# Patient Record
Sex: FEMALE | Race: WHITE | NOT HISPANIC OR LATINO | Employment: FULL TIME | URBAN - METROPOLITAN AREA
[De-identification: names, ages, dates, MRNs, and addresses within clinical notes are randomized per-mention and may not be internally consistent; named-entity substitution may affect disease eponyms.]

---

## 2017-03-24 ENCOUNTER — HOSPITAL ENCOUNTER (OUTPATIENT)
Dept: MAMMOGRAPHY | Facility: HOSPITAL | Age: 47
Discharge: HOME | End: 2017-03-24
Attending: FAMILY MEDICINE | Admitting: FAMILY MEDICINE

## 2017-11-09 ENCOUNTER — HOSPITAL ENCOUNTER (OUTPATIENT)
Dept: OTHER | Facility: HOSPITAL | Age: 47
Discharge: HOME | End: 2017-11-09
Attending: FAMILY MEDICINE | Admitting: FAMILY MEDICINE

## 2019-03-12 ENCOUNTER — HOSPITAL ENCOUNTER (OUTPATIENT)
Dept: OTHER | Facility: HOSPITAL | Age: 49
Discharge: HOME | End: 2019-03-12
Attending: FAMILY MEDICINE | Admitting: FAMILY MEDICINE

## 2019-09-10 ENCOUNTER — HOSPITAL ENCOUNTER (OUTPATIENT)
Dept: GENERAL RADIOLOGY | Facility: HOSPITAL | Age: 49
Discharge: HOME | End: 2019-09-10
Admitting: FAMILY MEDICINE

## 2019-09-10 ENCOUNTER — OFFICE VISIT (OUTPATIENT)
Dept: FAMILY MEDICINE CLINIC | Facility: CLINIC | Age: 49
End: 2019-09-10

## 2019-09-10 VITALS
BODY MASS INDEX: 34.88 KG/M2 | SYSTOLIC BLOOD PRESSURE: 118 MMHG | DIASTOLIC BLOOD PRESSURE: 78 MMHG | HEART RATE: 91 BPM | WEIGHT: 222.2 LBS | OXYGEN SATURATION: 97 % | RESPIRATION RATE: 18 BRPM | TEMPERATURE: 98.2 F | HEIGHT: 67 IN

## 2019-09-10 DIAGNOSIS — G89.29 CHRONIC LEFT HIP PAIN: Primary | ICD-10-CM

## 2019-09-10 DIAGNOSIS — M25.552 CHRONIC LEFT HIP PAIN: Primary | ICD-10-CM

## 2019-09-10 DIAGNOSIS — I10 HYPERTENSION, BENIGN: ICD-10-CM

## 2019-09-10 DIAGNOSIS — M79.7 FIBROMYALGIA: ICD-10-CM

## 2019-09-10 DIAGNOSIS — M25.50 ARTHRALGIA OF MULTIPLE JOINTS: ICD-10-CM

## 2019-09-10 DIAGNOSIS — M16.12 PRIMARY OSTEOARTHRITIS OF LEFT HIP: ICD-10-CM

## 2019-09-10 DIAGNOSIS — E66.3 OVERWEIGHT (BMI 25.0-29.9): ICD-10-CM

## 2019-09-10 PROBLEM — Z12.4 SCREENING FOR CERVICAL CANCER: Status: ACTIVE | Noted: 2019-09-10

## 2019-09-10 PROBLEM — J68.3 REACTIVE AIRWAYS DYSFUNCTION SYNDROME (HCC): Status: ACTIVE | Noted: 2019-09-10

## 2019-09-10 PROBLEM — M17.12 PRIMARY OSTEOARTHRITIS OF LEFT KNEE: Status: ACTIVE | Noted: 2019-09-10

## 2019-09-10 PROBLEM — M51.16 LUMBAR DISC DISEASE WITH RADICULOPATHY: Status: ACTIVE | Noted: 2019-09-10

## 2019-09-10 PROBLEM — F41.9 ANXIETY AND DEPRESSION: Status: ACTIVE | Noted: 2019-09-10

## 2019-09-10 PROBLEM — H25.9 AGE-RELATED CATARACT OF BOTH EYES: Status: ACTIVE | Noted: 2019-09-10

## 2019-09-10 PROBLEM — Z00.01 ENCOUNTER FOR ANNUAL GENERAL MEDICAL EXAMINATION WITH ABNORMAL FINDINGS IN ADULT: Status: ACTIVE | Noted: 2019-09-10

## 2019-09-10 PROBLEM — Z87.891 HISTORY OF TOBACCO USE: Status: ACTIVE | Noted: 2019-09-10

## 2019-09-10 PROBLEM — Z86.711 HISTORY OF PULMONARY EMBOLISM: Status: ACTIVE | Noted: 2019-09-10

## 2019-09-10 PROBLEM — Z12.31 ENCOUNTER FOR SCREENING MAMMOGRAM FOR MALIGNANT NEOPLASM OF BREAST: Status: ACTIVE | Noted: 2019-09-10

## 2019-09-10 PROBLEM — F32.A ANXIETY AND DEPRESSION: Status: ACTIVE | Noted: 2019-09-10

## 2019-09-10 PROBLEM — D68.51 HETEROZYGOUS FACTOR V LEIDEN MUTATION: Status: ACTIVE | Noted: 2019-09-10

## 2019-09-10 PROBLEM — K21.9 GASTROESOPHAGEAL REFLUX DISEASE WITHOUT ESOPHAGITIS: Status: ACTIVE | Noted: 2019-09-10

## 2019-09-10 PROBLEM — Z86.718 HISTORY OF DVT (DEEP VEIN THROMBOSIS): Status: ACTIVE | Noted: 2019-09-10

## 2019-09-10 PROCEDURE — 73502 X-RAY EXAM HIP UNI 2-3 VIEWS: CPT

## 2019-09-10 PROCEDURE — 99214 OFFICE O/P EST MOD 30 MIN: CPT | Performed by: FAMILY MEDICINE

## 2019-09-10 RX ORDER — ATENOLOL 25 MG/1
1 TABLET ORAL DAILY
COMMUNITY
Start: 2019-03-12 | End: 2020-03-09 | Stop reason: SDUPTHER

## 2019-09-10 RX ORDER — RABEPRAZOLE SODIUM 20 MG/1
1 TABLET, DELAYED RELEASE ORAL DAILY PRN
Refills: 4 | COMMUNITY
Start: 2019-08-18 | End: 2021-03-03 | Stop reason: SDUPTHER

## 2019-09-10 RX ORDER — MONTELUKAST SODIUM 10 MG/1
1 TABLET ORAL DAILY
COMMUNITY
Start: 2019-03-06 | End: 2020-03-25

## 2019-09-10 RX ORDER — LOSARTAN POTASSIUM 25 MG/1
1 TABLET ORAL DAILY
COMMUNITY
Start: 2019-03-12 | End: 2019-12-30

## 2019-09-10 RX ORDER — PSEUDOEPHEDRINE HCL 30 MG
1 TABLET ORAL EVERY 4 HOURS
COMMUNITY
Start: 2019-03-12

## 2019-09-10 RX ORDER — ALBUTEROL SULFATE 2.5 MG/3ML
2.5 SOLUTION RESPIRATORY (INHALATION)
COMMUNITY
Start: 2019-03-06 | End: 2019-09-10

## 2019-09-10 RX ORDER — LORATADINE 10 MG/1
1 TABLET ORAL DAILY
COMMUNITY
Start: 2019-03-06

## 2019-09-10 RX ORDER — DULOXETIN HYDROCHLORIDE 60 MG/1
1 CAPSULE, DELAYED RELEASE ORAL DAILY
COMMUNITY
Start: 2018-11-19 | End: 2020-03-25

## 2019-09-10 RX ORDER — BUDESONIDE 0.25 MG/2ML
0.25 INHALANT ORAL 2 TIMES DAILY
COMMUNITY
Start: 2019-03-12 | End: 2019-09-10

## 2019-09-10 RX ORDER — ACETAMINOPHEN 325 MG/1
1-2 TABLET ORAL EVERY 4 HOURS PRN
COMMUNITY
Start: 2017-11-08 | End: 2021-08-31

## 2019-09-10 NOTE — PROGRESS NOTES
Subjective   Giovanni Shin is a 49 y.o. female.     Giovanni was last by Dr. Sommers- hematology on 09/3/2019 for follow up on Factor V. She is suppose to follow up again in 6 months. She was referred to pulmonary and is scheduled on 09/19/2019 with Dr. Freeman for shortness of breath. She is on Lovenox chronically.      Hip Pain    The incident occurred more than 1 week ago. There was no injury mechanism. The pain is present in the left hip and right hip (groin region. Left > right). The quality of the pain is described as aching. The pain is moderate. The pain has been intermittent since onset. Pertinent negatives include no inability to bear weight, numbness or tingling. She reports no foreign bodies present. She has tried NSAIDs for the symptoms. The treatment provided no relief (She has known SI jt degenerative arthritis as well as LDD).   Arm Pain    The incident occurred more than 1 week ago. There was no injury mechanism. The pain is present in the left shoulder, left hand, left wrist, left elbow, right elbow, right hand, right shoulder and right wrist. The quality of the pain is described as aching. The pain is moderate. Pertinent negatives include no chest pain, numbness or tingling. She has tried NSAIDs for the symptoms. The treatment provided no relief.   Hypertension   This is a chronic problem. The current episode started more than 1 year ago. The problem is controlled. Pertinent negatives include no blurred vision, chest pain, headaches, neck pain, palpitations or peripheral edema. Risk factors for coronary artery disease include family history and obesity. Current antihypertension treatment includes beta blockers and angiotensin blockers. The current treatment provides significant improvement.   Back Pain   This is a chronic problem. The current episode started more than 1 year ago. The problem occurs constantly. The problem has been gradually worsening since onset. The pain radiates to the left thigh.  The pain is at a severity of 6/10. The pain is moderate. The pain is the same all the time. Pertinent negatives include no abdominal pain, chest pain, fever, headaches, numbness, paresthesias, pelvic pain, tingling, weakness or weight loss. (She has been seen by neuro-surgery in the past, her last MRI 2016, she has also been seen by ortho, both advised conservative Rx. She has mild to moderate spinal stenosis, and well as LDD at L4-L5. ) Risk factors: She has has hx of rheumatology 2017, with dx of fibromalgia, she reports that her pain is in all muscle groups. Shes does not tolerate lyrica or gabapentin. She has weaned from narcotics.         The following portions of the patient's history were reviewed and updated as appropriate: allergies, current medications, past family history, past medical history, past social history, past surgical history and problem list.    Family History   Problem Relation Age of Onset   • Hyperlipidemia Mother    • Hypertension Mother    • Hypertension Father    • Factor V Leiden deficiency Father    • Factor V Leiden deficiency Brother    • Cancer Maternal Grandfather          malignant neoplasm of gastrointestinal tract    • Down syndrome Maternal Cousin        Social History     Tobacco Use   • Smoking status: Former Smoker     Packs/day: 0.05     Types: Cigarettes     Start date: 1994   • Smokeless tobacco: Never Used   Substance Use Topics   • Alcohol use: Yes     Frequency: Monthly or less     Drinks per session: 1 or 2     Binge frequency: Never   • Drug use: No       History reviewed. No pertinent surgical history.    Patient Active Problem List   Diagnosis   • Age-related cataract of both eyes   • Allergic rhinitis due to pollen   • Anxiety and depression   • Arthralgia of multiple joints   • Encounter for annual general medical examination with abnormal findings in adult   • Encounter for screening mammogram for malignant neoplasm of breast   • Gastroesophageal reflux disease  without esophagitis   • Heterozygous factor V Leiden mutation (CMS/[facility])   • History of DVT (deep vein thrombosis)   • History of pulmonary embolism   • Hypertension, benign   • Lumbar disc disease with radiculopathy   • Overweight (BMI 25.0-29.9)   • Primary osteoarthritis of left knee   • Reactive airways dysfunction syndrome (CMS/[facility])   • Screening for cervical cancer   • History of tobacco use   • Fibromyalgia   • Primary osteoarthritis of left hip       Current Outpatient Medications on File Prior to Visit   Medication Sig Dispense Refill   • acetaminophen (TYLENOL) 325 MG tablet Take 1-2 tablets by mouth Every 4 (Four) Hours As Needed for Pain.     • atenolol (TENORMIN) 25 MG tablet Take 1 tablet by mouth Daily.     • DULoxetine (CYMBALTA) 60 MG capsule Take 1 capsule by mouth Daily.     • enoxaparin (LOVENOX) 150 MG/ML injection Inject 150 mg under the skin into the appropriate area as directed Daily.     • loratadine (CLARITIN) 10 MG tablet Take 1 tablet by mouth Daily.     • losartan (COZAAR) 25 MG tablet Take 1 tablet by mouth Daily.     • montelukast (SINGULAIR) 10 MG tablet Take 1 tablet by mouth Daily.     • pseudoephedrine (SUDAFED) 30 MG tablet Take 1 tablet by mouth Every 4 (Four) Hours.     • RABEprazole (ACIPHEX) 20 MG EC tablet Take 1 tablet by mouth Daily As Needed.  4     No current facility-administered medications on file prior to visit.        No Known Allergies    Review of Systems   Constitutional: Positive for fatigue. Negative for fever and unexpected weight loss.   HENT: Positive for congestion.    Eyes: Negative for blurred vision.   Cardiovascular: Negative for chest pain, palpitations and leg swelling.   Gastrointestinal: Negative for abdominal pain, nausea and vomiting.   Endocrine: Negative for cold intolerance, heat intolerance, polydipsia and polyuria.   Genitourinary: Negative for pelvic pain.   Musculoskeletal: Positive for arthralgias (multiple, multiple muscular complaints. )  "and back pain. Negative for gait problem, joint swelling and neck pain.   Skin: Negative for rash and bruise.   Allergic/Immunologic: Positive for environmental allergies.   Neurological: Negative for tingling, weakness, numbness, headache and paresthesias.   Psychiatric/Behavioral: Negative for depressed mood.       Objective   Visit Vitals  /78 (BP Location: Right arm, Patient Position: Sitting, Cuff Size: Adult)   Pulse 91   Temp 98.2 °F (36.8 °C) (Oral)   Resp 18   Ht 170.2 cm (67\")   Wt 101 kg (222 lb 3.2 oz)   LMP 09/03/2019 (Approximate)   SpO2 97% Comment: Room air   Breastfeeding? No   BMI 34.80 kg/m²     Physical Exam   Constitutional: She is oriented to person, place, and time. She appears well-developed and well-nourished. No distress.   Eyes: Conjunctivae are normal.   Neck: Neck supple. No JVD present. No thyromegaly present.   Cardiovascular: Normal rate, regular rhythm, normal heart sounds and intact distal pulses. Exam reveals no gallop and no friction rub.   No murmur heard.  Pulmonary/Chest: Effort normal and breath sounds normal. No respiratory distress. She has no wheezes. She has no rales.   Abdominal: Soft. Bowel sounds are normal. She exhibits no distension and no mass. There is no tenderness.   Musculoskeletal: She exhibits no edema.        Left shoulder: She exhibits decreased range of motion and tenderness. She exhibits no swelling, no deformity and normal pulse.        Left elbow: Normal.        Left wrist: Normal.        Lumbar back: She exhibits tenderness. She exhibits no bony tenderness, no swelling and no edema.   Lymphadenopathy:     She has no cervical adenopathy.   Neurological: She is alert and oriented to person, place, and time. She displays normal reflexes. Coordination normal.   Skin: Skin is warm. No rash noted. No erythema.   Psychiatric: She has a normal mood and affect.   Vitals reviewed.        Assessment/Plan .  Problem List Items Addressed This Visit        High "    Hypertension, benign    Overview     Controlled         Current Assessment & Plan     Hypertension is unchanged.  Continue current treatment regimen.  Blood pressure will be reassessed in 3 months.            Medium    Fibromyalgia    Overview     Diagnosed by rheumatology. Will begin PT continue prn OTC prn and follow.          Primary osteoarthritis of left hip    Overview     Mild on xray 09/10/2019            Low    Arthralgia of multiple joints    Overview     She has been seen by [facility], She reports the diagnosis of Fibromyalga.   She is to make a followup  Her workup with lab and xray were negative  She is off narotics         Relevant Orders    Ambulatory Referral to Pain Management    Overweight (BMI 25.0-29.9)    Overview     Diet exercise and wt loss encouraged. Prediabetes understood           Other Visit Diagnoses     Chronic left hip pain    -  Primary    mild arthritis on xray, but sxs likely aggravated by radicular  pain form LDD>    Relevant Orders    XR Hip With or Without Pelvis 2 - 3 View Left (Completed)

## 2019-09-17 ENCOUNTER — CLINICAL SUPPORT (OUTPATIENT)
Dept: FAMILY MEDICINE CLINIC | Facility: CLINIC | Age: 49
End: 2019-09-17

## 2019-09-17 ENCOUNTER — TELEPHONE (OUTPATIENT)
Dept: FAMILY MEDICINE CLINIC | Facility: CLINIC | Age: 49
End: 2019-09-17

## 2019-09-17 DIAGNOSIS — Z02.83 ENCOUNTER FOR DRUG SCREENING: Primary | ICD-10-CM

## 2019-09-17 DIAGNOSIS — Z11.1 VISIT FOR TB SKIN TEST: ICD-10-CM

## 2019-09-17 PROCEDURE — 86580 TB INTRADERMAL TEST: CPT | Performed by: FAMILY MEDICINE

## 2019-09-23 ENCOUNTER — TELEPHONE (OUTPATIENT)
Dept: FAMILY MEDICINE CLINIC | Facility: CLINIC | Age: 49
End: 2019-09-23

## 2019-11-08 ENCOUNTER — TRANSCRIBE ORDERS (OUTPATIENT)
Dept: ADMINISTRATIVE | Facility: HOSPITAL | Age: 49
End: 2019-11-08

## 2019-11-08 DIAGNOSIS — R06.00 DYSPNEA, UNSPECIFIED TYPE: Primary | ICD-10-CM

## 2019-11-11 RX ORDER — PANTOPRAZOLE SODIUM 40 MG/1
40 TABLET, DELAYED RELEASE ORAL DAILY
Qty: 30 TABLET | Refills: 12 | Status: SHIPPED | OUTPATIENT
Start: 2019-11-11 | End: 2019-12-27

## 2019-11-20 ENCOUNTER — RESULTS ENCOUNTER (OUTPATIENT)
Dept: PAIN MEDICINE | Facility: CLINIC | Age: 49
End: 2019-11-20

## 2019-11-20 ENCOUNTER — OFFICE VISIT (OUTPATIENT)
Dept: PAIN MEDICINE | Facility: CLINIC | Age: 49
End: 2019-11-20

## 2019-11-20 VITALS
HEART RATE: 73 BPM | TEMPERATURE: 99.3 F | WEIGHT: 217 LBS | RESPIRATION RATE: 16 BRPM | DIASTOLIC BLOOD PRESSURE: 86 MMHG | BODY MASS INDEX: 34.87 KG/M2 | OXYGEN SATURATION: 95 % | SYSTOLIC BLOOD PRESSURE: 128 MMHG | HEIGHT: 66 IN

## 2019-11-20 DIAGNOSIS — M54.42 CHRONIC BILATERAL LOW BACK PAIN WITH BILATERAL SCIATICA: ICD-10-CM

## 2019-11-20 DIAGNOSIS — Z79.899 LONG TERM USE OF DRUG: ICD-10-CM

## 2019-11-20 DIAGNOSIS — G89.29 CHRONIC BILATERAL LOW BACK PAIN WITH BILATERAL SCIATICA: ICD-10-CM

## 2019-11-20 DIAGNOSIS — M25.50 ARTHRALGIA OF MULTIPLE JOINTS: Primary | ICD-10-CM

## 2019-11-20 DIAGNOSIS — M51.16 LUMBAR DISC DISEASE WITH RADICULOPATHY: ICD-10-CM

## 2019-11-20 DIAGNOSIS — M17.12 PRIMARY OSTEOARTHRITIS OF LEFT KNEE: ICD-10-CM

## 2019-11-20 DIAGNOSIS — M16.12 PRIMARY OSTEOARTHRITIS OF LEFT HIP: ICD-10-CM

## 2019-11-20 DIAGNOSIS — M79.7 FIBROMYALGIA: ICD-10-CM

## 2019-11-20 DIAGNOSIS — M54.41 CHRONIC BILATERAL LOW BACK PAIN WITH BILATERAL SCIATICA: ICD-10-CM

## 2019-11-20 PROCEDURE — 99204 OFFICE O/P NEW MOD 45 MIN: CPT | Performed by: PHYSICAL MEDICINE & REHABILITATION

## 2019-11-20 PROCEDURE — G0463 HOSPITAL OUTPT CLINIC VISIT: HCPCS | Performed by: PHYSICAL MEDICINE & REHABILITATION

## 2019-11-20 RX ORDER — CELECOXIB 200 MG/1
200 CAPSULE ORAL DAILY PRN
Qty: 30 CAPSULE | Refills: 2 | Status: SHIPPED | OUTPATIENT
Start: 2019-11-20 | End: 2020-01-17 | Stop reason: SDUPTHER

## 2019-11-20 NOTE — PROGRESS NOTES
Subjective   Giovanni Shin is a 49 y.o. female.     Chronic LBP radiating to b/l hips, pain in b/l hips and multiple joints, 8/10 at worst, 5/10 at best, always present, varies, began around 2010, worsening, aching, pressure, worse with all activity and inactivity both, interfers with ADLs, sleep, activity, failed chiropractor, PT, LESIs, RFA, was taking Norco 7.5mg BID prn in past. X-ray L hip with mild OA. 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 No FH of substance abuse.         The following portions of the patient's history were reviewed and updated as appropriate: allergies, current medications, past family history, past medical history, past social history, past surgical history and problem list.    Review of Systems   Constitutional: Positive for fatigue. Negative for chills and fever.   HENT: Negative for hearing loss and trouble swallowing.    Eyes: Positive for visual disturbance.   Respiratory: Positive for shortness of breath.    Cardiovascular: Positive for chest pain.   Gastrointestinal: Negative for abdominal pain, constipation, diarrhea, nausea and vomiting.   Genitourinary: Negative for urinary incontinence.   Musculoskeletal: Positive for arthralgias, back pain and joint swelling. Negative for myalgias and neck pain.   Neurological: Positive for headache. Negative for dizziness, weakness and numbness.       Objective   Physical Exam   Constitutional: She is oriented to person, place, and time. She appears well-developed and well-nourished.   HENT:   Head: Normocephalic and atraumatic.   Eyes: EOM are normal. Pupils are equal, round, and reactive to light.   Neck: Normal range of motion.   Cardiovascular: Normal rate, regular rhythm, normal heart sounds and intact distal pulses.    Pulmonary/Chest: Breath sounds normal.   Abdominal: Soft. Bowel sounds are normal. She exhibits no distension. There is no tenderness.   Neurological: She is alert and oriented to person, place, and time. She has normal strength and normal reflexes. She displays normal reflexes. No sensory deficit.   Psychiatric: She has a normal mood and affect. Her behavior is normal. Thought content normal.         Assessment/Plan   Giovanni was seen today for back pain.    Diagnoses and all orders for this visit:    Arthralgia of multiple joints    Lumbar disc disease with radiculopathy    Fibromyalgia    Primary osteoarthritis of left knee    Primary osteoarthritis of left hip    Chronic bilateral low back pain with bilateral sciatica        Discussed risks and benefits of opioid treatment for chonic pain with patient, including expectations related to prescription requests, alternative modalities to opioids for managing pain, her treatment plan, risks of dependency and addiction, and safe storage practices for prescribed opioids, as well as proper and improper disposal of all medications.  Will obtain UDS today, pain contract today.  Inspect report reviewed, prior notes reviewed, consistent with patient's stated history.  Treatment plan will consist of continuing current medication as long as it remains effective and is necessary, while evaluating patient at each visit and determining if the medication can be lowered or discontinued, while also using nonopioid therapies to reduce reliance on opioids.  Begin Belbuca 150mcg/hr BID, may need to increase before next visit.  Cont Cymbalta. Cannot tolerate Gabapentin, Lyrica.  Begin Celebrex 200mg qdaily prn instead of Ibuprofen, less risk of GI bleed on Lovenox.  Failed LESIs, RFA.  RTC 2-3 months for f/u.      INSPECT REPORT     As part of the patient's treatment plan, I am prescribing controlled substances. The patient has been made aware of appropriate use of such medications,  including potential risk of somnolence, limited ability to drive and/or work safely, and the potential for dependence or overdose. It has also bee made clear that these medications are for use by this patient only, without concomitant use of alcohol or other substances unless prescribed.      Patient has completed prescribing agreement detailing terms of continued prescribing of controlled substances, including monitoring INSPECT reports, urine drug screening, and pill counts if necessary. The patient is aware that inappropriate use will results in cessation of prescribing such medications.     INSPECT report has been reviewed and scanned into the patient's chart.     As the clinician, I personally reviewed the INSPECT while the patient was in the office today.     History and physical exam exhibit continued safe and appropriate use of controlled substances.

## 2019-11-26 ENCOUNTER — TELEPHONE (OUTPATIENT)
Dept: PAIN MEDICINE | Facility: HOSPITAL | Age: 49
End: 2019-11-26

## 2019-11-26 NOTE — TELEPHONE ENCOUNTER
Patient was a new patient last week and was given belbuca. Insurance will not approve. The following criteria must be met,   You have already been taking an extended-release opioid drug for 30 days  - You have severe continuous pain and tried immediate-release opioids for one week. Please advise on if you would like to change med or try giving patient immediate release for one week.

## 2019-11-27 RX ORDER — HYDROCODONE BITARTRATE AND ACETAMINOPHEN 5; 325 MG/1; MG/1
1 TABLET ORAL EVERY 6 HOURS PRN
Qty: 28 TABLET | Refills: 0 | Status: SHIPPED | OUTPATIENT
Start: 2019-11-27 | End: 2020-01-17

## 2019-12-10 ENCOUNTER — TELEPHONE (OUTPATIENT)
Dept: PAIN MEDICINE | Facility: CLINIC | Age: 49
End: 2019-12-10

## 2019-12-10 NOTE — TELEPHONE ENCOUNTER
Patient called and states belbuca is making her drowsy. Patient states belbuca makes the pain go away but she cannot take due to it making her drowsy and having to drive to a lot. I informed patient to stop belbuca and you would be back Monday and we could discuss other treatment options.

## 2019-12-12 ENCOUNTER — HOSPITAL ENCOUNTER (OUTPATIENT)
Dept: RESPIRATORY THERAPY | Facility: HOSPITAL | Age: 49
Discharge: HOME | End: 2019-12-12
Admitting: INTERNAL MEDICINE

## 2019-12-12 DIAGNOSIS — R06.00 DYSPNEA, UNSPECIFIED TYPE: ICD-10-CM

## 2019-12-12 PROCEDURE — 94726 PLETHYSMOGRAPHY LUNG VOLUMES: CPT

## 2019-12-12 PROCEDURE — 94729 DIFFUSING CAPACITY: CPT

## 2019-12-12 PROCEDURE — 94060 EVALUATION OF WHEEZING: CPT

## 2019-12-12 RX ORDER — ALBUTEROL SULFATE 2.5 MG/3ML
2.5 SOLUTION RESPIRATORY (INHALATION) ONCE
Status: COMPLETED | OUTPATIENT
Start: 2019-12-12 | End: 2019-12-12

## 2019-12-12 RX ADMIN — ALBUTEROL SULFATE 2.5 MG: 2.5 SOLUTION RESPIRATORY (INHALATION) at 12:05

## 2019-12-20 ENCOUNTER — OFFICE VISIT (OUTPATIENT)
Dept: PAIN MEDICINE | Facility: CLINIC | Age: 49
End: 2019-12-20

## 2019-12-20 ENCOUNTER — TELEPHONE (OUTPATIENT)
Dept: PAIN MEDICINE | Facility: HOSPITAL | Age: 49
End: 2019-12-20

## 2019-12-20 VITALS
RESPIRATION RATE: 16 BRPM | HEART RATE: 67 BPM | OXYGEN SATURATION: 94 % | DIASTOLIC BLOOD PRESSURE: 73 MMHG | BODY MASS INDEX: 34.23 KG/M2 | HEIGHT: 66 IN | TEMPERATURE: 98.5 F | SYSTOLIC BLOOD PRESSURE: 114 MMHG | WEIGHT: 213 LBS

## 2019-12-20 DIAGNOSIS — G89.29 CHRONIC BILATERAL LOW BACK PAIN WITH BILATERAL SCIATICA: ICD-10-CM

## 2019-12-20 DIAGNOSIS — M17.12 PRIMARY OSTEOARTHRITIS OF LEFT KNEE: ICD-10-CM

## 2019-12-20 DIAGNOSIS — M25.50 ARTHRALGIA OF MULTIPLE JOINTS: Primary | ICD-10-CM

## 2019-12-20 DIAGNOSIS — M54.42 CHRONIC BILATERAL LOW BACK PAIN WITH BILATERAL SCIATICA: ICD-10-CM

## 2019-12-20 DIAGNOSIS — M51.16 LUMBAR DISC DISEASE WITH RADICULOPATHY: ICD-10-CM

## 2019-12-20 DIAGNOSIS — M54.41 CHRONIC BILATERAL LOW BACK PAIN WITH BILATERAL SCIATICA: ICD-10-CM

## 2019-12-20 DIAGNOSIS — M79.7 FIBROMYALGIA: ICD-10-CM

## 2019-12-20 DIAGNOSIS — M16.12 PRIMARY OSTEOARTHRITIS OF LEFT HIP: ICD-10-CM

## 2019-12-20 PROCEDURE — G0463 HOSPITAL OUTPT CLINIC VISIT: HCPCS | Performed by: PHYSICAL MEDICINE & REHABILITATION

## 2019-12-20 PROCEDURE — 99213 OFFICE O/P EST LOW 20 MIN: CPT | Performed by: PHYSICAL MEDICINE & REHABILITATION

## 2019-12-20 NOTE — PROGRESS NOTES
Subjective   Giovanni Shin is a 49 y.o. female.     Chronic LBP radiating to b/l hips, pain in b/l hips and multiple joints, 8/10 at worst, 5/10 at best, always present, varies, began around 2010, worsening, aching, pressure, worse with all activity and inactivity both, interfers with ADLs, sleep, activity, failed chiropractor, PT, LESIs, RFA, was taking Norco 7.5mg BID prn in past. X-ray L hip with mild OA. 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 No FH of substance abuse.       The following portions of the patient's history were reviewed and updated as appropriate: allergies, current medications, past family history, past medical history, past social history, past surgical history and problem list.    Review of Systems   Constitutional: Positive for fatigue. Negative for chills and fever.   HENT: Negative for hearing loss and trouble swallowing.    Eyes: Positive for visual disturbance.   Respiratory: Positive for shortness of breath.    Cardiovascular: Positive for chest pain.   Gastrointestinal: Negative for abdominal pain, constipation, diarrhea, nausea and vomiting.   Genitourinary: Negative for urinary incontinence.   Musculoskeletal: Positive for arthralgias, back pain and joint swelling. Negative for myalgias and neck pain.   Neurological: Positive for headache. Negative for dizziness, weakness and numbness.       Objective   Physical Exam   Constitutional: She is oriented to person, place, and time. She appears well-developed and well-nourished.   HENT:   Head: Normocephalic and atraumatic.   Eyes: Pupils are equal, round, and reactive to light. EOM are normal.   Neck: Normal range of motion.   Cardiovascular: Normal rate, regular rhythm, normal heart sounds and intact distal pulses.    Pulmonary/Chest: Breath sounds normal.   Abdominal: Soft. Bowel sounds are normal. She exhibits no distension. There is no tenderness.   Neurological: She is alert and oriented to person, place, and time. She has normal strength and normal reflexes. She displays normal reflexes. No sensory deficit.   Psychiatric: She has a normal mood and affect. Her behavior is normal. Thought content normal.         Assessment/Plan   Giovanni was seen today for back pain.    Diagnoses and all orders for this visit:    Arthralgia of multiple joints    Chronic bilateral low back pain with bilateral sciatica    Fibromyalgia    Lumbar disc disease with radiculopathy    Primary osteoarthritis of left hip    Primary osteoarthritis of left knee        UDS in order 11/20/19.  Treatment plan will consist of continuing current medication as long as it remains effective and is necessary, while evaluating patient at each visit and determining if the medication can be lowered or discontinued, while also using nonopioid therapies to reduce reliance on opioids.  Began Belbuca 150mcg/hr BID, helped but made her drowsy. Begin 75mcg/hr BID.  Did OK on Norco 5mg but needs time release meds.  Cont Cymbalta. Cannot tolerate Gabapentin, Lyrica.  Began Celebrex 200mg qdaily prn instead of Ibuprofen, less risk of GI bleed on Lovenox.  Failed LESIs, RFA.  RTC 3 months for f/u.      INSPECT REPORT     As part of the patient's treatment plan, I am prescribing controlled substances. The patient has been made aware of appropriate use of such medications, including potential risk of somnolence, limited ability to drive and/or work safely, and the potential for dependence or overdose. It has also bee made clear that these medications are for use by this patient only, without concomitant use of alcohol or other substances unless prescribed.      Patient has completed prescribing agreement detailing terms of continued prescribing of controlled substances,  including monitoring INSPECT reports, urine drug screening, and pill counts if necessary. The patient is aware that inappropriate use will results in cessation of prescribing such medications.     INSPECT report has been reviewed and scanned into the patient's chart.     As the clinician, I personally reviewed the INSPECT while the patient was in the office today.     History and physical exam exhibit continued safe and appropriate use of controlled substances.

## 2019-12-20 NOTE — TELEPHONE ENCOUNTER
Patient returned 7 [facility] films counted and destroyed by Yoko Fernandez MA witnessed Wanda Garcia RN.

## 2019-12-27 ENCOUNTER — OFFICE VISIT (OUTPATIENT)
Dept: FAMILY MEDICINE CLINIC | Facility: CLINIC | Age: 49
End: 2019-12-27

## 2019-12-27 VITALS
BODY MASS INDEX: 35.55 KG/M2 | TEMPERATURE: 97.6 F | HEART RATE: 71 BPM | HEIGHT: 66 IN | DIASTOLIC BLOOD PRESSURE: 76 MMHG | SYSTOLIC BLOOD PRESSURE: 111 MMHG | OXYGEN SATURATION: 97 % | RESPIRATION RATE: 16 BRPM | WEIGHT: 221.2 LBS

## 2019-12-27 DIAGNOSIS — L72.0 INCLUSION CYST: Primary | ICD-10-CM

## 2019-12-27 DIAGNOSIS — E66.3 OVERWEIGHT (BMI 25.0-29.9): ICD-10-CM

## 2019-12-27 DIAGNOSIS — Z87.891 HISTORY OF TOBACCO USE: ICD-10-CM

## 2019-12-27 DIAGNOSIS — I10 HYPERTENSION, BENIGN: ICD-10-CM

## 2019-12-27 PROCEDURE — 11310 SHAVE SKIN LESION 0.5 CM/<: CPT | Performed by: FAMILY MEDICINE

## 2019-12-27 NOTE — PROGRESS NOTES
Subjective   Giovanni Shin is a 49 y.o. female.     Chief Complaint   Patient presents with   • Lesion       Lesion:   Giovanni Shin is here today for a lesion. The lesion appeared gradual and has been occurring for years. The course has been increasing in size. The lesion is characterized as no sign of infection. The lesions are located over face. The symptoms have been associated with moderate to severe itching. The lesions are flesh colored.        Hypertension   This is a chronic problem. The current episode started more than 1 year ago. The problem is controlled. Pertinent negatives include no blurred vision, chest pain, headaches, palpitations or shortness of breath. Current antihypertension treatment includes beta blockers and angiotensin blockers. The current treatment provides significant improvement.        The following portions of the patient's history were reviewed and updated as appropriate: allergies, current medications, past family history, past medical history, past social history, past surgical history and problem list.    Allergies:  No Known Allergies    Social History:  Social History     Socioeconomic History   • Marital status:      Spouse name: Not on file   • Number of children: Not on file   • Years of education: Not on file   • Highest education level: Not on file   Tobacco Use   • Smoking status: Former Smoker     Packs/day: 0.05     Types: Cigarettes     Start date: 1994   • Smokeless tobacco: Never Used   Substance and Sexual Activity   • Alcohol use: Yes     Frequency: Monthly or less     Drinks per session: 1 or 2     Binge frequency: Never   • Drug use: No   • Sexual activity: Defer       Family History:  Family History   Problem Relation Age of Onset   • Hyperlipidemia Mother    • Hypertension Mother    • Hypertension Father    • Factor V Leiden deficiency Father    • Factor V Leiden deficiency Brother    • Cancer Maternal Grandfather          malignant neoplasm of  gastrointestinal tract    • Down syndrome Maternal Cousin        Past Medical History :  Patient Active Problem List   Diagnosis   • Age-related cataract of both eyes   • Allergic rhinitis due to pollen   • Anxiety and depression   • Arthralgia of multiple joints   • Encounter for annual general medical examination with abnormal findings in adult   • Encounter for screening mammogram for malignant neoplasm of breast   • Gastroesophageal reflux disease without esophagitis   • Heterozygous factor V Leiden mutation (CMS/HCC)   • History of DVT (deep vein thrombosis)   • History of pulmonary embolism   • Hypertension, benign   • Lumbar disc disease with radiculopathy   • Overweight (BMI 25.0-29.9)   • Primary osteoarthritis of left knee   • Reactive airways dysfunction syndrome (CMS/HCC)   • Screening for cervical cancer   • History of tobacco use   • Fibromyalgia   • Primary osteoarthritis of left hip   • Chronic bilateral low back pain with bilateral sciatica       Medication List:    Current Outpatient Medications:   •  acetaminophen (TYLENOL) 325 MG tablet, Take 1-2 tablets by mouth Every 4 (Four) Hours As Needed for Pain., Disp: , Rfl:   •  atenolol (TENORMIN) 25 MG tablet, Take 1 tablet by mouth Daily., Disp: , Rfl:   •  Buprenorphine HCl (BELBUCA) 75 MCG film, Apply 1 film to cheek Every 12 (Twelve) Hours., Disp: 60 each, Rfl: 0  •  celecoxib (CeleBREX) 200 MG capsule, Take 1 capsule by mouth Daily As Needed for Mild Pain ., Disp: 30 capsule, Rfl: 2  •  DULoxetine (CYMBALTA) 60 MG capsule, Take 1 capsule by mouth Daily., Disp: , Rfl:   •  enoxaparin (LOVENOX) 150 MG/ML injection, Inject 150 mg under the skin into the appropriate area as directed Daily., Disp: , Rfl:   •  HYDROcodone-acetaminophen (NORCO) 5-325 MG per tablet, Take 1 tablet by mouth Every 6 (Six) Hours As Needed for Moderate Pain ., Disp: 28 tablet, Rfl: 0  •  loratadine (CLARITIN) 10 MG tablet, Take 1 tablet by mouth Daily., Disp: , Rfl:   •   "montelukast (SINGULAIR) 10 MG tablet, Take 1 tablet by mouth Daily., Disp: , Rfl:   •  pseudoephedrine (SUDAFED) 30 MG tablet, Take 1 tablet by mouth Every 4 (Four) Hours., Disp: , Rfl:   •  RABEprazole (ACIPHEX) 20 MG EC tablet, Take 1 tablet by mouth Daily As Needed., Disp: , Rfl: 4  •  VITAMIN D PO, Take 50 Units by mouth Daily., Disp: , Rfl:   •  losartan (COZAAR) 25 MG tablet, Take 1 tablet by mouth Daily., Disp: 90 tablet, Rfl: 3    Past Surgical History:  History reviewed. No pertinent surgical history.    Review of Systems:  Review of Systems   Eyes: Negative for blurred vision.   Respiratory: Negative for shortness of breath.    Cardiovascular: Negative for chest pain and palpitations.       Physical Exam:  Vital Signs:  Visit Vitals  /76 (BP Location: Right arm, Patient Position: Sitting, Cuff Size: Adult)   Pulse 71   Temp 97.6 °F (36.4 °C)   Resp 16   Ht 167.6 cm (66\")   Wt 100 kg (221 lb 3.2 oz)   LMP 12/15/2019   SpO2 97% Comment: room air   BMI 35.70 kg/m²       Physical Exam   Constitutional: She appears well-developed and well-nourished. No distress.   Cardiovascular: Normal rate, regular rhythm and normal heart sounds. Exam reveals no gallop and no friction rub.   No murmur heard.  Pulmonary/Chest: Effort normal and breath sounds normal.   Skin:   There are 3 flesh colored lesions with center craters 2 over the right cheek and 1 over the left chin. The lesions appear consistent with inclusion cysts.      Biopsy  Date/Time: 12/27/2019 9:21 AM  Performed by: Kath Eisenberg MD  Authorized by: Kath Eisenberg MD     Procedure Details - Skin Biopsy:     Initial size (mm): 3    Malignancy: benign lesion      Destruction method: shave biopsy      Comments:   Lesion #1, 5 mm right superior cheek, cleansed with betadine, anesthestized   with 2 % lidocaine and shave, bleeding controlled with cautery.   Lesion # 2, 5 mm right inferior cheek, cleansed with betadine, anesthestized   with 2 % " lidocaine and shave, bleeding controlled with cautery  Lesion # 3, 5 mm left chin cleansed with betadine, anesthestized   with 2 % lidocaine and shave, bleeding controlled with cautery    Bleeding was minimal she left the off ice in stable condition and tolerated the procedure well.       Assessment and Plan:  Problem List Items Addressed This Visit        High    Hypertension, benign    Overview     Controlled            Low    Overweight (BMI 25.0-29.9)    Overview     Diet exercise and wt loss encouraged. Prediabetes understood         History of tobacco use      Other Visit Diagnoses     Inclusion cyst    -  Primary    Relevant Orders    Biopsy    Reference Histopathology          An After Visit Summary and PPPS were given to the patient.

## 2019-12-30 RX ORDER — LOSARTAN POTASSIUM 25 MG/1
25 TABLET ORAL DAILY
Qty: 90 TABLET | Refills: 3 | Status: SHIPPED | OUTPATIENT
Start: 2019-12-30 | End: 2020-03-09 | Stop reason: SDUPTHER

## 2020-01-07 LAB
DX ICD CODE: NORMAL
PATH REPORT.FINAL DX SPEC: NORMAL
PATH REPORT.GROSS SPEC: NORMAL
PATH REPORT.SITE OF ORIGIN SPEC: NORMAL
PATHOLOGIST NAME: NORMAL
PAYMENT PROCEDURE: NORMAL

## 2020-01-17 ENCOUNTER — TELEPHONE (OUTPATIENT)
Dept: PAIN MEDICINE | Facility: CLINIC | Age: 50
End: 2020-01-17

## 2020-01-17 ENCOUNTER — OFFICE VISIT (OUTPATIENT)
Dept: PAIN MEDICINE | Facility: CLINIC | Age: 50
End: 2020-01-17

## 2020-01-17 VITALS
SYSTOLIC BLOOD PRESSURE: 130 MMHG | TEMPERATURE: 98.6 F | HEART RATE: 81 BPM | DIASTOLIC BLOOD PRESSURE: 85 MMHG | RESPIRATION RATE: 16 BRPM | OXYGEN SATURATION: 98 %

## 2020-01-17 DIAGNOSIS — M54.41 CHRONIC BILATERAL LOW BACK PAIN WITH BILATERAL SCIATICA: ICD-10-CM

## 2020-01-17 DIAGNOSIS — G89.29 CHRONIC BILATERAL LOW BACK PAIN WITH BILATERAL SCIATICA: ICD-10-CM

## 2020-01-17 DIAGNOSIS — M16.12 PRIMARY OSTEOARTHRITIS OF LEFT HIP: ICD-10-CM

## 2020-01-17 DIAGNOSIS — M54.42 CHRONIC BILATERAL LOW BACK PAIN WITH BILATERAL SCIATICA: ICD-10-CM

## 2020-01-17 DIAGNOSIS — M51.16 LUMBAR DISC DISEASE WITH RADICULOPATHY: ICD-10-CM

## 2020-01-17 DIAGNOSIS — M79.7 FIBROMYALGIA: ICD-10-CM

## 2020-01-17 DIAGNOSIS — M17.12 PRIMARY OSTEOARTHRITIS OF LEFT KNEE: ICD-10-CM

## 2020-01-17 DIAGNOSIS — M25.50 ARTHRALGIA OF MULTIPLE JOINTS: Primary | ICD-10-CM

## 2020-01-17 PROCEDURE — 99214 OFFICE O/P EST MOD 30 MIN: CPT | Performed by: PHYSICAL MEDICINE & REHABILITATION

## 2020-01-17 PROCEDURE — G0463 HOSPITAL OUTPT CLINIC VISIT: HCPCS | Performed by: PHYSICAL MEDICINE & REHABILITATION

## 2020-01-17 RX ORDER — HYDROCODONE BITARTRATE 20 MG/1
1 TABLET, EXTENDED RELEASE ORAL DAILY
Qty: 30 EACH | Refills: 0 | Status: SHIPPED | OUTPATIENT
Start: 2020-01-17 | End: 2020-03-16

## 2020-01-17 RX ORDER — HYDROCODONE BITARTRATE 20 MG/1
1 TABLET, EXTENDED RELEASE ORAL DAILY
Qty: 30 EACH | Refills: 0 | Status: SHIPPED | OUTPATIENT
Start: 2020-01-17 | End: 2020-01-17 | Stop reason: SDUPTHER

## 2020-01-17 RX ORDER — CELECOXIB 200 MG/1
200 CAPSULE ORAL DAILY PRN
Qty: 30 CAPSULE | Refills: 11 | Status: SHIPPED | OUTPATIENT
Start: 2020-01-17 | End: 2020-05-20 | Stop reason: SDUPTHER

## 2020-01-17 NOTE — PROGRESS NOTES
Subjective   Giovanni Shin is a 49 y.o. female.     Chronic LBP radiating to b/l hips, pain in b/l hips and multiple joints, 8/10 at worst, 5/10 at best, always present, varies, began around 2010, worsening, aching, pressure, worse with all activity and inactivity both, interfers with ADLs, sleep, activity, failed chiropractor, PT, LESIs, RFA, was taking Norco 7.5mg BID prn in past. X-ray L hip with mild OA. 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 No FH of substance abuse.       The following portions of the patient's history were reviewed and updated as appropriate: allergies, current medications, past family history, past medical history, past social history, past surgical history and problem list.    Review of Systems   Constitutional: Positive for fatigue. Negative for chills and fever.   HENT: Negative for hearing loss and trouble swallowing.    Eyes: Positive for visual disturbance.   Respiratory: Positive for shortness of breath.    Cardiovascular: Positive for chest pain.   Gastrointestinal: Negative for abdominal pain, constipation, diarrhea, nausea and vomiting.   Genitourinary: Negative for urinary incontinence.   Musculoskeletal: Positive for arthralgias, back pain and joint swelling. Negative for myalgias and neck pain.   Neurological: Positive for headache. Negative for dizziness, weakness and numbness.       Objective   Physical Exam   Constitutional: She is oriented to person, place, and time. She appears well-developed and well-nourished.   HENT:   Head: Normocephalic and atraumatic.   Eyes: Pupils are equal, round, and reactive to light. EOM are normal.   Neck: Normal range of motion.   Cardiovascular: Normal rate, regular rhythm, normal heart sounds and intact distal pulses.    Pulmonary/Chest: Breath sounds normal.   Abdominal: Soft. Bowel sounds are normal. She exhibits no distension. There is no tenderness.   Neurological: She is alert and oriented to person, place, and time. She has normal strength and normal reflexes. She displays normal reflexes. No sensory deficit.   Psychiatric: She has a normal mood and affect. Her behavior is normal. Thought content normal.         Assessment/Plan   Giovanni was seen today for back pain and joint pain.    Diagnoses and all orders for this visit:    Arthralgia of multiple joints    Chronic bilateral low back pain with bilateral sciatica    Fibromyalgia    Lumbar disc disease with radiculopathy    Primary osteoarthritis of left hip    Primary osteoarthritis of left knee        UDS in order 11/20/19.  Treatment plan will consist of continuing current medication as long as it remains effective and is necessary, while evaluating patient at each visit and determining if the medication can be lowered or discontinued, while also using nonopioid therapies to reduce reliance on opioids.  Began Belbuca 150mcg/hr BID, helped but made her drowsy, also with 75mcg/hr BID.  Did OK on Norco 5mg but needs time release meds. Begin Hysingla 20mg qdaily.  Cont Cymbalta. Cannot tolerate Gabapentin, Lyrica.  Began Celebrex 200mg qdaily prn instead of Ibuprofen, less risk of GI bleed on Lovenox.  Failed LESJuan, REAL.  RTC 3 months for f/u.      INSPECT REPORT     As part of the patient's treatment plan, I am prescribing controlled substances. The patient has been made aware of appropriate use of such medications, including potential risk of somnolence, limited ability to drive and/or work safely, and the potential for dependence or overdose. It has also bee made clear that these medications are for use by this patient only, without concomitant use of alcohol or other substances unless prescribed.      Patient has completed prescribing agreement detailing terms of  continued prescribing of controlled substances, including monitoring INSPECT reports, urine drug screening, and pill counts if necessary. The patient is aware that inappropriate use will results in cessation of prescribing such medications.     INSPECT report has been reviewed and scanned into the patient's chart.     As the clinician, I personally reviewed the INSPECT while the patient was in the office today.     History and physical exam exhibit continued safe and appropriate use of controlled substances.

## 2020-01-17 NOTE — TELEPHONE ENCOUNTER
Pt brought in #[facility] patches to waste. Patches destroyed and disposed of in the sharps container witnessed by robin villalpando ma

## 2020-02-24 ENCOUNTER — TELEPHONE (OUTPATIENT)
Dept: PAIN MEDICINE | Facility: HOSPITAL | Age: 50
End: 2020-02-24

## 2020-03-09 ENCOUNTER — TELEPHONE (OUTPATIENT)
Dept: FAMILY MEDICINE CLINIC | Facility: CLINIC | Age: 50
End: 2020-03-09

## 2020-03-09 DIAGNOSIS — I10 HYPERTENSION, BENIGN: Primary | ICD-10-CM

## 2020-03-09 RX ORDER — ATENOLOL 25 MG/1
25 TABLET ORAL DAILY
Qty: 90 TABLET | Refills: 3 | Status: SHIPPED | OUTPATIENT
Start: 2020-03-09 | End: 2021-02-08 | Stop reason: SDUPTHER

## 2020-03-09 RX ORDER — LOSARTAN POTASSIUM 25 MG/1
25 TABLET ORAL DAILY
Qty: 90 TABLET | Refills: 3 | Status: SHIPPED | OUTPATIENT
Start: 2020-03-09 | End: 2021-02-08 | Stop reason: SDUPTHER

## 2020-03-16 ENCOUNTER — OFFICE VISIT (OUTPATIENT)
Dept: PAIN MEDICINE | Facility: CLINIC | Age: 50
End: 2020-03-16

## 2020-03-16 ENCOUNTER — TELEPHONE (OUTPATIENT)
Dept: PAIN MEDICINE | Facility: CLINIC | Age: 50
End: 2020-03-16

## 2020-03-16 VITALS
BODY MASS INDEX: 31.66 KG/M2 | HEART RATE: 64 BPM | OXYGEN SATURATION: 95 % | TEMPERATURE: 98.1 F | HEIGHT: 66 IN | DIASTOLIC BLOOD PRESSURE: 59 MMHG | SYSTOLIC BLOOD PRESSURE: 118 MMHG | WEIGHT: 197 LBS | RESPIRATION RATE: 16 BRPM

## 2020-03-16 DIAGNOSIS — M79.7 FIBROMYALGIA: ICD-10-CM

## 2020-03-16 DIAGNOSIS — M54.42 CHRONIC BILATERAL LOW BACK PAIN WITH BILATERAL SCIATICA: Primary | ICD-10-CM

## 2020-03-16 DIAGNOSIS — G89.29 CHRONIC BILATERAL LOW BACK PAIN WITH BILATERAL SCIATICA: Primary | ICD-10-CM

## 2020-03-16 DIAGNOSIS — M16.12 PRIMARY OSTEOARTHRITIS OF LEFT HIP: ICD-10-CM

## 2020-03-16 DIAGNOSIS — M17.12 PRIMARY OSTEOARTHRITIS OF LEFT KNEE: ICD-10-CM

## 2020-03-16 DIAGNOSIS — M51.16 LUMBAR DISC DISEASE WITH RADICULOPATHY: ICD-10-CM

## 2020-03-16 DIAGNOSIS — M54.41 CHRONIC BILATERAL LOW BACK PAIN WITH BILATERAL SCIATICA: Primary | ICD-10-CM

## 2020-03-16 PROCEDURE — 99214 OFFICE O/P EST MOD 30 MIN: CPT | Performed by: PHYSICAL MEDICINE & REHABILITATION

## 2020-03-16 PROCEDURE — G0463 HOSPITAL OUTPT CLINIC VISIT: HCPCS | Performed by: PHYSICAL MEDICINE & REHABILITATION

## 2020-03-16 RX ORDER — RIVAROXABAN 20 MG/1
20 TABLET, FILM COATED ORAL DAILY
COMMUNITY
Start: 2020-03-03 | End: 2021-12-15 | Stop reason: ALTCHOICE

## 2020-03-16 RX ORDER — HYDROCODONE BITARTRATE AND ACETAMINOPHEN 5; 325 MG/1; MG/1
1 TABLET ORAL EVERY 6 HOURS PRN
Qty: 120 TABLET | Refills: 0 | Status: SHIPPED | OUTPATIENT
Start: 2020-03-16 | End: 2020-06-10 | Stop reason: SDUPTHER

## 2020-03-16 NOTE — PROGRESS NOTES
Subjective   Giovanni Shin is a 50 y.o. female.     Chronic LBP radiating to b/l hips, pain in b/l hips and multiple joints, 8/10 at worst, 5/10 at best, always present, varies, began around 2010, worsening, aching, pressure, worse with all activity and inactivity both, interfers with ADLs, sleep, activity, failed chiropractor, PT, LESIs, RFA, was taking Norco 7.5mg BID prn in past. X-ray L hip with mild OA. 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 No FH of substance abuse.       The following portions of the patient's history were reviewed and updated as appropriate: allergies, current medications, past family history, past medical history, past social history, past surgical history and problem list.    Review of Systems   Constitutional: Positive for fatigue. Negative for chills and fever.   HENT: Negative for hearing loss and trouble swallowing.    Eyes: Positive for visual disturbance.   Respiratory: Positive for shortness of breath.    Cardiovascular: Positive for chest pain.   Gastrointestinal: Negative for abdominal pain, constipation, diarrhea, nausea and vomiting.   Genitourinary: Negative for urinary incontinence.   Musculoskeletal: Positive for arthralgias, back pain and joint swelling. Negative for myalgias and neck pain.   Neurological: Positive for headache. Negative for dizziness, weakness and numbness.       Objective   Physical Exam   Constitutional: She is oriented to person, place, and time. She appears well-developed and well-nourished.   HENT:   Head: Normocephalic and atraumatic.   Eyes: Pupils are equal, round, and reactive to light. EOM are normal.   Neck: Normal range of motion.   Cardiovascular: Normal rate, regular rhythm, normal heart sounds and intact distal pulses.    Pulmonary/Chest: Breath sounds normal.   Abdominal: Soft. Bowel sounds are normal. She exhibits no distension. There is no tenderness.   Neurological: She is alert and oriented to person, place, and time. She has normal strength and normal reflexes. She displays normal reflexes. No sensory deficit.   Psychiatric: She has a normal mood and affect. Her behavior is normal. Thought content normal.         Assessment/Plan   Giovanni was seen today for hip pain, knee pain and back pain.    Diagnoses and all orders for this visit:    Chronic bilateral low back pain with bilateral sciatica    Fibromyalgia    Lumbar disc disease with radiculopathy    Primary osteoarthritis of left hip    Primary osteoarthritis of left knee        UDS in order 11/20/19.  Treatment plan will consist of continuing current medication as long as it remains effective and is necessary, while evaluating patient at each visit and determining if the medication can be lowered or discontinued, while also using nonopioid therapies to reduce reliance on opioids.  Began Belbuca 150mcg/hr BID, helped but made her drowsy, also with 75mcg/hr BID.  Did OK on Norco 5mg but needs time release meds with making her too hyper. Began Hysingla 20mg qdaily, still too drowsy.  Restart Norco prn. Begin Pennsaid BID prn.  Cont Cymbalta. Cannot tolerate Gabapentin, Lyrica.  Began Celebrex 200mg qdaily prn instead of Ibuprofen, less risk of GI bleed on Lovenox.  Failed LESIs, RFA.  RTC 3 months for f/u.      INSPECT REPORT     As part of the patient's treatment plan, I am prescribing controlled substances. The patient has been made aware of appropriate use of such medications, including potential risk of somnolence, limited ability to drive and/or work safely, and the potential for dependence or overdose. It has also bee made clear that these medications are for use by this patient only, without concomitant use of alcohol or other substances unless prescribed.      Patient  has completed prescribing agreement detailing terms of continued prescribing of controlled substances, including monitoring INSPECT reports, urine drug screening, and pill counts if necessary. The patient is aware that inappropriate use will results in cessation of prescribing such medications.     INSPECT report has been reviewed and scanned into the patient's chart.     As the clinician, I personally reviewed the INSPECT while the patient was in the office today.     History and physical exam exhibit continued safe and appropriate use of controlled substances.

## 2020-03-16 NOTE — TELEPHONE ENCOUNTER
[facility] RETURNED WITH [facility] FOR 2/16/20. DESTROYED BY JIMENA RIVERA MA WITNESSED BY ALVINA MOSQUERA RN.

## 2020-03-25 RX ORDER — DULOXETIN HYDROCHLORIDE 60 MG/1
CAPSULE, DELAYED RELEASE ORAL
Qty: 90 CAPSULE | Refills: 3 | Status: SHIPPED | OUTPATIENT
Start: 2020-03-25 | End: 2021-02-08 | Stop reason: SDUPTHER

## 2020-03-25 RX ORDER — MONTELUKAST SODIUM 10 MG/1
TABLET ORAL
Qty: 90 TABLET | Refills: 3 | Status: SHIPPED | OUTPATIENT
Start: 2020-03-25 | End: 2021-07-13 | Stop reason: SDUPTHER

## 2020-05-19 ENCOUNTER — HOSPITAL ENCOUNTER (OUTPATIENT)
Dept: ULTRASOUND IMAGING | Facility: HOSPITAL | Age: 50
Discharge: HOME | End: 2020-05-19
Admitting: FAMILY MEDICINE

## 2020-05-19 ENCOUNTER — OFFICE VISIT (OUTPATIENT)
Dept: FAMILY MEDICINE CLINIC | Facility: CLINIC | Age: 50
End: 2020-05-19

## 2020-05-19 VITALS
RESPIRATION RATE: 16 BRPM | OXYGEN SATURATION: 96 % | TEMPERATURE: 98.6 F | WEIGHT: 204.4 LBS | SYSTOLIC BLOOD PRESSURE: 124 MMHG | DIASTOLIC BLOOD PRESSURE: 80 MMHG | HEIGHT: 66 IN | BODY MASS INDEX: 32.85 KG/M2 | HEART RATE: 81 BPM

## 2020-05-19 DIAGNOSIS — N92.4 ABNORMAL PERIMENOPAUSAL BLEEDING: Primary | ICD-10-CM

## 2020-05-19 DIAGNOSIS — Z13.220 SCREENING FOR HYPERLIPIDEMIA: ICD-10-CM

## 2020-05-19 DIAGNOSIS — E66.3 OVERWEIGHT (BMI 25.0-29.9): ICD-10-CM

## 2020-05-19 DIAGNOSIS — Z87.891 HISTORY OF TOBACCO USE: ICD-10-CM

## 2020-05-19 DIAGNOSIS — Z12.4 CERVICAL CANCER SCREENING: ICD-10-CM

## 2020-05-19 DIAGNOSIS — N92.4 ABNORMAL PERIMENOPAUSAL BLEEDING: ICD-10-CM

## 2020-05-19 DIAGNOSIS — I10 HYPERTENSION, BENIGN: ICD-10-CM

## 2020-05-19 DIAGNOSIS — D68.51 HETEROZYGOUS FACTOR V LEIDEN MUTATION (HCC): ICD-10-CM

## 2020-05-19 LAB
B-HCG UR QL: NEGATIVE
BILIRUB BLD-MCNC: NEGATIVE MG/DL
CLARITY, POC: ABNORMAL
COLOR UR: ABNORMAL
GLUCOSE UR STRIP-MCNC: NEGATIVE MG/DL
INTERNAL NEGATIVE CONTROL: NEGATIVE
INTERNAL POSITIVE CONTROL: POSITIVE
KETONES UR QL: NEGATIVE
LEUKOCYTE EST, POC: NEGATIVE
Lab: NORMAL
NITRITE UR-MCNC: NEGATIVE MG/ML
PH UR: 6 [PH] (ref 5–8)
PROT UR STRIP-MCNC: ABNORMAL MG/DL
RBC # UR STRIP: ABNORMAL /UL
SP GR UR: 1.02 (ref 1–1.03)
UROBILINOGEN UR QL: NORMAL

## 2020-05-19 PROCEDURE — 76830 TRANSVAGINAL US NON-OB: CPT

## 2020-05-19 PROCEDURE — 81002 URINALYSIS NONAUTO W/O SCOPE: CPT | Performed by: FAMILY MEDICINE

## 2020-05-19 PROCEDURE — 81025 URINE PREGNANCY TEST: CPT | Performed by: FAMILY MEDICINE

## 2020-05-19 PROCEDURE — 58100 BIOPSY OF UTERUS LINING: CPT | Performed by: FAMILY MEDICINE

## 2020-05-19 PROCEDURE — 99214 OFFICE O/P EST MOD 30 MIN: CPT | Performed by: FAMILY MEDICINE

## 2020-05-19 PROCEDURE — 76856 US EXAM PELVIC COMPLETE: CPT

## 2020-05-19 NOTE — PROGRESS NOTES
Subjective   Giovanni Shin is a 50 y.o. female.     Chief Complaint   Patient presents with   • Menstrual Problem       Menstrual Problem:  Giovanni is G5, P4. The menstrual problem is characterized as intermenstrual bleeding and heavy menses and has been occurring for 6 weeks. Giovanni states that she has not had a period at 11/2019. Last menstrual period: Date: (04/04/2020, 04/26/2020, 05/16/2020). Currently pregnant: no The symptoms have been associated with abdominal pain, anxiety, abdominal cramps, hot flashes and low back pain, while the symptoms have not been associated with breast engorgement, chills, dizziness, fever, nausea or vomiting The patient has been using anticoagulants, while she denies the use of oral contraceptives       The following portions of the patient's history were reviewed and updated as appropriate: allergies, current medications, past family history, past medical history, past social history, past surgical history and problem list.    Allergies:  No Known Allergies    Social History:  Social History     Socioeconomic History   • Marital status:      Spouse name: Not on file   • Number of children: Not on file   • Years of education: Not on file   • Highest education level: Not on file   Tobacco Use   • Smoking status: Former Smoker     Packs/day: 0.05     Types: Cigarettes     Start date: 1994   • Smokeless tobacco: Never Used   Substance and Sexual Activity   • Alcohol use: Yes     Frequency: Monthly or less     Drinks per session: 1 or 2     Binge frequency: Never   • Drug use: No   • Sexual activity: Defer       Family History:  Family History   Problem Relation Age of Onset   • Hyperlipidemia Mother    • Hypertension Mother    • Hypertension Father    • Factor V Leiden deficiency Father    • Factor V Leiden deficiency Brother    • Cancer Maternal Grandfather          malignant neoplasm of gastrointestinal tract    • Down syndrome Maternal Cousin        Past Medical History  :  Patient Active Problem List   Diagnosis   • Age-related cataract of both eyes   • Allergic rhinitis due to pollen   • Anxiety and depression   • Arthralgia of multiple joints   • Encounter for annual general medical examination with abnormal findings in adult   • Encounter for screening mammogram for malignant neoplasm of breast   • Gastroesophageal reflux disease without esophagitis   • Heterozygous factor V Leiden mutation (CMS/HCC)   • History of DVT (deep vein thrombosis)   • History of pulmonary embolism   • Hypertension, benign   • Lumbar disc disease with radiculopathy   • Overweight (BMI 25.0-29.9)   • Primary osteoarthritis of left knee   • Reactive airways dysfunction syndrome (CMS/HCC)   • Screening for cervical cancer   • History of tobacco use   • Fibromyalgia   • Primary osteoarthritis of left hip   • Chronic bilateral low back pain with bilateral sciatica   • Abnormal perimenopausal bleeding       Medication List:  Outpatient Encounter Medications as of 5/19/2020   Medication Sig Dispense Refill   • acetaminophen (TYLENOL) 325 MG tablet Take 1-2 tablets by mouth Every 4 (Four) Hours As Needed for Pain.     • atenolol (TENORMIN) 25 MG tablet Take 1 tablet by mouth Daily. 90 tablet 3   • Diclofenac Sodium (PENNSAID) 2 % solution Place 2 Pump on the skin as directed by provider 2 (Two) Times a Day As Needed (pain). 112 g 11   • DULoxetine (CYMBALTA) 60 MG capsule TAKE 1 CAPSULE DAILY 90 capsule 3   • HYDROcodone-acetaminophen (NORCO) 5-325 MG per tablet Take 1 tablet by mouth Every 6 (Six) Hours As Needed for Moderate Pain . 120 tablet 0   • loratadine (CLARITIN) 10 MG tablet Take 1 tablet by mouth Daily.     • losartan (COZAAR) 25 MG tablet Take 1 tablet by mouth Daily. 90 tablet 3   • montelukast (SINGULAIR) 10 MG tablet TAKE 1 TABLET DAILY WITH ANANTIHISTAMINE FOR ALLERGIES 90 tablet 3   • pseudoephedrine (SUDAFED) 30 MG tablet Take 1 tablet by mouth Every 4 (Four) Hours.     • RABEprazole  "(ACIPHEX) 20 MG EC tablet Take 1 tablet by mouth Daily As Needed.  4   • XARELTO 20 MG tablet Take 20 mg by mouth Daily.     • [DISCONTINUED] celecoxib (CeleBREX) 200 MG capsule Take 1 capsule by mouth Daily As Needed for Mild Pain . 30 capsule 11   • [DISCONTINUED] VITAMIN D PO Take 50 Units by mouth Daily.       No facility-administered encounter medications on file as of 5/19/2020.        Past Surgical History:  History reviewed. No pertinent surgical history.    Review of Systems:  Review of Systems   Constitutional: Negative for appetite change, fatigue and fever.   HENT: Negative for congestion, ear pain, sinus pressure, sore throat and tinnitus.    Eyes: Negative for visual disturbance.   Respiratory: Negative for cough, shortness of breath and wheezing.    Cardiovascular: Negative for chest pain, palpitations and leg swelling.   Gastrointestinal: Negative for abdominal pain, constipation, diarrhea, nausea and vomiting.   Endocrine: Negative.  Negative for cold intolerance, heat intolerance, polydipsia and polyuria.   Genitourinary: Positive for menstrual problem and pelvic pressure. Negative for dysuria, frequency, hematuria, pelvic pain and vaginal discharge.   Musculoskeletal: Negative for arthralgias and myalgias.   Skin: Negative for rash.   Allergic/Immunologic: Negative for environmental allergies.   Neurological: Negative for dizziness, syncope, weakness and headache.   Hematological: Negative for adenopathy. Does not bruise/bleed easily.   Psychiatric/Behavioral: Negative for suicidal ideas and depressed mood. The patient is nervous/anxious.        I have reviewed and confirmed the accuracy of the ROS as documented by the MA/LPN/RN Kath Eisenberg MD    Vital Signs:  Visit Vitals  /80 (BP Location: Right arm, Patient Position: Sitting, Cuff Size: Adult)   Pulse 81   Temp 98.6 °F (37 °C) (Oral)   Resp 16   Ht 167.6 cm (66\")   Wt 92.7 kg (204 lb 6.4 oz)   LMP 05/16/2020   SpO2 96% Comment: Room " air   BMI 32.99 kg/m²       Physical Exam   Constitutional: She is oriented to person, place, and time. She appears well-developed and well-nourished. No distress.   Eyes: Conjunctivae are normal.   Neck: Neck supple. No JVD present. No thyromegaly present.   Cardiovascular: Normal rate, regular rhythm, normal heart sounds and intact distal pulses. Exam reveals no gallop and no friction rub.   No murmur heard.  Pulmonary/Chest: Effort normal and breath sounds normal. No respiratory distress. She has no wheezes. She has no rales.   Abdominal: Soft. Bowel sounds are normal. She exhibits no distension and no mass. There is no tenderness. Hernia confirmed negative in the right inguinal area and confirmed negative in the left inguinal area.   Genitourinary: Vagina normal and uterus normal. Pelvic exam was performed with patient supine. There is no rash on the right labia. There is no rash on the left labia.   Cervix is parous ( There is a mild amt of dark vaginal blood. ). Right adnexum displays no mass and no tenderness. Left adnexum displays no mass and no tenderness.   Musculoskeletal: She exhibits no edema.   Lymphadenopathy:     She has no cervical adenopathy.        Right: No inguinal adenopathy present.        Left: No inguinal adenopathy present.   Neurological: She is alert and oriented to person, place, and time. Coordination normal.   Skin: Skin is warm. No rash noted. No erythema.   Psychiatric: She has a normal mood and affect.   Procedures   Endometrial bx  The cx was prepped, and uterus sounded with a pippele  8 cm. The trochar was removed and moderate amt of endomentrial tissure was obtained.     Assessment and Plan:  Problem List Items Addressed This Visit        High    Heterozygous factor V Leiden mutation (CMS/HCC)    Overview     Father and brother also positive.  She is followed by hemaatology, she has hx of PE, she remain on Lovenox which maybe contributing to her heavy menses.           Hypertension, benign    Overview     Controlled         Current Assessment & Plan     Hypertension is improving with treatment.  Continue current treatment regimen.  Blood pressure will be reassessed at the next regular appointment.         Relevant Orders    Comprehensive Metabolic Panel (Completed)    TSH (Completed)       Low    Overweight (BMI 25.0-29.9)    Overview     Diet exercise and wt loss encouraged. Prediabetes understood. She has successfully lost 10 lbs. She is encouraged to continue life style modifications.          History of tobacco use       Unprioritized    Abnormal perimenopausal bleeding - Primary    Overview     3 episodes of bleeding         Relevant Orders    POCT urinalysis dipstick, manual (Completed)    POCT pregnancy, urine (Completed)    US Pelvis Transvaginal Non OB (Completed)    CBC & Differential (Completed)    Follicle Stimulating Hormone (Completed)    Luteinizing Hormone (Completed)    Reference Histopathology      Other Visit Diagnoses     Screening for hyperlipidemia        Relevant Orders    Lipid Panel With / Chol / HDL Ratio (Completed)    Cervical cancer screening        Relevant Orders    IGP, Aptima HPV, Rfx 16 / 18,45 (Completed)          An After Visit Summary and PPPS were given to the patient.     Site care done- cleaned site with alcohol,  tolerated well, dressing applied. Venipuncture was obtained after 1 time(s). 2 tubes were drawn. Needle ayesha used was 22.  Answers for HPI/ROS submitted by the patient on 5/17/2020   What is the primary reason for your visit?: Other  Please describe your symptoms.: Abnormal monthly cycles and high emotions surrounding this.  Have you had these symptoms before?: Yes  How long have you been having these symptoms?: Greater than 2 weeks  Please list any medications you are currently taking for this condition.: Acetaminophen, Norco  Please describe any probable cause for these symptoms. : thats I want to see doc.

## 2020-05-20 LAB
BASOPHILS # BLD AUTO: 0.1 X10E3/UL (ref 0–0.2)
BASOPHILS NFR BLD AUTO: 1 %
CHOLEST SERPL-MCNC: 231 MG/DL (ref 100–199)
CHOLEST/HDLC SERPL: 3.2 RATIO (ref 0–4.4)
EOSINOPHIL # BLD AUTO: 0.1 X10E3/UL (ref 0–0.4)
EOSINOPHIL NFR BLD AUTO: 1 %
ERYTHROCYTE [DISTWIDTH] IN BLOOD BY AUTOMATED COUNT: 12.6 % (ref 11.7–15.4)
FSH SERPL-ACNC: 27.1 MIU/ML
HCT VFR BLD AUTO: 44.4 % (ref 34–46.6)
HDLC SERPL-MCNC: 73 MG/DL
HGB BLD-MCNC: 15 G/DL (ref 11.1–15.9)
IMM GRANULOCYTES # BLD AUTO: 0 X10E3/UL (ref 0–0.1)
IMM GRANULOCYTES NFR BLD AUTO: 0 %
LDLC SERPL CALC-MCNC: 132 MG/DL (ref 0–99)
LH SERPL-ACNC: 7.3 MIU/ML
LYMPHOCYTES # BLD AUTO: 1.9 X10E3/UL (ref 0.7–3.1)
LYMPHOCYTES NFR BLD AUTO: 28 %
MCH RBC QN AUTO: 32.7 PG (ref 26.6–33)
MCHC RBC AUTO-ENTMCNC: 33.8 G/DL (ref 31.5–35.7)
MCV RBC AUTO: 97 FL (ref 79–97)
MONOCYTES # BLD AUTO: 0.5 X10E3/UL (ref 0.1–0.9)
MONOCYTES NFR BLD AUTO: 7 %
NEUTROPHILS # BLD AUTO: 4.4 X10E3/UL (ref 1.4–7)
NEUTROPHILS NFR BLD AUTO: 63 %
PLATELET # BLD AUTO: 240 X10E3/UL (ref 150–450)
RBC # BLD AUTO: 4.59 X10E6/UL (ref 3.77–5.28)
TRIGL SERPL-MCNC: 132 MG/DL (ref 0–149)
VLDLC SERPL CALC-MCNC: 26 MG/DL (ref 5–40)
WBC # BLD AUTO: 6.9 X10E3/UL (ref 3.4–10.8)

## 2020-05-20 RX ORDER — CELECOXIB 200 MG/1
200 CAPSULE ORAL DAILY PRN
Qty: 30 CAPSULE | Refills: 0 | Status: SHIPPED | OUTPATIENT
Start: 2020-05-20 | End: 2020-06-19

## 2020-05-21 LAB
ALBUMIN SERPL-MCNC: 4.9 G/DL (ref 3.8–4.9)
ALBUMIN SERPL-MCNC: NORMAL G/DL
ALBUMIN/GLOB SERPL: 2.1 {RATIO} (ref 1.2–2.2)
ALP SERPL-CCNC: 79 IU/L (ref 39–117)
ALP SERPL-CCNC: NORMAL U/L
ALT SERPL-CCNC: 18 IU/L (ref 0–32)
ALT SERPL-CCNC: NORMAL U/L
AST SERPL-CCNC: 21 IU/L (ref 0–40)
AST SERPL-CCNC: NORMAL U/L
BILIRUB SERPL-MCNC: 0.6 MG/DL (ref 0–1.2)
BILIRUB SERPL-MCNC: NORMAL MG/DL
BUN SERPL-MCNC: 11 MG/DL (ref 6–24)
BUN SERPL-MCNC: NORMAL MG/DL
BUN/CREAT SERPL: 12 (ref 9–23)
CALCIUM SERPL-MCNC: 9.7 MG/DL (ref 8.7–10.2)
CALCIUM SERPL-MCNC: NORMAL MG/DL
CHLORIDE SERPL-SCNC: 102 MMOL/L (ref 96–106)
CHLORIDE SERPL-SCNC: NORMAL MMOL/L
CO2 SERPL-SCNC: 19 MMOL/L (ref 20–29)
CO2 SERPL-SCNC: NORMAL MMOL/L
CREAT SERPL-MCNC: 0.91 MG/DL (ref 0.57–1)
CREAT SERPL-MCNC: NORMAL MG/DL
CYTOLOGIST CVX/VAG CYTO: NORMAL
CYTOLOGY CVX/VAG DOC CYTO: NORMAL
CYTOLOGY CVX/VAG DOC THIN PREP: NORMAL
DX ICD CODE: NORMAL
GLOBULIN SER CALC-MCNC: 2.3 G/DL (ref 1.5–4.5)
GLUCOSE SERPL-MCNC: 102 MG/DL (ref 65–99)
GLUCOSE SERPL-MCNC: NORMAL MG/DL
HIV 1 & 2 AB SER-IMP: NORMAL
HPV I/H RISK 4 DNA CVX QL PROBE+SIG AMP: NEGATIVE
OTHER STN SPEC: NORMAL
PATHOLOGIST CVX/VAG CYTO: NORMAL
POTASSIUM SERPL-SCNC: 4.6 MMOL/L (ref 3.5–5.2)
POTASSIUM SERPL-SCNC: NORMAL MMOL/L
PROT SERPL-MCNC: 7.2 G/DL (ref 6–8.5)
PROT SERPL-MCNC: NORMAL G/DL
SODIUM SERPL-SCNC: 140 MMOL/L (ref 134–144)
SODIUM SERPL-SCNC: NORMAL MMOL/L
SPECIMEN STATUS: NORMAL
STAT OF ADQ CVX/VAG CYTO-IMP: NORMAL
TSH SERPL DL<=0.005 MIU/L-ACNC: 2.01 UIU/ML (ref 0.45–4.5)
TSH SERPL DL<=0.005 MIU/L-ACNC: NORMAL UIU/ML
WRITTEN AUTHORIZATION: NORMAL

## 2020-06-05 ENCOUNTER — TELEPHONE (OUTPATIENT)
Dept: FAMILY MEDICINE CLINIC | Facility: CLINIC | Age: 50
End: 2020-06-05

## 2020-06-05 NOTE — TELEPHONE ENCOUNTER
Patient called and said that she will need her records of her Tb test. She said that she will be in on Monday Morning to pick them up

## 2020-06-10 ENCOUNTER — OFFICE VISIT (OUTPATIENT)
Dept: PAIN MEDICINE | Facility: CLINIC | Age: 50
End: 2020-06-10

## 2020-06-10 VITALS
OXYGEN SATURATION: 97 % | SYSTOLIC BLOOD PRESSURE: 124 MMHG | HEIGHT: 66 IN | BODY MASS INDEX: 31.18 KG/M2 | WEIGHT: 194 LBS | RESPIRATION RATE: 16 BRPM | TEMPERATURE: 97.3 F | HEART RATE: 85 BPM | DIASTOLIC BLOOD PRESSURE: 85 MMHG

## 2020-06-10 DIAGNOSIS — M16.12 PRIMARY OSTEOARTHRITIS OF LEFT HIP: ICD-10-CM

## 2020-06-10 DIAGNOSIS — M54.42 CHRONIC BILATERAL LOW BACK PAIN WITH BILATERAL SCIATICA: ICD-10-CM

## 2020-06-10 DIAGNOSIS — M17.12 PRIMARY OSTEOARTHRITIS OF LEFT KNEE: ICD-10-CM

## 2020-06-10 DIAGNOSIS — M54.41 CHRONIC BILATERAL LOW BACK PAIN WITH BILATERAL SCIATICA: ICD-10-CM

## 2020-06-10 DIAGNOSIS — G89.29 CHRONIC BILATERAL LOW BACK PAIN WITH BILATERAL SCIATICA: ICD-10-CM

## 2020-06-10 DIAGNOSIS — M51.16 LUMBAR DISC DISEASE WITH RADICULOPATHY: ICD-10-CM

## 2020-06-10 DIAGNOSIS — M25.50 ARTHRALGIA OF MULTIPLE JOINTS: Primary | ICD-10-CM

## 2020-06-10 DIAGNOSIS — M79.7 FIBROMYALGIA: ICD-10-CM

## 2020-06-10 PROCEDURE — G0463 HOSPITAL OUTPT CLINIC VISIT: HCPCS | Performed by: PHYSICAL MEDICINE & REHABILITATION

## 2020-06-10 PROCEDURE — 99213 OFFICE O/P EST LOW 20 MIN: CPT | Performed by: PHYSICAL MEDICINE & REHABILITATION

## 2020-06-10 RX ORDER — HYDROCODONE BITARTRATE AND ACETAMINOPHEN 5; 325 MG/1; MG/1
1 TABLET ORAL EVERY 6 HOURS PRN
Qty: 120 TABLET | Refills: 0 | Status: SHIPPED | OUTPATIENT
Start: 2020-06-10 | End: 2020-09-09 | Stop reason: SDUPTHER

## 2020-06-10 NOTE — PROGRESS NOTES
Subjective   Giovanni Shin is a 50 y.o. female.     Chronic LBP radiating to b/l hips, pain in b/l hips and multiple joints, 8/10 at worst, 5/10 at best, always present, varies, began around 2010, worsening, aching, pressure, worse with all activity and inactivity both, interfers with ADLs, sleep, activity, failed chiropractor, PT, LESIs, RFA, was taking Norco 7.5mg BID prn in past. X-ray L hip with mild OA. 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 No FH of substance abuse.       The following portions of the patient's history were reviewed and updated as appropriate: allergies, current medications, past family history, past medical history, past social history, past surgical history and problem list.    Review of Systems   Constitutional: Positive for fatigue. Negative for chills and fever.   HENT: Negative for hearing loss and trouble swallowing.    Eyes: Positive for visual disturbance.   Respiratory: Positive for shortness of breath.    Cardiovascular: Positive for chest pain.   Gastrointestinal: Negative for abdominal pain, constipation, diarrhea, nausea and vomiting.   Genitourinary: Negative for urinary incontinence.   Musculoskeletal: Positive for arthralgias, back pain and joint swelling. Negative for myalgias and neck pain.   Neurological: Positive for headache. Negative for dizziness, weakness and numbness.       Objective   Physical Exam   Constitutional: She is oriented to person, place, and time. She appears well-developed and well-nourished.   HENT:   Head: Normocephalic and atraumatic.   Eyes: Pupils are equal, round, and reactive to light. EOM are normal.   Neck: Normal range of motion.   Cardiovascular: Normal rate, regular rhythm, normal heart sounds and intact distal pulses.    Pulmonary/Chest: Breath sounds normal.   Abdominal: Soft. Bowel sounds are normal. She exhibits no distension. There is no tenderness.   Neurological: She is alert and oriented to person, place, and time. She has normal strength and normal reflexes. She displays normal reflexes. No sensory deficit.   Psychiatric: She has a normal mood and affect. Her behavior is normal. Thought content normal.         Assessment/Plan   Giovanni was seen today for back pain.    Diagnoses and all orders for this visit:    Arthralgia of multiple joints    Chronic bilateral low back pain with bilateral sciatica    Fibromyalgia    Lumbar disc disease with radiculopathy    Primary osteoarthritis of left hip    Primary osteoarthritis of left knee        UDS in order 11/20/19.  Treatment plan will consist of continuing current medication as long as it remains effective and is necessary, while evaluating patient at each visit and determining if the medication can be lowered or discontinued, while also using nonopioid therapies to reduce reliance on opioids.  Began Belbuca 150mcg/hr BID, helped but made her drowsy, also with 75mcg/hr BID.  Did OK on Norco 5mg but needs time release meds with making her too hyper. Began Hysingla 20mg qdaily, still too drowsy.  Restarted Norco prn, needs only one refill today. Began Pennsaid BID prn, works very well.  Cont Cymbalta. Cannot tolerate Gabapentin, Lyrica.  Began Celebrex 200mg qdaily prn instead of Ibuprofen, less risk of GI bleed on Lovenox.  Failed LESIs, RFA.  RTC 3 months for f/u.      INSPECT REPORT     As part of the patient's treatment plan, I am prescribing controlled substances. The patient has been made aware of appropriate use of such medications, including potential risk of somnolence, limited ability to drive and/or work safely, and the potential for dependence or overdose. It has also bee made clear that these medications are for use by this patient only, without concomitant use of  alcohol or other substances unless prescribed.      Patient has completed prescribing agreement detailing terms of continued prescribing of controlled substances, including monitoring INSPECT reports, urine drug screening, and pill counts if necessary. The patient is aware that inappropriate use will results in cessation of prescribing such medications.     INSPECT report has been reviewed and scanned into the patient's chart.     As the clinician, I personally reviewed the INSPECT while the patient was in the office today.     History and physical exam exhibit continued safe and appropriate use of controlled substances.

## 2020-07-01 RX ORDER — RABEPRAZOLE SODIUM 20 MG/1
TABLET, DELAYED RELEASE ORAL DAILY PRN
Qty: 90 TABLET | OUTPATIENT
Start: 2020-07-01

## 2020-08-04 ENCOUNTER — APPOINTMENT (OUTPATIENT)
Dept: GENERAL RADIOLOGY | Facility: HOSPITAL | Age: 50
End: 2020-08-04

## 2020-08-04 ENCOUNTER — OFFICE VISIT (OUTPATIENT)
Dept: FAMILY MEDICINE CLINIC | Facility: CLINIC | Age: 50
End: 2020-08-04

## 2020-08-04 ENCOUNTER — HOSPITAL ENCOUNTER (OUTPATIENT)
Dept: GENERAL RADIOLOGY | Facility: HOSPITAL | Age: 50
Discharge: HOME | End: 2020-08-04
Admitting: FAMILY MEDICINE

## 2020-08-04 ENCOUNTER — HOSPITAL ENCOUNTER (OUTPATIENT)
Dept: GENERAL RADIOLOGY | Facility: HOSPITAL | Age: 50
Discharge: HOME | End: 2020-08-04

## 2020-08-04 VITALS
OXYGEN SATURATION: 98 % | SYSTOLIC BLOOD PRESSURE: 138 MMHG | RESPIRATION RATE: 16 BRPM | HEART RATE: 78 BPM | TEMPERATURE: 97.9 F | BODY MASS INDEX: 34.01 KG/M2 | HEIGHT: 66 IN | WEIGHT: 211.6 LBS | DIASTOLIC BLOOD PRESSURE: 78 MMHG

## 2020-08-04 DIAGNOSIS — R07.89 OTHER CHEST PAIN: ICD-10-CM

## 2020-08-04 DIAGNOSIS — M19.012 PRIMARY OSTEOARTHRITIS OF LEFT SHOULDER: ICD-10-CM

## 2020-08-04 DIAGNOSIS — M25.512 ACUTE PAIN OF LEFT SHOULDER: ICD-10-CM

## 2020-08-04 DIAGNOSIS — M89.8X1 CLAVICLE PAIN: Primary | ICD-10-CM

## 2020-08-04 DIAGNOSIS — Z86.711 HISTORY OF PULMONARY EMBOLISM: ICD-10-CM

## 2020-08-04 PROCEDURE — 99215 OFFICE O/P EST HI 40 MIN: CPT | Performed by: FAMILY MEDICINE

## 2020-08-04 PROCEDURE — 73030 X-RAY EXAM OF SHOULDER: CPT

## 2020-08-04 PROCEDURE — 71046 X-RAY EXAM CHEST 2 VIEWS: CPT

## 2020-08-04 PROCEDURE — 93000 ELECTROCARDIOGRAM COMPLETE: CPT | Performed by: FAMILY MEDICINE

## 2020-08-04 PROCEDURE — 73000 X-RAY EXAM OF COLLAR BONE: CPT

## 2020-08-04 NOTE — PROGRESS NOTES
Subjective   Giovanni Shin is a 50 y.o. female.     Chief Complaint   Patient presents with   • Clavicle pain   • Hypertension       Giovanni was last seen by Dr. Herrera- pain management for a follow up on lumbar disc disease on 06/10/2020. She was also seen by Dr. Rusty Taveras- dentist on 07/28/2020 for a 6 month cleaning.    Clavicle pain:  Giovanni is here today with complaints of left clavicle pain. She denies any injury to this area. Onset was 1 week ago on 07/31/2020. She describes this as an ache that is rated a 2-3 on pain scale.    Hypertension   This is a chronic problem. The current episode started more than 1 year ago. The problem is controlled. Associated symptoms include chest pain (Some times associated with the left shoulder pain that raadiates into the posterior shoulder. The pain is dull and aching. Appears with movement and relieved by rest.). Pertinent negatives include no headaches, orthopnea, palpitations, peripheral edema, PND, shortness of breath or sweats. Risk factors for coronary artery disease include obesity. Current antihypertension treatment includes angiotensin blockers and beta blockers. The current treatment provides significant improvement.   Chest Pain    This is a new problem. The current episode started 1 to 4 weeks ago. The onset quality is gradual. The problem occurs intermittently. The problem has been unchanged. The pain is present in the substernal region (left shoulder). The pain is at a severity of 5/10. The pain is mild. The quality of the pain is described as dull and pressure. The pain radiates to the left shoulder. Pertinent negatives include no abdominal pain, fever, headaches, nausea, near-syncope, numbness, orthopnea, palpitations, PND, shortness of breath, syncope, vomiting or weakness. The pain is aggravated by movement. She has tried nothing for the symptoms. The treatment provided moderate relief.        The following portions of the patient's history were reviewed  and updated as appropriate: allergies, current medications, past family history, past medical history, past social history, past surgical history and problem list.    Allergies:  Allergies   Allergen Reactions   • Hysingla Er [Hydrocodone Bitartrate Er] Dizziness     MAKES PATIENT SLEEPY        Social History:  Social History     Socioeconomic History   • Marital status:      Spouse name: Not on file   • Number of children: Not on file   • Years of education: Not on file   • Highest education level: Not on file   Tobacco Use   • Smoking status: Former Smoker     Packs/day: 0.05     Years: 11.00     Pack years: 0.55     Types: Cigarettes     Start date: 2006     Last attempt to quit: 2017     Years since quitting: 3.6   • Smokeless tobacco: Never Used   Substance and Sexual Activity   • Alcohol use: Yes     Frequency: Monthly or less     Drinks per session: 1 or 2     Binge frequency: Never   • Drug use: No   • Sexual activity: Defer       Family History:  Family History   Problem Relation Age of Onset   • Hyperlipidemia Mother    • Hypertension Mother    • Hypertension Father    • Factor V Leiden deficiency Father    • Factor V Leiden deficiency Brother    • Cancer Maternal Grandfather          malignant neoplasm of gastrointestinal tract    • Down syndrome Maternal Cousin        Past Medical History :  Patient Active Problem List   Diagnosis   • Age-related cataract of both eyes   • Allergic rhinitis due to pollen   • Anxiety and depression   • Arthralgia of multiple joints   • Encounter for annual general medical examination with abnormal findings in adult   • Encounter for screening mammogram for malignant neoplasm of breast   • Gastroesophageal reflux disease without esophagitis   • Heterozygous factor V Leiden mutation (CMS/HCC)   • History of DVT (deep vein thrombosis)   • History of pulmonary embolism   • Hypertension, benign   • Lumbar disc disease with radiculopathy   • Overweight (BMI 25.0-29.9)   •  Primary osteoarthritis of left knee   • Reactive airways dysfunction syndrome (CMS/HCC)   • Screening for cervical cancer   • History of tobacco use   • Fibromyalgia   • Primary osteoarthritis of left hip   • Chronic bilateral low back pain with bilateral sciatica   • Abnormal perimenopausal bleeding   • Primary osteoarthritis of left shoulder       Medication List:  Outpatient Encounter Medications as of 8/4/2020   Medication Sig Dispense Refill   • acetaminophen (TYLENOL) 325 MG tablet Take 1-2 tablets by mouth Every 4 (Four) Hours As Needed for Pain.     • atenolol (TENORMIN) 25 MG tablet Take 1 tablet by mouth Daily. 90 tablet 3   • Diclofenac Sodium (Pennsaid) 2 % solution Place 2 Pump on the skin as directed by provider 2 (Two) Times a Day As Needed (pain). 112 g 11   • DULoxetine (CYMBALTA) 60 MG capsule TAKE 1 CAPSULE DAILY 90 capsule 3   • HYDROcodone-acetaminophen (Norco) 5-325 MG per tablet Take 1 tablet by mouth Every 6 (Six) Hours As Needed for Moderate Pain . 120 tablet 0   • loratadine (CLARITIN) 10 MG tablet Take 1 tablet by mouth Daily.     • losartan (COZAAR) 25 MG tablet Take 1 tablet by mouth Daily. 90 tablet 3   • montelukast (SINGULAIR) 10 MG tablet TAKE 1 TABLET DAILY WITH ANANTIHISTAMINE FOR ALLERGIES 90 tablet 3   • Multiple Vitamins-Minerals (MULTIVITAMIN ADULT PO) Take 1 tablet by mouth Daily.     • pseudoephedrine (SUDAFED) 30 MG tablet Take 1 tablet by mouth Every 4 (Four) Hours.     • RABEprazole (ACIPHEX) 20 MG EC tablet Take 1 tablet by mouth Daily As Needed.  4   • XARELTO 20 MG tablet Take 20 mg by mouth Daily.       No facility-administered encounter medications on file as of 8/4/2020.        Past Surgical History:  History reviewed. No pertinent surgical history.    Review of Systems:  Review of Systems   Constitutional: Negative for fatigue and fever.   Eyes: Negative for visual disturbance.   Respiratory: Negative for shortness of breath and wheezing.    Cardiovascular: Positive  "for chest pain (Some times associated with the left shoulder pain that raadiates into the posterior shoulder. The pain is dull and aching. Appears with movement and relieved by rest.). Negative for palpitations, orthopnea, syncope, PND and near-syncope.   Gastrointestinal: Negative for abdominal pain, nausea and vomiting.   Endocrine: Negative for cold intolerance, heat intolerance, polydipsia and polyuria.   Genitourinary: Negative for dysuria.   Musculoskeletal: Positive for arthralgias (left clavicle and shoulder The pain is aggravated by movement and relieved by rest, It has not been assoiciated with nausea vomiting or diaphoresis. ). Negative for joint swelling and myalgias.   Skin: Negative for rash.   Neurological: Negative for weakness and numbness.   Hematological: Negative for adenopathy. Does not bruise/bleed easily.   Psychiatric/Behavioral: The patient is not nervous/anxious.        I have reviewed and confirmed the accuracy of the ROS as documented by the MA/LPN/RN Kath Eisenberg MD    Vital Signs:  Visit Vitals  /78 (BP Location: Right arm, Patient Position: Sitting, Cuff Size: Large Adult)   Pulse 78   Temp 97.9 °F (36.6 °C) (Oral)   Resp 16   Ht 167.6 cm (66\")   Wt 96 kg (211 lb 9.6 oz)   LMP 06/19/2020   SpO2 98% Comment: Room air   BMI 34.15 kg/m²       Physical Exam   Constitutional: She is oriented to person, place, and time. She appears well-developed and well-nourished. No distress.   HENT:   Right Ear: Tympanic membrane and external ear normal.   Left Ear: Tympanic membrane and external ear normal.   Mouth/Throat: Oropharynx is clear and moist.   Eyes: Conjunctivae are normal.   Neck: Neck supple. No JVD present. No thyromegaly present.   Cardiovascular: Normal rate, regular rhythm and normal heart sounds. Exam reveals no gallop and no friction rub.   No murmur heard.  Pulses:       Carotid pulses are 2+ on the right side, and 2+ on the left side.       Radial pulses are 2+ on the " right side, and 2+ on the left side.        Dorsalis pedis pulses are 2+ on the right side, and 2+ on the left side.   No edema negative Karlo's   Pulmonary/Chest: Effort normal and breath sounds normal. No respiratory distress. She has no wheezes. She has no rales.   Musculoskeletal: She exhibits no edema.        Left shoulder: She exhibits decreased range of motion and tenderness ( mild over the distal shoulder). She exhibits no swelling, no deformity, normal pulse and normal strength.   Lymphadenopathy:     She has no cervical adenopathy.   Neurological: She is alert and oriented to person, place, and time. She displays normal reflexes. No sensory deficit. She exhibits normal muscle tone. Coordination normal.   Skin: Skin is warm. No rash noted. No erythema.   Psychiatric: She has a normal mood and affect.           ECG 12 Lead  Date/Time: 8/4/2020 10:03 AM  Performed by: Kath Eisenberg MD  Authorized by: Kath Eisenberg MD   Comparison: compared with previous ECG   Rhythm: sinus rhythm  Rate: normal  Conduction: conduction normal  ST Segments: ST segments normal  T Waves: T waves normal  QRS axis: normal    Clinical impression: normal ECG          Assessment and Plan:  Problem List Items Addressed This Visit        Low    History of pulmonary embolism    Overview     06/2017, Followed with Hematology, Factor V deficiency  She has had no recurrence on anti coagulation.              Unprioritized    Primary osteoarthritis of left shoulder    Overview     At the AC joint on xray, 08/04/2020  Ice ROM tylenol systemic steroids and follow  Ortho referral if no improvement.            Other Visit Diagnoses     Clavicle pain    -  Primary    Relevant Orders    XR Clavicle Left (Completed)    Acute pain of left shoulder        Relevant Orders    XR Shoulder 2+ View Right    XR Shoulder 2+ View Left (Completed)    Other chest pain        Negative exam, positive risk factors, negative EKG, sxs consisitnet with  arhtritis of shoulder. Will follow further workup if sxs worsen.     Relevant Orders    XR Chest PA & Lateral (Completed)    ECG 12 Lead          An After Visit Summary and PPPS were given to the patient.

## 2020-08-26 PROCEDURE — 86580 TB INTRADERMAL TEST: CPT | Performed by: FAMILY MEDICINE

## 2020-08-28 ENCOUNTER — CLINICAL SUPPORT (OUTPATIENT)
Dept: FAMILY MEDICINE CLINIC | Facility: CLINIC | Age: 50
End: 2020-08-28

## 2020-08-28 DIAGNOSIS — Z11.1 ENCOUNTER FOR PPD SKIN TEST READING: Primary | ICD-10-CM

## 2020-09-09 ENCOUNTER — RESULTS ENCOUNTER (OUTPATIENT)
Dept: PAIN MEDICINE | Facility: CLINIC | Age: 50
End: 2020-09-09

## 2020-09-09 ENCOUNTER — OFFICE VISIT (OUTPATIENT)
Dept: PAIN MEDICINE | Facility: CLINIC | Age: 50
End: 2020-09-09

## 2020-09-09 VITALS
OXYGEN SATURATION: 100 % | SYSTOLIC BLOOD PRESSURE: 142 MMHG | DIASTOLIC BLOOD PRESSURE: 82 MMHG | RESPIRATION RATE: 16 BRPM | TEMPERATURE: 97.1 F | BODY MASS INDEX: 32.14 KG/M2 | HEIGHT: 66 IN | WEIGHT: 200 LBS | HEART RATE: 98 BPM

## 2020-09-09 DIAGNOSIS — M25.50 ARTHRALGIA OF MULTIPLE JOINTS: ICD-10-CM

## 2020-09-09 DIAGNOSIS — M19.012 PRIMARY OSTEOARTHRITIS OF LEFT SHOULDER: ICD-10-CM

## 2020-09-09 DIAGNOSIS — M17.12 PRIMARY OSTEOARTHRITIS OF LEFT KNEE: ICD-10-CM

## 2020-09-09 DIAGNOSIS — Z79.899 MEDICATION MANAGEMENT: ICD-10-CM

## 2020-09-09 DIAGNOSIS — M51.16 LUMBAR DISC DISEASE WITH RADICULOPATHY: ICD-10-CM

## 2020-09-09 DIAGNOSIS — M79.7 FIBROMYALGIA: ICD-10-CM

## 2020-09-09 DIAGNOSIS — M54.42 CHRONIC BILATERAL LOW BACK PAIN WITH BILATERAL SCIATICA: Primary | ICD-10-CM

## 2020-09-09 DIAGNOSIS — G89.29 CHRONIC BILATERAL LOW BACK PAIN WITH BILATERAL SCIATICA: Primary | ICD-10-CM

## 2020-09-09 DIAGNOSIS — M54.41 CHRONIC BILATERAL LOW BACK PAIN WITH BILATERAL SCIATICA: Primary | ICD-10-CM

## 2020-09-09 DIAGNOSIS — M16.12 PRIMARY OSTEOARTHRITIS OF LEFT HIP: ICD-10-CM

## 2020-09-09 PROCEDURE — G0463 HOSPITAL OUTPT CLINIC VISIT: HCPCS | Performed by: PHYSICAL MEDICINE & REHABILITATION

## 2020-09-09 PROCEDURE — 99213 OFFICE O/P EST LOW 20 MIN: CPT | Performed by: PHYSICAL MEDICINE & REHABILITATION

## 2020-09-09 RX ORDER — CELECOXIB 200 MG/1
200 CAPSULE ORAL DAILY
COMMUNITY
Start: 2020-08-19 | End: 2021-02-08

## 2020-09-09 RX ORDER — HYDROCODONE BITARTRATE AND ACETAMINOPHEN 5; 325 MG/1; MG/1
1 TABLET ORAL EVERY 6 HOURS PRN
Qty: 120 TABLET | Refills: 0 | Status: SHIPPED | OUTPATIENT
Start: 2020-09-09 | End: 2020-09-09 | Stop reason: SDUPTHER

## 2020-09-09 RX ORDER — HYDROCODONE BITARTRATE AND ACETAMINOPHEN 5; 325 MG/1; MG/1
1 TABLET ORAL EVERY 6 HOURS PRN
Qty: 120 TABLET | Refills: 0 | Status: SHIPPED | OUTPATIENT
Start: 2020-09-09 | End: 2020-12-09

## 2020-09-09 NOTE — PROGRESS NOTES
Subjective   Giovanni Shin is a 50 y.o. female.     Chronic LBP radiating to b/l hips, pain in b/l hips and multiple joints, 8/10 at worst, 5/10 at best, always present, varies, began around 2010, worsening, aching, pressure, worse with all activity and inactivity both, interfers with ADLs, sleep, activity, failed chiropractor, PT, LESIs, RFA, was taking Norco 7.5mg BID prn in past. X-ray L hip with mild OA. 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 No FH of substance abuse.       The following portions of the patient's history were reviewed and updated as appropriate: allergies, current medications, past family history, past medical history, past social history, past surgical history and problem list.    Review of Systems   Constitutional: Positive for fatigue. Negative for chills and fever.   HENT: Negative for hearing loss and trouble swallowing.    Eyes: Positive for visual disturbance.   Respiratory: Positive for shortness of breath.    Cardiovascular: Positive for chest pain.   Gastrointestinal: Negative for abdominal pain, constipation, diarrhea, nausea and vomiting.   Genitourinary: Negative for urinary incontinence.   Musculoskeletal: Positive for arthralgias, back pain and joint swelling. Negative for myalgias and neck pain.   Neurological: Positive for headache. Negative for dizziness, weakness and numbness.       Objective   Physical Exam   Constitutional: She is oriented to person, place, and time. She appears well-developed and well-nourished.   HENT:   Head: Normocephalic and atraumatic.   Eyes: Pupils are equal, round, and reactive to light. EOM are normal.   Neck: Normal range of motion.   Cardiovascular: Normal rate, regular rhythm, normal heart sounds and intact distal pulses.    Pulmonary/Chest: Breath sounds normal.   Abdominal: Soft. Bowel sounds are normal. She exhibits no distension. There is no tenderness.   Neurological: She is alert and oriented to person, place, and time. She has normal strength and normal reflexes. She displays normal reflexes. No sensory deficit.   Psychiatric: She has a normal mood and affect. Her behavior is normal. Thought content normal.         Assessment/Plan   Giovanni was seen today for back pain and shoulder pain.    Diagnoses and all orders for this visit:    Chronic bilateral low back pain with bilateral sciatica    Fibromyalgia    Lumbar disc disease with radiculopathy    Arthralgia of multiple joints    Primary osteoarthritis of left hip    Primary osteoarthritis of left knee    Primary osteoarthritis of left shoulder        UDS in order 11/20/19.  Treatment plan will consist of continuing current medication as long as it remains effective and is necessary, while evaluating patient at each visit and determining if the medication can be lowered or discontinued, while also using nonopioid therapies to reduce reliance on opioids.  Began Belbuca 150mcg/hr BID, helped but made her drowsy, also with 75mcg/hr BID.  Did OK on Norco 5mg but needs time release meds with making her too hyper. Began Hysingla 20mg qdaily, still too drowsy.  Restarted Norco QID prn. Began Pennsaid BID prn, works very well.  Cont Cymbalta. Cannot tolerate Gabapentin, Lyrica.  Began Celebrex 200mg qdaily prn instead of Ibuprofen, less risk of GI bleed on Lovenox.  Failed LESIs, RFA.  RTC 3 months for f/u.      INSPECT REPORT     As part of the patient's treatment plan, I am prescribing controlled substances. The patient has been made aware of appropriate use of such medications, including potential risk of somnolence, limited ability to drive and/or work safely, and the potential for dependence or overdose. It has also bee made clear that these medications are for use by this patient  only, without concomitant use of alcohol or other substances unless prescribed.      Patient has completed prescribing agreement detailing terms of continued prescribing of controlled substances, including monitoring INSPECT reports, urine drug screening, and pill counts if necessary. The patient is aware that inappropriate use will results in cessation of prescribing such medications.     INSPECT report has been reviewed and scanned into the patient's chart.     As the clinician, I personally reviewed the INSPECT while the patient was in the office today.     History and physical exam exhibit continued safe and appropriate use of controlled substances.

## 2020-10-05 ENCOUNTER — TELEPHONE (OUTPATIENT)
Dept: FAMILY MEDICINE CLINIC | Facility: CLINIC | Age: 50
End: 2020-10-05

## 2020-10-05 NOTE — TELEPHONE ENCOUNTER
Giovanni called reports she is having heavy bleeding again, and wants referral to Dr Lozano called that office , gave them info , they will call her and arrange appointment.

## 2020-10-05 NOTE — TELEPHONE ENCOUNTER
Dr. Lozano's office called and said that they are seeing Lavada at 4 pm today and needs the office note, ultrasound and biopsy faxed to .  Thanks Jennifer

## 2020-11-20 RX ORDER — DICLOFENAC SODIUM 20 MG/G
SOLUTION TOPICAL
Qty: 112 G | Refills: 11 | Status: SHIPPED | OUTPATIENT
Start: 2020-11-20 | End: 2023-03-22

## 2020-12-09 ENCOUNTER — OFFICE VISIT (OUTPATIENT)
Dept: PAIN MEDICINE | Facility: CLINIC | Age: 50
End: 2020-12-09

## 2020-12-09 VITALS
DIASTOLIC BLOOD PRESSURE: 88 MMHG | BODY MASS INDEX: 33.27 KG/M2 | HEART RATE: 75 BPM | TEMPERATURE: 97.5 F | SYSTOLIC BLOOD PRESSURE: 132 MMHG | RESPIRATION RATE: 16 BRPM | WEIGHT: 207 LBS | OXYGEN SATURATION: 98 % | HEIGHT: 66 IN

## 2020-12-09 DIAGNOSIS — M51.16 LUMBAR DISC DISEASE WITH RADICULOPATHY: ICD-10-CM

## 2020-12-09 DIAGNOSIS — M54.42 CHRONIC BILATERAL LOW BACK PAIN WITH BILATERAL SCIATICA: Primary | ICD-10-CM

## 2020-12-09 DIAGNOSIS — M17.12 PRIMARY OSTEOARTHRITIS OF LEFT KNEE: ICD-10-CM

## 2020-12-09 DIAGNOSIS — G89.29 CHRONIC BILATERAL LOW BACK PAIN WITH BILATERAL SCIATICA: Primary | ICD-10-CM

## 2020-12-09 DIAGNOSIS — M25.50 ARTHRALGIA OF MULTIPLE JOINTS: ICD-10-CM

## 2020-12-09 DIAGNOSIS — M19.012 PRIMARY OSTEOARTHRITIS OF LEFT SHOULDER: ICD-10-CM

## 2020-12-09 DIAGNOSIS — M54.41 CHRONIC BILATERAL LOW BACK PAIN WITH BILATERAL SCIATICA: Primary | ICD-10-CM

## 2020-12-09 DIAGNOSIS — M16.12 PRIMARY OSTEOARTHRITIS OF LEFT HIP: ICD-10-CM

## 2020-12-09 DIAGNOSIS — M79.7 FIBROMYALGIA: ICD-10-CM

## 2020-12-09 PROCEDURE — G0463 HOSPITAL OUTPT CLINIC VISIT: HCPCS | Performed by: PHYSICAL MEDICINE & REHABILITATION

## 2020-12-09 PROCEDURE — 99214 OFFICE O/P EST MOD 30 MIN: CPT | Performed by: PHYSICAL MEDICINE & REHABILITATION

## 2020-12-09 RX ORDER — HYDROCODONE BITARTRATE AND ACETAMINOPHEN 7.5; 325 MG/1; MG/1
1 TABLET ORAL EVERY 6 HOURS PRN
Qty: 120 TABLET | Refills: 0 | Status: SHIPPED | OUTPATIENT
Start: 2020-12-09 | End: 2020-12-09 | Stop reason: SDUPTHER

## 2020-12-09 RX ORDER — HYDROCODONE BITARTRATE AND ACETAMINOPHEN 7.5; 325 MG/1; MG/1
1 TABLET ORAL EVERY 6 HOURS PRN
Qty: 120 TABLET | Refills: 0 | Status: SHIPPED | OUTPATIENT
Start: 2020-12-09 | End: 2021-03-08 | Stop reason: SDUPTHER

## 2020-12-09 NOTE — PROGRESS NOTES
Subjective   Giovanni Shin is a 50 y.o. female.     Chronic LBP radiating to b/l hips, pain in b/l hips and multiple joints, 8/10 at worst, 5/10 at best, always present, varies, began around 2010, worsening, aching, pressure, worse with all activity and inactivity both, interfers with ADLs, sleep, activity, failed chiropractor, PT, LESIs, RFA, was taking Norco 7.5mg BID prn in past. X-ray L hip with mild OA. 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 No FH of substance abuse.       The following portions of the patient's history were reviewed and updated as appropriate: allergies, current medications, past family history, past medical history, past social history, past surgical history and problem list.    Review of Systems   Constitutional: Positive for fatigue. Negative for chills and fever.   HENT: Negative for hearing loss and trouble swallowing.    Eyes: Positive for visual disturbance.   Respiratory: Positive for shortness of breath.    Cardiovascular: Positive for chest pain.   Gastrointestinal: Negative for abdominal pain, constipation, diarrhea, nausea and vomiting.   Genitourinary: Negative for urinary incontinence.   Musculoskeletal: Positive for arthralgias, back pain and joint swelling. Negative for myalgias and neck pain.   Neurological: Positive for headache. Negative for dizziness, weakness and numbness.       Objective   Physical Exam   Constitutional: She is oriented to person, place, and time. She appears well-developed and well-nourished.   HENT:   Head: Normocephalic and atraumatic.   Eyes: Pupils are equal, round, and reactive to light.   Neck: Normal range of motion.   Cardiovascular: Normal rate, regular rhythm and normal heart sounds.   Pulmonary/Chest: Breath sounds normal.   Abdominal:  Soft. Bowel sounds are normal. She exhibits no distension. There is no abdominal tenderness.   Neurological: She is alert and oriented to person, place, and time. She has normal reflexes. She displays normal reflexes. No sensory deficit.   Psychiatric: Her behavior is normal. Thought content normal.         Assessment/Plan   Diagnoses and all orders for this visit:    1. Chronic bilateral low back pain with bilateral sciatica (Primary)    2. Lumbar disc disease with radiculopathy    3. Fibromyalgia    4. Primary osteoarthritis of left hip    5. Primary osteoarthritis of left knee    6. Primary osteoarthritis of left shoulder    7. Arthralgia of multiple joints        UDS in order 9/9/20.  Treatment plan will consist of continuing current medication as long as it remains effective and is necessary, while evaluating patient at each visit and determining if the medication can be lowered or discontinued, while also using nonopioid therapies to reduce reliance on opioids.  Began Belbuca 150mcg/hr BID, helped but made her drowsy, also with 75mcg/hr BID.  Did OK on Norco 5mg but needs time release meds with making her too hyper. Began Hysingla 20mg qdaily, still too drowsy.  Restarted Norco 5mg QID prn, increase to 7.5mg QID prn.   Began Pennsaid BID prn, works very well.  Cont Cymbalta. Cannot tolerate Gabapentin, Lyrica.  Began Celebrex 200mg qdaily prn instead of Ibuprofen, less risk of GI bleed on Lovenox.  Failed LESIs, RFA.  RTC 3 months for f/u.      INSPECT REPORT     As part of the patient's treatment plan, I am prescribing controlled substances. The patient has been made aware of appropriate use of such medications, including potential risk of somnolence, limited ability to drive and/or work safely, and the potential for dependence or overdose. It has also bee made clear that these medications are for use by this patient only, without concomitant use of alcohol or other substances unless prescribed.      Patient  has completed prescribing agreement detailing terms of continued prescribing of controlled substances, including monitoring INSPECT reports, urine drug screening, and pill counts if necessary. The patient is aware that inappropriate use will results in cessation of prescribing such medications.     INSPECT report has been reviewed and scanned into the patient's chart.     As the clinician, I personally reviewed the INSPECT while the patient was in the office today.     History and physical exam exhibit continued safe and appropriate use of controlled substances.

## 2020-12-28 ENCOUNTER — PATIENT MESSAGE (OUTPATIENT)
Dept: FAMILY MEDICINE CLINIC | Facility: CLINIC | Age: 50
End: 2020-12-28

## 2021-01-20 ENCOUNTER — PATIENT MESSAGE (OUTPATIENT)
Dept: FAMILY MEDICINE CLINIC | Facility: CLINIC | Age: 51
End: 2021-01-20

## 2021-02-08 DIAGNOSIS — F32.A ANXIETY AND DEPRESSION: Primary | ICD-10-CM

## 2021-02-08 DIAGNOSIS — F41.9 ANXIETY AND DEPRESSION: Primary | ICD-10-CM

## 2021-02-08 DIAGNOSIS — I10 HYPERTENSION, BENIGN: ICD-10-CM

## 2021-02-08 RX ORDER — CELECOXIB 200 MG/1
CAPSULE ORAL
Qty: 30 CAPSULE | Refills: 11 | Status: SHIPPED | OUTPATIENT
Start: 2021-02-08 | End: 2021-03-03

## 2021-02-08 RX ORDER — LOSARTAN POTASSIUM 25 MG/1
25 TABLET ORAL DAILY
Qty: 90 TABLET | Refills: 3 | Status: SHIPPED | OUTPATIENT
Start: 2021-02-08 | End: 2022-02-15 | Stop reason: SDUPTHER

## 2021-02-08 RX ORDER — DULOXETIN HYDROCHLORIDE 60 MG/1
60 CAPSULE, DELAYED RELEASE ORAL DAILY
Qty: 90 CAPSULE | Refills: 3 | Status: SHIPPED | OUTPATIENT
Start: 2021-02-08 | End: 2021-11-29 | Stop reason: SDUPTHER

## 2021-02-08 RX ORDER — ATENOLOL 25 MG/1
25 TABLET ORAL DAILY
Qty: 90 TABLET | Refills: 3 | Status: SHIPPED | OUTPATIENT
Start: 2021-02-08 | End: 2022-02-21 | Stop reason: SDUPTHER

## 2021-03-01 PROBLEM — Z78.0 ASYMPTOMATIC MENOPAUSAL STATE: Status: ACTIVE | Noted: 2021-03-01

## 2021-03-01 PROBLEM — Z12.11 COLON CANCER SCREENING: Status: ACTIVE | Noted: 2021-03-01

## 2021-03-03 ENCOUNTER — OFFICE VISIT (OUTPATIENT)
Dept: FAMILY MEDICINE CLINIC | Facility: CLINIC | Age: 51
End: 2021-03-03

## 2021-03-03 VITALS
DIASTOLIC BLOOD PRESSURE: 70 MMHG | HEIGHT: 66 IN | BODY MASS INDEX: 36.1 KG/M2 | OXYGEN SATURATION: 98 % | WEIGHT: 224.6 LBS | HEART RATE: 98 BPM | TEMPERATURE: 97.1 F | SYSTOLIC BLOOD PRESSURE: 130 MMHG | RESPIRATION RATE: 16 BRPM

## 2021-03-03 DIAGNOSIS — Z13.220 SCREENING FOR HYPERLIPIDEMIA: ICD-10-CM

## 2021-03-03 DIAGNOSIS — Z12.4 SCREENING FOR CERVICAL CANCER: ICD-10-CM

## 2021-03-03 DIAGNOSIS — Z11.59 NEED FOR HEPATITIS C SCREENING TEST: ICD-10-CM

## 2021-03-03 DIAGNOSIS — Z86.718 HISTORY OF DVT (DEEP VEIN THROMBOSIS): ICD-10-CM

## 2021-03-03 DIAGNOSIS — I10 HYPERTENSION, BENIGN: ICD-10-CM

## 2021-03-03 DIAGNOSIS — E66.3 OVERWEIGHT (BMI 25.0-29.9): ICD-10-CM

## 2021-03-03 DIAGNOSIS — Z12.11 COLON CANCER SCREENING: ICD-10-CM

## 2021-03-03 DIAGNOSIS — Z97.5 PRESENCE OF 52 MG LEVONORGESTREL-RELEASING INTRAUTERINE DEVICE (IUD): ICD-10-CM

## 2021-03-03 DIAGNOSIS — Z00.01 ENCOUNTER FOR ANNUAL GENERAL MEDICAL EXAMINATION WITH ABNORMAL FINDINGS IN ADULT: Primary | ICD-10-CM

## 2021-03-03 DIAGNOSIS — Z87.891 HISTORY OF TOBACCO USE: ICD-10-CM

## 2021-03-03 DIAGNOSIS — Z12.31 ENCOUNTER FOR SCREENING MAMMOGRAM FOR MALIGNANT NEOPLASM OF BREAST: ICD-10-CM

## 2021-03-03 DIAGNOSIS — F32.A ANXIETY AND DEPRESSION: ICD-10-CM

## 2021-03-03 DIAGNOSIS — Z78.0 ASYMPTOMATIC MENOPAUSAL STATE: ICD-10-CM

## 2021-03-03 DIAGNOSIS — Z86.711 HISTORY OF PULMONARY EMBOLISM: ICD-10-CM

## 2021-03-03 DIAGNOSIS — Z23 NEED FOR TDAP VACCINATION: ICD-10-CM

## 2021-03-03 DIAGNOSIS — M51.16 LUMBAR DISC DISEASE WITH RADICULOPATHY: ICD-10-CM

## 2021-03-03 DIAGNOSIS — D68.51 HETEROZYGOUS FACTOR V LEIDEN MUTATION (HCC): ICD-10-CM

## 2021-03-03 DIAGNOSIS — J30.1 SEASONAL ALLERGIC RHINITIS DUE TO POLLEN: ICD-10-CM

## 2021-03-03 DIAGNOSIS — M25.50 ARTHRALGIA OF MULTIPLE JOINTS: ICD-10-CM

## 2021-03-03 DIAGNOSIS — F41.9 ANXIETY AND DEPRESSION: ICD-10-CM

## 2021-03-03 DIAGNOSIS — K21.9 GASTROESOPHAGEAL REFLUX DISEASE WITHOUT ESOPHAGITIS: ICD-10-CM

## 2021-03-03 PROBLEM — N92.0 MENORRHAGIA WITH REGULAR CYCLE: Status: ACTIVE | Noted: 2021-03-03

## 2021-03-03 PROCEDURE — 90471 IMMUNIZATION ADMIN: CPT | Performed by: FAMILY MEDICINE

## 2021-03-03 PROCEDURE — 90715 TDAP VACCINE 7 YRS/> IM: CPT | Performed by: FAMILY MEDICINE

## 2021-03-03 PROCEDURE — 99396 PREV VISIT EST AGE 40-64: CPT | Performed by: FAMILY MEDICINE

## 2021-03-03 PROCEDURE — 99213 OFFICE O/P EST LOW 20 MIN: CPT | Performed by: FAMILY MEDICINE

## 2021-03-03 RX ORDER — RABEPRAZOLE SODIUM 20 MG/1
20 TABLET, DELAYED RELEASE ORAL DAILY PRN
Qty: 90 TABLET | Refills: 4 | Status: SHIPPED | OUTPATIENT
Start: 2021-03-03 | End: 2022-11-07 | Stop reason: SDUPTHER

## 2021-03-03 NOTE — PROGRESS NOTES
Chief Complaint  Annual Exam and Hypertension    Subjective     CC  Problem List  Visit Diagnosis   Encounters  Notes  Medications  Labs  Result Review Imaging  Media    Giovanni Shin presents to [facility] for an annual exam. The patient is here: for coordination of medical care to discuss health maintenance and disease prevention to follow up on multiple medical problems. Overall has: moderate activity with work/home activities, exercises 2 - 3 times per week, good appetite, feels well with minor complaints, decreased energy level and is sleeping poorly. Nutrition: inappropriate diet. Last tetanus shot was 2007.     Giovanni was seen by Dr. Lozano for heavy bleeding. She had a mirena inserted on 01/21/2021. She is suppose to follow up as needed.    Hypertension  This is a chronic problem. The current episode started more than 1 year ago. Pertinent negatives include no chest pain, palpitations or shortness of breath. Risk factors for coronary artery disease include family history and obesity. Current antihypertension treatment includes ACE inhibitors. The current treatment provides significant improvement.       Review of Systems   Constitutional: Negative for activity change, appetite change, fatigue, fever, unexpected weight gain and unexpected weight loss.   HENT: Negative for congestion, ear pain, hearing loss, rhinorrhea, sinus pressure, sore throat, swollen glands, trouble swallowing and voice change.    Eyes: Negative for visual disturbance.   Respiratory: Negative for apnea, cough, shortness of breath and wheezing.    Cardiovascular: Negative for chest pain and palpitations.   Gastrointestinal: Negative for abdominal pain, blood in stool, constipation, diarrhea, nausea, vomiting and indigestion.   Endocrine: Negative for cold intolerance, heat intolerance, polydipsia and polyuria.   Genitourinary: Negative for breast discharge, breast lump, breast pain, dysuria,  "flank pain, frequency, pelvic pain and vaginal bleeding.   Musculoskeletal: Negative for arthralgias, joint swelling and myalgias.   Skin: Negative for rash, skin lesions and bruise.   Allergic/Immunologic: Negative for environmental allergies and immunocompromised state.   Neurological: Negative for dizziness, syncope, weakness, numbness, headache and memory problem.   Hematological: Negative for adenopathy. Does not bruise/bleed easily.   Psychiatric/Behavioral: Negative for suicidal ideas and depressed mood. The patient is not nervous/anxious.         Objective   Vital Signs:   /70 (BP Location: Right arm, Patient Position: Sitting, Cuff Size: Large Adult)   Pulse 98   Temp 97.1 °F (36.2 °C) (Temporal)   Resp 16   Ht 167.6 cm (66\")   Wt 102 kg (224 lb 9.6 oz)   SpO2 98% Comment: Room air  BMI 36.25 kg/m²     Physical Exam  Constitutional:       General: She is not in acute distress.     Appearance: She is well-developed.   HENT:      Head: Normocephalic. Hair is normal.      Right Ear: Tympanic membrane and external ear normal.      Left Ear: Tympanic membrane and external ear normal.      Nose: Nose normal. No mucosal edema.      Mouth/Throat:      Pharynx: Uvula midline.   Eyes:      General:         Right eye: No discharge.         Left eye: No discharge.      Conjunctiva/sclera: Conjunctivae normal.      Pupils: Pupils are equal, round, and reactive to light.   Neck:      Thyroid: No thyromegaly.      Vascular: No JVD.   Cardiovascular:      Rate and Rhythm: Normal rate and regular rhythm.      Chest Wall: PMI is not displaced.      Pulses:           Carotid pulses are 2+ on the right side and 2+ on the left side.       Femoral pulses are 2+ on the right side and 2+ on the left side.       Dorsalis pedis pulses are 2+ on the right side and 2+ on the left side.      Heart sounds: Normal heart sounds. No murmur. No friction rub. No gallop.       Comments: Negative Homans' no edema  Pulmonary:      " Effort: Pulmonary effort is normal. No respiratory distress.      Breath sounds: Normal breath sounds. No decreased breath sounds, wheezing, rhonchi or rales.   Chest:      Breasts: Breasts are symmetrical.         Right: No inverted nipple, mass, nipple discharge, skin change or tenderness.         Left: No inverted nipple, mass, nipple discharge, skin change or tenderness.   Abdominal:      General: Bowel sounds are normal. There is no distension or abdominal bruit.      Palpations: Abdomen is soft. There is no mass.      Tenderness: There is no abdominal tenderness.   Musculoskeletal:         General: No tenderness or deformity. Normal range of motion.      Cervical back: Normal range of motion and neck supple. No muscular tenderness.   Lymphadenopathy:      Cervical: No cervical adenopathy.      Upper Body:      Right upper body: No supraclavicular adenopathy.      Left upper body: No supraclavicular adenopathy.   Skin:     General: Skin is warm and dry.      Findings: No ecchymosis, erythema, lesion or rash.   Neurological:      Mental Status: She is alert and oriented to person, place, and time.      Cranial Nerves: No cranial nerve deficit.      Sensory: No sensory deficit.      Motor: No abnormal muscle tone.      Coordination: Coordination normal.      Deep Tendon Reflexes: Reflexes are normal and symmetric. Reflexes normal.   Psychiatric:         Speech: Speech normal.         Behavior: Behavior normal.         Thought Content: Thought content normal. Thought content does not include suicidal ideation.         Cognition and Memory: She does not exhibit impaired recent memory or impaired remote memory.         Judgment: Judgment normal. Judgment is not impulsive.        Result Review :Labs  Result Review  Imaging  Med Tab  Media                 Assessment and Plan CC Problem List  Visit Diagnosis  ROS  Review (Popup)  Health Maintenance  Quality  BestPractice  Medications  SmartSets  SnapShot  Encounters  Media  Problem List Items Addressed This Visit        High    Heterozygous factor V Leiden mutation (CMS/HCC)    Overview     Father and brother also positive.  She is followed by hemaatology, she has hx of PE, she remain on Lovenox which maybe contributing to her heavy menses.   Followed by [facility]  She is on Xerelto life long         Hypertension, benign    Overview     Controlled she is compliant         Current Assessment & Plan     Hypertension is improving with treatment.  Continue current treatment regimen.  Dietary sodium restriction.  Weight loss.  Regular aerobic exercise.  Blood pressure will be reassessed in 3 months.         Relevant Orders    CBC & Differential (Completed)    Comprehensive Metabolic Panel (Completed)    TSH (Completed)       Medium    Gastroesophageal reflux disease without esophagitis    Overview     REsume meds  Possible etiology of sxs  GI referral if sxs continue.         Relevant Medications    RABEprazole (ACIPHEX) 20 MG EC tablet       Low    Allergic rhinitis due to pollen    Overview     Giovanni  requested  refill on doxycycline  continue to have   sore throat  head  congestion  sinus  pressure  non productive  cough  stable         Arthralgia of multiple joints    Overview     She has been seen by [facility], She reports the diagnosis of Fibromyalga.   She is to make a followup  Her workup with lab and xray were negative  She is off narotics         History of DVT (deep vein thrombosis)    Overview     LLE         History of pulmonary embolism    Overview     06/2017, Followed with Hematology, Factor V deficiency  She has had no recurrence on anti coagulation.           Lumbar disc disease with radiculopathy    Overview     Stable in pain medicine follow up  She is encouraged to use pain meds as sparingly as possible.         Overweight (BMI 25.0-29.9)    Overview     Diet exercise and wt loss encouraged. Prediabetes understood. She has successfully  lost 10 lbs. She is encouraged to continue life style modifications.          History of tobacco use    Overview     1 pack per week, x 10 years            Unprioritized    Anxiety and depression    Overview     Sxs are improved on meds.          Encounter for annual general medical examination with abnormal findings in adult - Primary    Overview     Diet exercise sunscreen. Seat belts, breast self exams and general safety discussed.          Encounter for screening mammogram for malignant neoplasm of breast    Overview     Last mammogram 11/09/2017         Relevant Orders    Mammo Screening Digital Tomosynthesis Bilateral With CAD    Screening for cervical cancer    Overview     Pap smear 05/19/2020 WNL HPV negative         Asymptomatic menopausal state    Overview     No prior         Relevant Orders    DEXA Bone Density Axial    Colon cancer screening    Overview     Accepted GSI packet         Presence of 52 mg levonorgestrel-releasing intrauterine device (IUD)    Overview     For menorrhagia           Other Visit Diagnoses     Need for hepatitis C screening test        Relevant Orders    Hepatitis C antibody (Completed)    Screening for hyperlipidemia        Relevant Orders    Lipid Panel With / Chol / HDL Ratio (Completed)    Need for Tdap vaccination        Relevant Orders    Tdap Vaccine Greater Than or Equal To 8yo IM (Completed)          Follow Up Instructions Charge Capture  Follow-up Communications  Return in about 1 year (around 3/3/2022), or if symptoms worsen or fail to improve.  Patient was given instructions and counseling regarding her condition or for health maintenance advice. Please see specific information pulled into the AVS if appropriate.      Site care done- cleaned with alcohol swab, procedure tolerated well, dressing applied. Venipuncture was obtained after 1 time(s). 2 tubes were drawn. Needle gauge used was 22.

## 2021-03-04 LAB
ALBUMIN SERPL-MCNC: 4.4 G/DL (ref 3.8–4.9)
ALBUMIN/GLOB SERPL: 1.5 {RATIO} (ref 1.2–2.2)
ALP SERPL-CCNC: 99 IU/L (ref 39–117)
ALT SERPL-CCNC: 14 IU/L (ref 0–32)
AST SERPL-CCNC: 21 IU/L (ref 0–40)
BASOPHILS # BLD AUTO: 0 X10E3/UL (ref 0–0.2)
BASOPHILS NFR BLD AUTO: 1 %
BILIRUB SERPL-MCNC: 0.4 MG/DL (ref 0–1.2)
BUN SERPL-MCNC: 11 MG/DL (ref 6–24)
BUN/CREAT SERPL: 12 (ref 9–23)
CALCIUM SERPL-MCNC: 10.9 MG/DL (ref 8.7–10.2)
CHLORIDE SERPL-SCNC: 103 MMOL/L (ref 96–106)
CHOLEST SERPL-MCNC: 210 MG/DL (ref 100–199)
CHOLEST/HDLC SERPL: 3 RATIO (ref 0–4.4)
CO2 SERPL-SCNC: 22 MMOL/L (ref 20–29)
CREAT SERPL-MCNC: 0.93 MG/DL (ref 0.57–1)
EOSINOPHIL # BLD AUTO: 0.1 X10E3/UL (ref 0–0.4)
EOSINOPHIL NFR BLD AUTO: 1 %
ERYTHROCYTE [DISTWIDTH] IN BLOOD BY AUTOMATED COUNT: 13.2 % (ref 11.7–15.4)
GLOBULIN SER CALC-MCNC: 2.9 G/DL (ref 1.5–4.5)
GLUCOSE SERPL-MCNC: 108 MG/DL (ref 65–99)
HCT VFR BLD AUTO: 39.8 % (ref 34–46.6)
HCV AB S/CO SERPL IA: <0.1 S/CO RATIO (ref 0–0.9)
HDLC SERPL-MCNC: 71 MG/DL
HGB BLD-MCNC: 13.7 G/DL (ref 11.1–15.9)
IMM GRANULOCYTES # BLD AUTO: 0 X10E3/UL (ref 0–0.1)
IMM GRANULOCYTES NFR BLD AUTO: 0 %
LDLC SERPL CALC-MCNC: 116 MG/DL (ref 0–99)
LYMPHOCYTES # BLD AUTO: 2 X10E3/UL (ref 0.7–3.1)
LYMPHOCYTES NFR BLD AUTO: 25 %
MCH RBC QN AUTO: 31.2 PG (ref 26.6–33)
MCHC RBC AUTO-ENTMCNC: 34.4 G/DL (ref 31.5–35.7)
MCV RBC AUTO: 91 FL (ref 79–97)
MONOCYTES # BLD AUTO: 0.5 X10E3/UL (ref 0.1–0.9)
MONOCYTES NFR BLD AUTO: 6 %
NEUTROPHILS # BLD AUTO: 5.2 X10E3/UL (ref 1.4–7)
NEUTROPHILS NFR BLD AUTO: 67 %
PLATELET # BLD AUTO: 251 X10E3/UL (ref 150–450)
POTASSIUM SERPL-SCNC: 4.5 MMOL/L (ref 3.5–5.2)
PROT SERPL-MCNC: 7.3 G/DL (ref 6–8.5)
RBC # BLD AUTO: 4.39 X10E6/UL (ref 3.77–5.28)
SODIUM SERPL-SCNC: 137 MMOL/L (ref 134–144)
TRIGL SERPL-MCNC: 131 MG/DL (ref 0–149)
TSH SERPL DL<=0.005 MIU/L-ACNC: 0.87 UIU/ML (ref 0.45–4.5)
VLDLC SERPL CALC-MCNC: 23 MG/DL (ref 5–40)
WBC # BLD AUTO: 7.8 X10E3/UL (ref 3.4–10.8)

## 2021-03-08 ENCOUNTER — OFFICE VISIT (OUTPATIENT)
Dept: PAIN MEDICINE | Facility: CLINIC | Age: 51
End: 2021-03-08

## 2021-03-08 VITALS
SYSTOLIC BLOOD PRESSURE: 130 MMHG | BODY MASS INDEX: 36 KG/M2 | HEART RATE: 82 BPM | DIASTOLIC BLOOD PRESSURE: 89 MMHG | HEIGHT: 66 IN | OXYGEN SATURATION: 97 % | TEMPERATURE: 97.2 F | WEIGHT: 224 LBS | RESPIRATION RATE: 16 BRPM

## 2021-03-08 DIAGNOSIS — M16.12 PRIMARY OSTEOARTHRITIS OF LEFT HIP: ICD-10-CM

## 2021-03-08 DIAGNOSIS — M19.012 PRIMARY OSTEOARTHRITIS OF LEFT SHOULDER: ICD-10-CM

## 2021-03-08 DIAGNOSIS — G89.29 CHRONIC BILATERAL LOW BACK PAIN WITH BILATERAL SCIATICA: Primary | ICD-10-CM

## 2021-03-08 DIAGNOSIS — M51.16 LUMBAR DISC DISEASE WITH RADICULOPATHY: ICD-10-CM

## 2021-03-08 DIAGNOSIS — M54.42 CHRONIC BILATERAL LOW BACK PAIN WITH BILATERAL SCIATICA: Primary | ICD-10-CM

## 2021-03-08 DIAGNOSIS — M25.50 ARTHRALGIA OF MULTIPLE JOINTS: ICD-10-CM

## 2021-03-08 DIAGNOSIS — M17.12 PRIMARY OSTEOARTHRITIS OF LEFT KNEE: ICD-10-CM

## 2021-03-08 DIAGNOSIS — M54.41 CHRONIC BILATERAL LOW BACK PAIN WITH BILATERAL SCIATICA: Primary | ICD-10-CM

## 2021-03-08 DIAGNOSIS — M79.7 FIBROMYALGIA: ICD-10-CM

## 2021-03-08 PROCEDURE — 99213 OFFICE O/P EST LOW 20 MIN: CPT | Performed by: PHYSICAL MEDICINE & REHABILITATION

## 2021-03-08 PROCEDURE — G0463 HOSPITAL OUTPT CLINIC VISIT: HCPCS | Performed by: PHYSICAL MEDICINE & REHABILITATION

## 2021-03-08 RX ORDER — HYDROCODONE BITARTRATE AND ACETAMINOPHEN 7.5; 325 MG/1; MG/1
1 TABLET ORAL EVERY 6 HOURS PRN
Qty: 120 TABLET | Refills: 0 | Status: SHIPPED | OUTPATIENT
Start: 2021-03-08 | End: 2021-06-02 | Stop reason: SDUPTHER

## 2021-03-08 RX ORDER — CELECOXIB 200 MG/1
200 CAPSULE ORAL DAILY
COMMUNITY
Start: 2021-03-05 | End: 2021-08-31

## 2021-03-08 NOTE — PROGRESS NOTES
Subjective   Giovanni Shin is a 51 y.o. female.     Chronic LBP radiating to b/l hips, pain in b/l hips and multiple joints, 8/10 at worst, 5/10 at best, always present, varies, began around 2010, worsening, aching, pressure, worse with all activity and inactivity both, interfers with ADLs, sleep, activity, failed chiropractor, PT, LESIs, RFA, was taking Norco 7.5mg BID prn in past. X-ray L hip with mild OA. 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 No FH of substance abuse.       The following portions of the patient's history were reviewed and updated as appropriate: allergies, current medications, past family history, past medical history, past social history, past surgical history and problem list.    Review of Systems   Constitutional: Positive for fatigue. Negative for chills and fever.   HENT: Negative for hearing loss and trouble swallowing.    Eyes: Positive for visual disturbance.   Respiratory: Positive for shortness of breath.    Cardiovascular: Positive for chest pain.   Gastrointestinal: Negative for abdominal pain, constipation, diarrhea, nausea and vomiting.   Genitourinary: Negative for urinary incontinence.   Musculoskeletal: Positive for arthralgias, back pain and joint swelling. Negative for myalgias and neck pain.   Neurological: Positive for headache. Negative for dizziness, weakness and numbness.       Objective   Physical Exam   Constitutional: She is oriented to person, place, and time. She appears well-developed and well-nourished.   HENT:   Head: Normocephalic and atraumatic.   Eyes: Pupils are equal, round, and reactive to light.   Cardiovascular: Normal rate, regular rhythm and normal heart sounds.   Pulmonary/Chest: Breath sounds normal.   Abdominal: Soft. Bowel sounds are normal.  She exhibits no distension. There is no abdominal tenderness.   Neurological: She is alert and oriented to person, place, and time. She has normal reflexes. She displays normal reflexes. No sensory deficit.   Psychiatric: Her behavior is normal. Thought content normal.         Assessment/Plan   Diagnoses and all orders for this visit:    1. Chronic bilateral low back pain with bilateral sciatica (Primary)    2. Lumbar disc disease with radiculopathy    3. Primary osteoarthritis of left hip    4. Primary osteoarthritis of left knee    5. Primary osteoarthritis of left shoulder    6. Arthralgia of multiple joints    7. Fibromyalgia        UDS in order 9/9/20.  Treatment plan will consist of continuing current medication as long as it remains effective and is necessary, while evaluating patient at each visit and determining if the medication can be lowered or discontinued, while also using nonopioid therapies to reduce reliance on opioids.  Began Belbuca 150mcg/hr BID, helped but made her drowsy, also with 75mcg/hr BID.  Did OK on Norco 5mg but needs time release meds with making her too hyper. Began Hysingla 20mg qdaily, still too drowsy.  Restarted Norco 5mg QID prn, increased to 7.5mg QID prn.   Began Pennsaid BID prn, works very well.  Cont Cymbalta. Cannot tolerate Gabapentin, Lyrica. No TCAs with Cymbalta.  Began Celebrex 200mg qdaily prn instead of Ibuprofen, less risk of GI bleed on Lovenox.  Discussed Topamax is better suited for lancinating neuropathic pain, not her pain.  Failed REAL Vazquez.  RTC 3 months for f/u.      INSPECT REPORT     As part of the patient's treatment plan, I am prescribing controlled substances. The patient has been made aware of appropriate use of such medications, including potential risk of somnolence, limited ability to drive and/or work safely, and the potential for dependence or overdose. It has also bee made clear that these medications are for use by this patient only, without  concomitant use of alcohol or other substances unless prescribed.      Patient has completed prescribing agreement detailing terms of continued prescribing of controlled substances, including monitoring INSPECT reports, urine drug screening, and pill counts if necessary. The patient is aware that inappropriate use will results in cessation of prescribing such medications.     INSPECT report has been reviewed and scanned into the patient's chart.     As the clinician, I personally reviewed the INSPECT while the patient was in the office today.     History and physical exam exhibit continued safe and appropriate use of controlled substances.

## 2021-03-10 ENCOUNTER — HOSPITAL ENCOUNTER (OUTPATIENT)
Dept: BONE DENSITY | Facility: HOSPITAL | Age: 51
Discharge: HOME | End: 2021-03-10

## 2021-03-10 ENCOUNTER — HOSPITAL ENCOUNTER (OUTPATIENT)
Dept: MAMMOGRAPHY | Facility: HOSPITAL | Age: 51
Discharge: HOME | End: 2021-03-10

## 2021-03-10 DIAGNOSIS — Z78.0 ASYMPTOMATIC MENOPAUSAL STATE: ICD-10-CM

## 2021-03-10 PROCEDURE — 77063 BREAST TOMOSYNTHESIS BI: CPT

## 2021-03-10 PROCEDURE — 77067 SCR MAMMO BI INCL CAD: CPT

## 2021-03-10 PROCEDURE — 77080 DXA BONE DENSITY AXIAL: CPT

## 2021-06-02 ENCOUNTER — OFFICE VISIT (OUTPATIENT)
Dept: PAIN MEDICINE | Facility: CLINIC | Age: 51
End: 2021-06-02

## 2021-06-02 VITALS
SYSTOLIC BLOOD PRESSURE: 147 MMHG | WEIGHT: 215 LBS | HEIGHT: 66 IN | DIASTOLIC BLOOD PRESSURE: 101 MMHG | BODY MASS INDEX: 34.55 KG/M2 | RESPIRATION RATE: 16 BRPM | OXYGEN SATURATION: 96 % | HEART RATE: 93 BPM | TEMPERATURE: 97.1 F

## 2021-06-02 DIAGNOSIS — M51.16 LUMBAR DISC DISEASE WITH RADICULOPATHY: ICD-10-CM

## 2021-06-02 DIAGNOSIS — M79.7 FIBROMYALGIA: ICD-10-CM

## 2021-06-02 DIAGNOSIS — M54.41 CHRONIC BILATERAL LOW BACK PAIN WITH BILATERAL SCIATICA: Primary | ICD-10-CM

## 2021-06-02 DIAGNOSIS — M25.50 ARTHRALGIA OF MULTIPLE JOINTS: ICD-10-CM

## 2021-06-02 DIAGNOSIS — M17.12 PRIMARY OSTEOARTHRITIS OF LEFT KNEE: ICD-10-CM

## 2021-06-02 DIAGNOSIS — M16.12 PRIMARY OSTEOARTHRITIS OF LEFT HIP: ICD-10-CM

## 2021-06-02 DIAGNOSIS — M19.012 PRIMARY OSTEOARTHRITIS OF LEFT SHOULDER: ICD-10-CM

## 2021-06-02 DIAGNOSIS — Z79.899 ENCOUNTER FOR LONG-TERM (CURRENT) USE OF OTHER MEDICATIONS: ICD-10-CM

## 2021-06-02 DIAGNOSIS — G89.29 CHRONIC BILATERAL LOW BACK PAIN WITH BILATERAL SCIATICA: Primary | ICD-10-CM

## 2021-06-02 DIAGNOSIS — M54.42 CHRONIC BILATERAL LOW BACK PAIN WITH BILATERAL SCIATICA: Primary | ICD-10-CM

## 2021-06-02 PROCEDURE — 99213 OFFICE O/P EST LOW 20 MIN: CPT | Performed by: PHYSICAL MEDICINE & REHABILITATION

## 2021-06-02 PROCEDURE — G0463 HOSPITAL OUTPT CLINIC VISIT: HCPCS | Performed by: PHYSICAL MEDICINE & REHABILITATION

## 2021-06-02 RX ORDER — HYDROCODONE BITARTRATE AND ACETAMINOPHEN 7.5; 325 MG/1; MG/1
1 TABLET ORAL EVERY 6 HOURS PRN
Qty: 120 TABLET | Refills: 0 | Status: SHIPPED | OUTPATIENT
Start: 2021-06-02 | End: 2021-06-25 | Stop reason: HOSPADM

## 2021-06-02 RX ORDER — HYDROCODONE BITARTRATE AND ACETAMINOPHEN 7.5; 325 MG/1; MG/1
1 TABLET ORAL EVERY 6 HOURS PRN
Qty: 120 TABLET | Refills: 0 | Status: SHIPPED | OUTPATIENT
Start: 2021-06-02 | End: 2021-08-23 | Stop reason: SDUPTHER

## 2021-06-02 NOTE — PROGRESS NOTES
Subjective   Giovanni Shin is a 51 y.o. female.     Chronic LBP radiating to b/l hips, pain in b/l hips and multiple joints, 8/10 at worst, 5/10 at best, always present, varies, began around 2010, worsening, aching, pressure, worse with all activity and inactivity both, interfers with ADLs, sleep, activity, failed chiropractor, PT, LESIs, RFA, was taking Norco 7.5mg BID prn in past. X-ray L hip with mild OA. 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 No FH of substance abuse.       The following portions of the patient's history were reviewed and updated as appropriate: allergies, current medications, past family history, past medical history, past social history, past surgical history and problem list.    Review of Systems   Constitutional: Positive for fatigue. Negative for chills and fever.   HENT: Negative for hearing loss and trouble swallowing.    Eyes: Positive for visual disturbance.   Respiratory: Positive for shortness of breath.    Cardiovascular: Positive for chest pain.   Gastrointestinal: Negative for abdominal pain, constipation, diarrhea, nausea and vomiting.   Genitourinary: Negative for urinary incontinence.   Musculoskeletal: Positive for arthralgias, back pain and joint swelling. Negative for myalgias and neck pain.   Neurological: Positive for headache. Negative for dizziness, weakness and numbness.       Objective   Physical Exam   Constitutional: She is oriented to person, place, and time. She appears well-developed and well-nourished.   HENT:   Head: Normocephalic and atraumatic.   Eyes: Pupils are equal, round, and reactive to light.   Cardiovascular: Normal rate, regular rhythm and normal heart sounds.   Pulmonary/Chest: Breath sounds normal.   Abdominal: Soft. Bowel sounds are normal.  She exhibits no distension. There is no abdominal tenderness.   Neurological: She is alert and oriented to person, place, and time. She has normal reflexes. She displays normal reflexes. No sensory deficit.   Psychiatric: Her behavior is normal. Thought content normal.         Assessment/Plan   Diagnoses and all orders for this visit:    1. Chronic bilateral low back pain with bilateral sciatica (Primary)    2. Lumbar disc disease with radiculopathy    3. Arthralgia of multiple joints    4. Fibromyalgia    5. Primary osteoarthritis of left hip    6. Primary osteoarthritis of left knee    7. Primary osteoarthritis of left shoulder        UDS in order 9/9/20.  Treatment plan will consist of continuing current medication as long as it remains effective and is necessary, while evaluating patient at each visit and determining if the medication can be lowered or discontinued, while also using nonopioid therapies to reduce reliance on opioids.  Began Belbuca 150mcg/hr BID, helped but made her drowsy, also with 75mcg/hr BID.  Did OK on Norco 5mg but needs time release meds with making her too hyper. Began Hysingla 20mg qdaily, still too drowsy.  Restarted Norco 5mg QID prn, increased to 7.5mg QID prn.   Began Pennsaid BID prn, works very well.  Cont Cymbalta. Cannot tolerate Gabapentin, Lyrica. No TCAs with Cymbalta.  Began Celebrex 200mg qdaily prn instead of Ibuprofen, less risk of GI bleed on Lovenox.  Discussed Topamax is better suited for lancinating neuropathic pain, not her pain.  Failed REAL Vazquez.  RTC 3 months for f/u.      INSPECT REPORT     As part of the patient's treatment plan, I am prescribing controlled substances. The patient has been made aware of appropriate use of such medications, including potential risk of somnolence, limited ability to drive and/or work safely, and the potential for dependence or overdose. It has also bee made clear that these medications are for use by this patient only, without  concomitant use of alcohol or other substances unless prescribed.      Patient has completed prescribing agreement detailing terms of continued prescribing of controlled substances, including monitoring INSPECT reports, urine drug screening, and pill counts if necessary. The patient is aware that inappropriate use will results in cessation of prescribing such medications.     INSPECT report has been reviewed and scanned into the patient's chart.     As the clinician, I personally reviewed the INSPECT while the patient was in the office today.     History and physical exam exhibit continued safe and appropriate use of controlled substances.

## 2021-06-24 NOTE — H&P
GI CONSULT  NOTE:    Referring Provider:    Kath Eisenberg MD  [unfilled]    Chief complaint: <principal problem not specified> screening for colon cancer    History of present illness:      Giovanni Shin is a 51 y.o. female who presents today for Procedure(s):  COLONOSCOPY for the indications listed below.     The updated Patient Profile was reviewed prior to the procedure, in conjunction with the Physical Exam, including medical conditions, surgical procedures, medications, allergies, family history and social history.     Pre-operatively, I reviewed the indication(s) for the procedure, the risks of the procedure [including but not limited to: unexpected bleeding possibly requiring hospitalization and/or unplanned repeat procedures, perforation possibly requiring surgical treatment, missed lesions and complications of sedation/MAC (also explained by anesthesia staff)].     I have evaluated the patient for risks associated with the planned anesthesia and the procedure to be performed and find the patient an acceptable candidate for IV sedation.    Multiple opportunities were provided for any questions or concerns, and all questions were answered satisfactorily before any anesthesia was administered. We will proceed with the planned procedure.    Past Medical History:  Past Medical History:   Diagnosis Date   • Acute bronchitis    • Acute non-recurrent maxillary sinusitis     Impression: She was prescribed Cipro to treat her symptoms. Increase fluids. Tylenol/motrin for pain or fever. Medication and medication adverse effects discussed. Follow-up 5-7 days for reevaluation if not improved or sooner if needed.   • Acute pharyngitis    • Acute serous otitis media    • Age-related cataract of both eyes    • Annual visit for general adult medical examination with abnormal findings    • Anxiety and depression    • Arthralgia of multiple joints     Impression: She has been seen by [facility], She  reports the diagnosis of Fibromyalga. She is to make a followup Her workup with lab and xray were negative She is off narotics   • Atypical chest pain     Impression: REsolved.n Etiology uncetain Negative exam Negative CT with PE protochol ER 02/25/2018 Negative EKG and enzymes. Moderate life stresses She declines meds as this time. Diet low carbl low salt, regular exercise and follow She will notify us of sxs continue. GXT should that occur.   • Breast mass, left     Impression: 6mth follow up   • Candidiasis of mouth     Impression: aggravated by nasal steroids which she will hold   • Chronic pain syndrome     Impression: possible fibromyalgia, though Rheumatology has not concerned.   • Contact dermatitis or eczema     Impression: Left neck   • Cough     Impression: post influenza   • Depression     Impression: Moderate family and life stresses She has a good support network.   • Encounter for screening mammogram for malignant neoplasm of breast     Impression: Scheduled for 11/09/2017   • Gastroesophageal reflux disease without esophagitis     Impression: REsume meds Possible etiology of sxs GI referral if sxs continue.   • Headache     Impression: negative exam etiology uncertain, meds as above.   • Hemangioma     Impression: right cheek at the nasal fold   • Heterozygous factor V Leiden mutation (CMS/HCC)     Impression: Father and brother also positive. She is followed by [facility], she has hx of PE, she remain on Lovenox   • History of DVT (deep vein thrombosis)     Impression: LLE   • History of pulmonary embolism     Impression: 06/2017, remains concerned but this is unlikely to be contributing to her present sxs HEr CT at ER was negative. Now x 5 mos of anticoagulation, managed by Hematology, [place]   • History of tobacco use    • Hypertension, benign     Impression: Controlled   • Low back pain    • Lumbar disc disease with radiculopathy     Impression: Stable in pain medicine follow up She is encouraged to use  pain meds as sparingly as possible.   • Lumbar disc disorder with myelopathy    • Malaise and fatigue    • Menopause syndrome    • Multiple nevi    • Overweight (BMI 25.0-29.9)    • Positive depression screening    • Primary osteoarthritis of left knee    • Pruritic disorder    • Pruritus    • Reactive airways dysfunction syndrome (CMS/HCC)    • Routine general medical examination at a health care facility    • Screening for cervical cancer     Impression: Pap smear 2016. WNL HPV negative   • Screening for depression    • Screening for hyperlipidemia    • Seasonal allergic rhinitis due to pollen     Impression: Giovanni requested refill on doxycycline continue to have sore throat head congestion sinus pressure non productive cough   • Stomatitis, viral    • Tobacco use disorder     Story: Stable, Non-Compliant with cessation because she not interested in stopping at present Goals developed ie she will attempt to decrease and not increase consumption. Assess, address, and avoid triggers. Follow up in 6 month for reevaluation of use. Care management needs are self addressed. Self-Management abilities addressed and patient is capable of managing his/her own disease   • Upper respiratory infection     Impression: Increase fluids. Tylenol/motrin for pain or fever. Medication and medication adverse effects discussed. Follow-up 5-7 days for reevaluation if not improved or sooner if needed.       Past Surgical History:  History reviewed. No pertinent surgical history.    Social History:  Social History     Tobacco Use   • Smoking status: Former Smoker     Packs/day: 0.05     Years: 11.00     Pack years: 0.55     Types: Cigarettes     Start date:      Quit date: 2017     Years since quittin.4   • Smokeless tobacco: Never Used   Vaping Use   • Vaping Use: Never used   Substance Use Topics   • Alcohol use: Yes     Comment: rarely   • Drug use: No       Family History:  Family History   Problem Relation Age of Onset  "  • Hyperlipidemia Mother    • Hypertension Mother    • Hypertension Father    • Factor V Leiden deficiency Father    • Factor V Leiden deficiency Brother    • Colon cancer Maternal Grandfather    • Heart disease Maternal Grandfather    • Down syndrome Maternal Cousin    • Dementia Maternal Grandmother    • Stroke Paternal Grandmother    • Stroke Paternal Grandfather        Medications:  No medications prior to admission.       Scheduled Meds:  Continuous Infusions:No current facility-administered medications for this encounter.    PRN Meds:.    ALLERGIES:  Belbuca [buprenorphine hcl], Gabapentin, Hysingla er [hydrocodone bitartrate er], and Lyrica [pregabalin]    ROS:  The following systems were reviewed and negative;   Constitution:  No fevers, chills, no unintentional weight loss  Skin: no rash, no jaundice  Eyes:  No blurry vision, no eye pain  HENT:  No change in hearing or smell  Resp:  No dyspnea or cough  CV:  No chest pain or palpitations  :  No dysuria, hematuria  Musculoskeletal:  No leg cramps or arthralgias  Neuro:  No tremor, no numbness  Psych:  No depression or confusion    Objective     Vital Signs:   Vitals:    06/17/21 1624   Weight: 97.5 kg (215 lb)   Height: 167.6 cm (66\")       Physical Exam:       General Appearance:    Awake and alert, in no acute distress   Head:    Normocephalic, without obvious abnormality, atraumatic   Throat:   No oral lesions, no thrush, oral mucosa moist   Lungs:     respirations regular, even and unlabored   Skin:   No rash, no jaundice       Results Review:  Lab Results (last 24 hours)     ** No results found for the last 24 hours. **          Imaging Results (Last 24 Hours)     ** No results found for the last 24 hours. **           I reviewed the patient's labs and imaging.    ASSESSMENT AND PLAN:      Active Problems:    Screening for colon cancer       Procedure(s):  COLONOSCOPY      I discussed the patients findings and my recommendations with the " patient.    Lance Mccarty MD  06/24/21  17:49 EDT

## 2021-06-25 ENCOUNTER — ON CAMPUS - OUTPATIENT (OUTPATIENT)
Dept: URBAN - METROPOLITAN AREA HOSPITAL 85 | Facility: HOSPITAL | Age: 51
End: 2021-06-25
Payer: COMMERCIAL

## 2021-06-25 ENCOUNTER — HOSPITAL ENCOUNTER (OUTPATIENT)
Facility: HOSPITAL | Age: 51
Setting detail: HOSPITAL OUTPATIENT SURGERY
Discharge: HOME | End: 2021-06-25
Attending: INTERNAL MEDICINE | Admitting: INTERNAL MEDICINE

## 2021-06-25 ENCOUNTER — ANESTHESIA (OUTPATIENT)
Dept: GASTROENTEROLOGY | Facility: HOSPITAL | Age: 51
End: 2021-06-25

## 2021-06-25 ENCOUNTER — ANESTHESIA EVENT (OUTPATIENT)
Dept: GASTROENTEROLOGY | Facility: HOSPITAL | Age: 51
End: 2021-06-25

## 2021-06-25 VITALS
DIASTOLIC BLOOD PRESSURE: 84 MMHG | HEIGHT: 66 IN | WEIGHT: 220.68 LBS | SYSTOLIC BLOOD PRESSURE: 133 MMHG | RESPIRATION RATE: 11 BRPM | OXYGEN SATURATION: 99 % | TEMPERATURE: 98.4 F | BODY MASS INDEX: 35.47 KG/M2 | HEART RATE: 73 BPM

## 2021-06-25 DIAGNOSIS — Z12.11 ENCOUNTER FOR SCREENING FOR MALIGNANT NEOPLASM OF COLON: ICD-10-CM

## 2021-06-25 DIAGNOSIS — K63.5 POLYP OF COLON: ICD-10-CM

## 2021-06-25 DIAGNOSIS — Z12.11 SCREEN FOR COLON CANCER: ICD-10-CM

## 2021-06-25 PROCEDURE — 88305 TISSUE EXAM BY PATHOLOGIST: CPT | Performed by: INTERNAL MEDICINE

## 2021-06-25 PROCEDURE — 45385 COLONOSCOPY W/LESION REMOVAL: CPT | Mod: 33 | Performed by: INTERNAL MEDICINE

## 2021-06-25 PROCEDURE — 25010000002 PROPOFOL 10 MG/ML EMULSION: Performed by: ANESTHESIOLOGY

## 2021-06-25 RX ORDER — SODIUM CHLORIDE 9 MG/ML
INJECTION, SOLUTION INTRAVENOUS CONTINUOUS PRN
Status: DISCONTINUED | OUTPATIENT
Start: 2021-06-25 | End: 2021-06-25 | Stop reason: SURG

## 2021-06-25 RX ORDER — PROPOFOL 10 MG/ML
VIAL (ML) INTRAVENOUS AS NEEDED
Status: DISCONTINUED | OUTPATIENT
Start: 2021-06-25 | End: 2021-06-25 | Stop reason: SURG

## 2021-06-25 RX ADMIN — PROPOFOL 50 MG: 10 INJECTION, EMULSION INTRAVENOUS at 10:22

## 2021-06-25 RX ADMIN — SODIUM CHLORIDE: 0.9 INJECTION, SOLUTION INTRAVENOUS at 10:15

## 2021-06-25 RX ADMIN — PROPOFOL 50 MG: 10 INJECTION, EMULSION INTRAVENOUS at 10:35

## 2021-06-25 RX ADMIN — PROPOFOL 100 MG: 10 INJECTION, EMULSION INTRAVENOUS at 10:29

## 2021-06-25 RX ADMIN — PROPOFOL 100 MG: 10 INJECTION, EMULSION INTRAVENOUS at 10:18

## 2021-06-25 NOTE — ANESTHESIA PREPROCEDURE EVALUATION
Anesthesia Evaluation     NPO Solid Status: > 8 hours  NPO Liquid Status: > 8 hours           Airway   Mallampati: II  TM distance: >3 FB  No difficulty expected  Dental - normal exam     Pulmonary    (+) recent URI,   Cardiovascular     PT is on anticoagulation therapy    (+) hypertension,       Neuro/Psych  (+) headaches, numbness, psychiatric history,     GI/Hepatic/Renal/Endo    (+)  GERD,      Musculoskeletal     Abdominal    Substance History      OB/GYN          Other   arthritis,                    Anesthesia Plan    ASA 3     MAC     intravenous induction     Anesthetic plan, all risks, benefits, and alternatives have been provided, discussed and informed consent has been obtained with: patient.

## 2021-06-25 NOTE — ANESTHESIA POSTPROCEDURE EVALUATION
Patient: Giovanni Shin    Procedure Summary     Date: 06/25/21 Room / Location: [facility] ENDOSCOPY 1 / [facility] ENDOSCOPY    Anesthesia Start: 1015 Anesthesia Stop: 1045    Procedure: COLONOSCOPY with polypectomy (N/A ) Diagnosis:       Screen for colon cancer      (Screen for colon cancer [Z12.11])    Surgeons: Lance Mccarty MD Provider: Irving Lugo MD    Anesthesia Type: MAC ASA Status: 3          Anesthesia Type: MAC    Vitals  Vitals Value Taken Time   /86 06/25/21 1102   Temp     Pulse 69 06/25/21 1102   Resp 10 06/25/21 1047   SpO2 96 % 06/25/21 1102   Vitals shown include unvalidated device data.        Post Anesthesia Care and Evaluation    Patient location during evaluation: PACU  Patient participation: complete - patient participated  Level of consciousness: awake  Pain scale: See nurse's notes for pain score.  Pain management: adequate  Airway patency: patent  Anesthetic complications: No anesthetic complications  PONV Status: none  Cardiovascular status: acceptable  Respiratory status: acceptable  Hydration status: acceptable    Comments: Patient seen and examined postoperatively; vital signs stable; SpO2 greater than or equal to 90%; cardiopulmonary status stable; nausea/vomiting adequately controlled; pain adequately controlled; no apparent anesthesia complications; patient discharged from anesthesia care when discharge criteria were met

## 2021-06-25 NOTE — OP NOTE
COLONOSCOPY Procedure Report    Patient Name:  Giovanni Shin  YOB: 1970    Date of Surgery:  6/25/2021     Pre-Op Diagnosis:  Screening for colon cancer    Post-Op Diagnosis:  Colon polyps x3    Procedure/CPT® Codes:  Colonoscopy with snare polypectomy    Staff:  Surgeon(s):  Lance Mccarty MD      Anesthesia: Monitored Anesthesia Care    Implants:    Nothing was implanted during the procedure    Specimen:        See below    Estimated blood loss: Minimal     Complications:  None    Description of Procedure:  Informed consent was obtained for the procedure, including sedation.  Risks of perforation, hemorrhage, adverse drug reaction and aspiration were discussed.  The patient was brought into the endoscopy suite. Continuous cardiopulmonary monitoring was performed.  The patient was placed in the left lateral decubitus position. After adequate sedation was attained, the digital rectal exam was performed which was normal.  Subsequently, the Olympus colonoscope was inserted into the patient's rectum and advanced to the level of the cecum and terminal ileum moderate difficulty due to looping.  The bowel prep was excellent.  Circumferential examination of the patient's colon was performed on scope withdrawal.  The cecum, ascending colon, and hepatic flexure were examined twice.  The transverse colon, splenic flexure, descending, sigmoid colon, and rectum were examined.  A retroflex exam was performed in the rectum.  The bowel was decompressed, the scope was withdrawn from the patient, and the patient tolerated the procedure well. There were no immediate post-operative complications.     Findings:   Terminal ileum: Normal mucosa distal 10 cm  Colon:   1, 3 mm diminutive polyp in transverse colon removed with cold snare single piece polypectomy  2, 2 to 3 mm diminutive polyps in sigmoid colon removed with cold snare single piece polypectomy retrieved.    Impression:  51-year-old female with  screening colonoscopy showing 3 diminutive polyps as described.    Recommendations:  Follow-up in pathology  Resume Xarelto 6/26/2021  If polyps adenomatous repeat colonoscopy in 5 years, otherwise 10    We appreciate the referral    Lance Mccarty MD     Date: 6/25/2021  Time: 10:45 EDT

## 2021-06-25 NOTE — DISCHARGE INSTRUCTIONS
A responsible adult should stay with you and you should rest quietly for the rest of the day.    Do not drink alcohol, drive, operate any heavy machinery or power tools or make any legal/important decisions for the next 24 hours.     Progress your diet as tolerated.  If you begin to experience severe pain, increased shortness of breath, racing heartbeat or a fever above 101 F, seek immediate medical attention.     Follow up with MD as instructed. Call office for results in 3 to 5 days if needed.    Follow-up in pathology  Resume Xarelto 6/26/2021  If polyps adenomatous repeat colonoscopy in 5 years, otherwise 10

## 2021-06-28 PROBLEM — K63.5 HYPERPLASTIC POLYP OF DESCENDING COLON: Status: ACTIVE | Noted: 2021-06-28

## 2021-06-28 LAB
LAB AP CASE REPORT: NORMAL
LAB AP DIAGNOSIS COMMENT: NORMAL
PATH REPORT.FINAL DX SPEC: NORMAL
PATH REPORT.GROSS SPEC: NORMAL

## 2021-07-13 ENCOUNTER — HOSPITAL ENCOUNTER (OUTPATIENT)
Dept: GENERAL RADIOLOGY | Facility: HOSPITAL | Age: 51
Discharge: HOME | End: 2021-07-13

## 2021-07-13 ENCOUNTER — OFFICE VISIT (OUTPATIENT)
Dept: FAMILY MEDICINE CLINIC | Facility: CLINIC | Age: 51
End: 2021-07-13

## 2021-07-13 ENCOUNTER — PATIENT MESSAGE (OUTPATIENT)
Dept: FAMILY MEDICINE CLINIC | Facility: CLINIC | Age: 51
End: 2021-07-13

## 2021-07-13 VITALS
WEIGHT: 222.8 LBS | SYSTOLIC BLOOD PRESSURE: 120 MMHG | DIASTOLIC BLOOD PRESSURE: 80 MMHG | TEMPERATURE: 97.8 F | OXYGEN SATURATION: 98 % | BODY MASS INDEX: 35.81 KG/M2 | HEART RATE: 102 BPM | HEIGHT: 66 IN | RESPIRATION RATE: 18 BRPM

## 2021-07-13 DIAGNOSIS — E66.01 CLASS 2 SEVERE OBESITY DUE TO EXCESS CALORIES WITH SERIOUS COMORBIDITY AND BODY MASS INDEX (BMI) OF 35.0 TO 35.9 IN ADULT (HCC): ICD-10-CM

## 2021-07-13 DIAGNOSIS — M54.40 BILATERAL LOW BACK PAIN WITH SCIATICA, SCIATICA LATERALITY UNSPECIFIED, UNSPECIFIED CHRONICITY: ICD-10-CM

## 2021-07-13 DIAGNOSIS — Z87.891 HISTORY OF TOBACCO USE: ICD-10-CM

## 2021-07-13 DIAGNOSIS — M25.551 BILATERAL HIP PAIN: Primary | ICD-10-CM

## 2021-07-13 DIAGNOSIS — J30.1 SEASONAL ALLERGIC RHINITIS DUE TO POLLEN: ICD-10-CM

## 2021-07-13 DIAGNOSIS — M25.552 BILATERAL HIP PAIN: Primary | ICD-10-CM

## 2021-07-13 DIAGNOSIS — M51.16 LUMBAR DISC DISEASE WITH RADICULOPATHY: ICD-10-CM

## 2021-07-13 DIAGNOSIS — I10 HYPERTENSION, BENIGN: ICD-10-CM

## 2021-07-13 PROBLEM — E66.812 CLASS 2 SEVERE OBESITY DUE TO EXCESS CALORIES WITH SERIOUS COMORBIDITY AND BODY MASS INDEX (BMI) OF 35.0 TO 35.9 IN ADULT: Status: ACTIVE | Noted: 2019-09-10

## 2021-07-13 PROCEDURE — 72110 X-RAY EXAM L-2 SPINE 4/>VWS: CPT

## 2021-07-13 PROCEDURE — 73521 X-RAY EXAM HIPS BI 2 VIEWS: CPT

## 2021-07-13 PROCEDURE — 99213 OFFICE O/P EST LOW 20 MIN: CPT | Performed by: FAMILY MEDICINE

## 2021-07-13 RX ORDER — MONTELUKAST SODIUM 10 MG/1
10 TABLET ORAL DAILY
Qty: 90 TABLET | Refills: 3 | Status: SHIPPED | OUTPATIENT
Start: 2021-07-13 | End: 2021-12-15

## 2021-07-13 NOTE — PROGRESS NOTES
Chief Complaint  Hip Pain and Hypertension    Subjective     CC  Problem List  Visit Diagnosis   Encounters  Notes  Medications  Labs  Result Review Imaging  Media    Giovanni Shin presents to [facility] for   Hip Pain   The incident occurred more than 1 week ago. The pain is present in the left hip and right hip. The quality of the pain is described as aching. The pain is moderate. The pain has been constant since onset. Pertinent negatives include no inability to bear weight, numbness or tingling. She reports no foreign bodies present. The symptoms are aggravated by movement and weight bearing. She has tried NSAIDs, acetaminophen and rest (celebrex, cymbalta, hydrocodone) for the symptoms. The treatment provided no relief.   Hypertension  This is a chronic problem. The current episode started more than 1 year ago. The problem is controlled. Pertinent negatives include no chest pain, headaches, orthopnea, palpitations, peripheral edema, PND, shortness of breath or sweats. Risk factors for coronary artery disease include post-menopausal state and family history. Current antihypertension treatment includes angiotensin blockers and beta blockers. The current treatment provides significant improvement.       Review of Systems   Constitutional: Negative for fever, unexpected weight gain and unexpected weight loss.   HENT: Positive for congestion, rhinorrhea and sinus pressure.    Respiratory: Negative for shortness of breath and wheezing.    Cardiovascular: Negative for chest pain, palpitations, orthopnea, leg swelling and PND.   Gastrointestinal: Negative for abdominal pain, constipation and diarrhea.   Endocrine: Negative for cold intolerance, heat intolerance, polydipsia and polyuria.   Genitourinary: Negative for dysuria.   Musculoskeletal: Positive for arthralgias (bilateral hip pain and low back pain). Negative for myalgias.   Neurological: Negative for tingling,  "weakness and numbness.   Hematological: Negative for adenopathy. Does not bruise/bleed easily.   Psychiatric/Behavioral: Negative for suicidal ideas and depressed mood. The patient is not nervous/anxious.         Objective   Vital Signs:   /80 (BP Location: Right arm, Patient Position: Sitting, Cuff Size: Adult)   Pulse 102   Temp 97.8 °F (36.6 °C) (Temporal)   Resp 18   Ht 167.6 cm (66\")   Wt 101 kg (222 lb 12.8 oz)   SpO2 98% Comment: Room air  BMI 35.96 kg/m²     Physical Exam  Constitutional:       Appearance: She is obese.   Cardiovascular:      Rate and Rhythm: Normal rate and regular rhythm.      Heart sounds: No murmur heard.     Pulmonary:      Effort: Pulmonary effort is normal.      Breath sounds: Normal breath sounds.   Abdominal:      General: Abdomen is flat.      Palpations: Abdomen is soft.   Musculoskeletal:      Cervical back: Neck supple.      Right hip: No deformity, tenderness or bony tenderness. Decreased range of motion (mild). Normal strength.      Left hip: No deformity, tenderness or bony tenderness. Decreased range of motion ( mild). Decreased strength.   Lymphadenopathy:      Cervical: No cervical adenopathy.   Neurological:      Mental Status: She is alert.        Result Review :Labs  Result Review  Imaging  Med Tab  Media                 Assessment and Plan CC Problem List  Visit Diagnosis  ROS  Review (Popup)  Health Maintenance  Quality  BestPractice  Medications  SmartSets  SnapShot Encounters  Media  Problem List Items Addressed This Visit        Medium    Hypertension, benign - Primary    Overview     Controlled she is compliant            Low    Allergic rhinitis due to pollen    Overview     Giovanni  requested  refill on doxycycline  continue to have   sore throat  head  congestion  sinus  pressure  non productive  cough  stable               Follow Up Instructions Charge Capture  Follow-up Communications  No follow-ups on file.  Patient was given " instructions and counseling regarding her condition or for health maintenance advice. Please see specific information pulled into the AVS if appropriate.

## 2021-07-15 DIAGNOSIS — M51.16 LUMBAR DISC DISEASE WITH RADICULOPATHY: Primary | ICD-10-CM

## 2021-07-24 NOTE — ASSESSMENT & PLAN NOTE
Patient's (Body mass index is 35.96 kg/m².) indicates that they are obese (BMI >30) with obesity-related health conditions that include hypertension . Obesity is improving with lifestyle modifications. BMI is is above average; BMI management plan is completed. We discussed portion control and increasing exercise.

## 2021-08-03 ENCOUNTER — TREATMENT (OUTPATIENT)
Dept: PHYSICAL THERAPY | Facility: CLINIC | Age: 51
End: 2021-08-03

## 2021-08-03 DIAGNOSIS — R29.898 LEG WEAKNESS, BILATERAL: ICD-10-CM

## 2021-08-03 DIAGNOSIS — M54.16 RADICULOPATHY, LUMBAR REGION: Primary | ICD-10-CM

## 2021-08-03 DIAGNOSIS — M54.50 CHRONIC BILATERAL LOW BACK PAIN WITHOUT SCIATICA: ICD-10-CM

## 2021-08-03 DIAGNOSIS — G89.29 CHRONIC BILATERAL LOW BACK PAIN WITHOUT SCIATICA: ICD-10-CM

## 2021-08-03 PROCEDURE — 97140 MANUAL THERAPY 1/> REGIONS: CPT | Performed by: PHYSICAL THERAPIST

## 2021-08-03 PROCEDURE — 97162 PT EVAL MOD COMPLEX 30 MIN: CPT | Performed by: PHYSICAL THERAPIST

## 2021-08-03 PROCEDURE — 97014 ELECTRIC STIMULATION THERAPY: CPT | Performed by: PHYSICAL THERAPIST

## 2021-08-03 NOTE — PROGRESS NOTES
Physical Therapy Initial Evaluation and Plan of Care    Patient: Giovanni Shin   : 1970  Diagnosis/ICD-10 Code:  Bilateral low back pain without sciatica, unspecified chronicity [M54.5]  Referring practitioner: Kath Eisenberg MD  Date of Initial Visit: 8/3/2021  Today's Date: 8/3/2021  Patient seen for 1 sessions           Subjective Questionnaire: Oswestry: 33/50, indicating 66% impairment      Subjective Evaluation    History of Present Illness  Mechanism of injury: Pt with c/o chronic low back pain, epidurals 3 yr ago and then developed PE and had to stop epidurals. Pt reports worsening of back pain x5-6 mo. States pain has started radiating around into B groins and hip pain. Having weakness in B LE with she gets up from chairs and if she twists the wrong way. States legs just do not want to hold her when pain is bad. Has knee and ankle pain. Pt tries to walk everyday and walks through the pain and starts limping at times. Pain with sitting >30 min. Difficulty standing >30 min. Heat and ice both give relief. Work is very sedentary, desk work, driving, meetings. Difficulty sleeping.      Patient Occupation: , works from home Quality of life: good    Pain  Current pain ratin  At best pain ratin  At worst pain ratin  Location: low back, radiating to B groins  Quality: dull ache and radiating  Relieving factors: heat, change in position and ice  Aggravating factors: lifting, movement, standing, stairs, ambulation, prolonged positioning, sleeping, squatting and repetitive movement  Progression: worsening    Social Support  Lives with: spouse    Patient Goals  Patient goals for therapy: decreased pain, improved balance, increased motion, increased strength and independence with ADLs/IADLs             Objective          Postural Observations  Seated posture: fair  Standing posture: fair    Additional Postural Observation Details  Rounded shoulders, decreased lumbar lordosis.  Gait:  mild decrease in sarah.    Palpation   Left   Hypertonic in the lumbar paraspinals.   Tenderness of the iliopsoas, lumbar paraspinals and piriformis.     Right   Hypertonic in the lumbar paraspinals. Tenderness of the iliopsoas, lumbar paraspinals and piriformis.     Tenderness     Left Hip   Tenderness in the PSIS, greater trochanter and sacroiliac joint.     Right Hip   Tenderness in the PSIS, greater trochanter and sacroiliac joint.     Strength/Myotome Testing     Left Hip   Planes of Motion   Flexion: 3+  Abduction: 3+  External rotation: 3+    Right Hip   Planes of Motion   Flexion: 3+  Abduction: 3+  External rotation: 3+    Left Knee   Flexion: 4+  Extension: 4    Right Knee   Flexion: 4+  Extension: 4    Left Ankle/Foot   Dorsiflexion: 5    Right Ankle/Foot   Dorsiflexion: 5    Muscle Activation   Patient able to activate left transverse abdominals and right transverse abdominals.     Tests     Lumbar     Left   Negative passive SLR.     Right   Negative passive SLR.     Lumbar Flexibility Comments:   Tightness present B hamstrings, hip flexors, and piriformis.          Assessment & Plan     Assessment  Impairments: abnormal gait, abnormal muscle firing, abnormal muscle tone, abnormal or restricted ROM, activity intolerance, impaired balance, impaired physical strength, lacks appropriate home exercise program, pain with function, safety issue and weight-bearing intolerance  Assessment details: Pt is a 51 y.o. female with low back pain. Pt presents with decreased lumbar AROM and core strength along with decreased ROM R hip and B hip weakness. Pt is having difficultly ambulation. Difficulty with LE weakness. Difficulty sitting >30 min. Difficulty standing >30 min. Oswestry indicates 66% impairment.    Patient presents with the impairments listed above and based on the objective findings and the physical therapy evaluation, the patient’s condition has the potential to improve in response to therapy.   The  patient’s condition and/or services required are at a level of complexity that necessitates the skill & supervision of a physical therapist.    Prognosis: good  Functional Limitations: carrying objects, lifting, sleeping, walking, pulling, uncomfortable because of pain, moving in bed, sitting, standing, stooping and unable to perform repetitive tasks  Goals  Plan Goals: STG:  - Pt to report 50% improvement in pain during sit 30 min in 3 weeks.  - Pt to report 25% or better improvement in LE weakness in 3 weeks.  - Pt to be independent with HEP in 3 weeks.  LTG:  - Improve Oswestry to 25% or less impairment by discharge.  - Increase B hip flex and abd strength to 4/5 or better for improved standing tolerance by discharge.  - Pt to rate max pain at 2-3/10 with activity by discharge.    Plan  Therapy options: will be seen for skilled physical therapy services  Planned modality interventions: electrical stimulation/Russian stimulation, thermotherapy (hydrocollator packs) and traction  Other planned modality interventions: modalities as indicated  Planned therapy interventions: manual therapy, abdominal trunk stabilization, balance/weight-bearing training, body mechanics training, flexibility, functional ROM exercises, home exercise program, joint mobilization, neuromuscular re-education, postural training, soft tissue mobilization, spinal/joint mobilization, strengthening, stretching, therapeutic activities, gait training and transfer training  Frequency: 2x week  Duration in visits: 16  Treatment plan discussed with: patient        Timed:         Manual Therapy:    15     mins  40015;     Therapeutic Exercise:    5     mins  41910;     Neuromuscular Aamir:        mins  29164;    Therapeutic Activity:          mins  21269;     Gait Training:           mins  17387;     Ultrasound:          mins  79909;    Ionto                                   mins   30268  Can Repos          mins 99418    Un-Timed:  Electrical  Stimulation:    15     mins  09251 ( );  Dry Needling          mins self-pay  Traction          mins 00357  Low Eval          Mins  02322  Mod Eval     30     Mins  93028  High Eval                            Mins  10331  Self - Care                          mins  93250        Timed Treatment:   20   mins   Total Treatment:     65   mins    PT SIGNATURE: Rosemary Bro, PT   DATE TREATMENT INITIATED: 8/3/2021    Initial Certification  Certification Period: 11/1/2021  I certify that the therapy services are furnished while this patient is under my care.  The services outlined above are required by this patient, and will be reviewed every 90 days.     PHYSICIAN: Kath Eisenberg MD _____________________________________________________________     DATE:  _______________________________________________    Please sign and return via fax to [phone].. Thank you, [facility].

## 2021-08-13 ENCOUNTER — TREATMENT (OUTPATIENT)
Dept: PHYSICAL THERAPY | Facility: CLINIC | Age: 51
End: 2021-08-13

## 2021-08-13 DIAGNOSIS — M54.16 RADICULOPATHY, LUMBAR REGION: Primary | ICD-10-CM

## 2021-08-13 DIAGNOSIS — R29.898 LEG WEAKNESS, BILATERAL: ICD-10-CM

## 2021-08-13 DIAGNOSIS — G89.29 CHRONIC BILATERAL LOW BACK PAIN WITHOUT SCIATICA: ICD-10-CM

## 2021-08-13 DIAGNOSIS — M54.50 CHRONIC BILATERAL LOW BACK PAIN WITHOUT SCIATICA: ICD-10-CM

## 2021-08-13 PROCEDURE — 97110 THERAPEUTIC EXERCISES: CPT | Performed by: PHYSICAL THERAPIST

## 2021-08-13 PROCEDURE — 97140 MANUAL THERAPY 1/> REGIONS: CPT | Performed by: PHYSICAL THERAPIST

## 2021-08-13 PROCEDURE — 97014 ELECTRIC STIMULATION THERAPY: CPT | Performed by: PHYSICAL THERAPIST

## 2021-08-16 NOTE — PROGRESS NOTES
Physical Therapy Daily Progress Note      Patient: Giovanni Shin   : 1970  Diagnosis/ICD-10 Code:  Radiculopathy, lumbar region [M54.16]   Problems Addressed this Visit     None      Visit Diagnoses     Radiculopathy, lumbar region    -  Primary    Chronic bilateral low back pain without sciatica        Leg weakness, bilateral          Diagnoses       Codes Comments    Radiculopathy, lumbar region    -  Primary ICD-10-CM: M54.16  ICD-9-CM: 724.4     Chronic bilateral low back pain without sciatica     ICD-10-CM: M54.5, G89.29  ICD-9-CM: 724.2, 338.29     Leg weakness, bilateral     ICD-10-CM: R29.898  ICD-9-CM: 729.89         Referring practitioner: Kath Eisenberg MD  Date of Initial Visit: Type: THERAPY  Noted: 8/3/2021  Today's Date: 2021    VISIT#: 2    Subjective : Pt reports continued B hip pain. Doing HEP.    Objective : Added resisted hip abd/add and bridges to ther ex. Issued copy for HEP, pt demonstrated good understanding.    See Exercise, Manual, and Modality Logs for complete treatment.     Assessment/Plan : Continued low back and B hip pain. Good tolerance to ther ex with hip fatigue reported with ther ex. Pt will benefit form continued core and LE strengthening to improve standing and ambulation tolerance with decreased pain.    Progress per Plan of Care and Progress strengthening /stabilization /functional activity         Timed:         Manual Therapy:    15     mins  15550;     Therapeutic Exercise:    10     mins  42135;     Neuromuscular Aamir:        mins  46934;    Therapeutic Activity:          mins  46544;     Gait Training:           mins  81022;     Ultrasound:          mins  23821;    Ionto                                   mins   12269  Self Care                            mins   42585  Canalith Repos                   mins  06039    Un-Timed:  Electrical Stimulation:    15     mins  10783 ( );  Dry Needling          mins self-pay  Traction          mins 36700  Low  Eval          Mins  73455  Mod Eval          Mins  11291  High Eval                            Mins  63059  Re-Eval                               mins  88021    Timed Treatment:   25   mins   Total Treatment:     40   mins    Rosemary Bro, PT  Physical Therapist

## 2021-08-20 ENCOUNTER — TREATMENT (OUTPATIENT)
Dept: PHYSICAL THERAPY | Facility: CLINIC | Age: 51
End: 2021-08-20

## 2021-08-20 DIAGNOSIS — M54.16 RADICULOPATHY, LUMBAR REGION: Primary | ICD-10-CM

## 2021-08-20 DIAGNOSIS — R29.898 LEG WEAKNESS, BILATERAL: ICD-10-CM

## 2021-08-20 DIAGNOSIS — M54.50 CHRONIC BILATERAL LOW BACK PAIN WITHOUT SCIATICA: ICD-10-CM

## 2021-08-20 DIAGNOSIS — G89.29 CHRONIC BILATERAL LOW BACK PAIN WITHOUT SCIATICA: ICD-10-CM

## 2021-08-20 PROCEDURE — 97014 ELECTRIC STIMULATION THERAPY: CPT | Performed by: PHYSICAL THERAPIST

## 2021-08-20 PROCEDURE — 97110 THERAPEUTIC EXERCISES: CPT | Performed by: PHYSICAL THERAPIST

## 2021-08-20 PROCEDURE — 97140 MANUAL THERAPY 1/> REGIONS: CPT | Performed by: PHYSICAL THERAPIST

## 2021-08-20 NOTE — PROGRESS NOTES
Physical Therapy Daily Progress Note      Patient: Giovanni Shin   : 1970  Diagnosis/ICD-10 Code:  Radiculopathy, lumbar region [M54.16]   Problems Addressed this Visit     None      Visit Diagnoses     Radiculopathy, lumbar region    -  Primary    Chronic bilateral low back pain without sciatica        Leg weakness, bilateral          Diagnoses       Codes Comments    Radiculopathy, lumbar region    -  Primary ICD-10-CM: M54.16  ICD-9-CM: 724.4     Chronic bilateral low back pain without sciatica     ICD-10-CM: M54.5, G89.29  ICD-9-CM: 724.2, 338.29     Leg weakness, bilateral     ICD-10-CM: R29.898  ICD-9-CM: 729.89         Referring practitioner: Kath Eisenberg MD  Date of Initial Visit: Type: THERAPY  Noted: 8/3/2021  Today's Date: 2021    VISIT#: 3    Subjective : Pt reports increased pain this morning. States she was in the car for several hours this past couple days. Had B hip muscle soreness after last visit that lasted ~1 day.     Objective : Added clamshells to ther ex and HEP. Provided copy of exercise. Pt demonstrated good understanding with fatigue reported.  Encouraged pt to perform slouching and over correction while at son's football game in attempt to decrease pain while sitting on bleachers. Pt verbalized understanding.  See Exercise, Manual, and Modality Logs for complete treatment.     Assessment/Plan : Mild muscle soreness after last visit. Increased pain after extended time spent in car. Good tolerance to ther ex and manual techniques with no increase in symptoms. Fatigue reported with clamshells B. Pt will benefit from continued core strengthening and proximal stabilization to decrease pain and improved tolerance to all functional activity.    Progress per Plan of Care         Timed:         Manual Therapy:    10     mins  47914;     Therapeutic Exercise:    18     mins  28133;     Neuromuscular Aamir:        mins  01817;    Therapeutic Activity:          mins  27305;     Gait  Training:           mins  89212;     Ultrasound:          mins  20443;    Ionto                                   mins   29079  Self Care                            mins   74183  Canalith Repos                   mins  06001    Un-Timed:  Electrical Stimulation:    15     mins  24309 ( );  Dry Needling          mins self-pay  Traction          mins 68696  Low Eval          Mins  82840  Mod Eval          Mins  03253  High Eval                            Mins  56845  Re-Eval                               mins  65331    Timed Treatment:   28   mins   Total Treatment:     43   mins    Rosemary Bro, PT  Physical Therapist

## 2021-08-23 ENCOUNTER — OFFICE VISIT (OUTPATIENT)
Dept: PAIN MEDICINE | Facility: CLINIC | Age: 51
End: 2021-08-23

## 2021-08-23 VITALS
SYSTOLIC BLOOD PRESSURE: 128 MMHG | HEART RATE: 101 BPM | DIASTOLIC BLOOD PRESSURE: 83 MMHG | RESPIRATION RATE: 16 BRPM | BODY MASS INDEX: 32.95 KG/M2 | HEIGHT: 66 IN | TEMPERATURE: 96.9 F | OXYGEN SATURATION: 96 % | WEIGHT: 205 LBS

## 2021-08-23 DIAGNOSIS — M17.12 PRIMARY OSTEOARTHRITIS OF LEFT KNEE: ICD-10-CM

## 2021-08-23 DIAGNOSIS — M19.012 PRIMARY OSTEOARTHRITIS OF LEFT SHOULDER: ICD-10-CM

## 2021-08-23 DIAGNOSIS — M25.50 ARTHRALGIA OF MULTIPLE JOINTS: ICD-10-CM

## 2021-08-23 DIAGNOSIS — G89.29 CHRONIC BILATERAL LOW BACK PAIN WITH BILATERAL SCIATICA: Primary | ICD-10-CM

## 2021-08-23 DIAGNOSIS — M16.12 PRIMARY OSTEOARTHRITIS OF LEFT HIP: ICD-10-CM

## 2021-08-23 DIAGNOSIS — M54.41 CHRONIC BILATERAL LOW BACK PAIN WITH BILATERAL SCIATICA: Primary | ICD-10-CM

## 2021-08-23 DIAGNOSIS — M79.7 FIBROMYALGIA: ICD-10-CM

## 2021-08-23 DIAGNOSIS — M54.42 CHRONIC BILATERAL LOW BACK PAIN WITH BILATERAL SCIATICA: Primary | ICD-10-CM

## 2021-08-23 DIAGNOSIS — M51.16 LUMBAR DISC DISEASE WITH RADICULOPATHY: ICD-10-CM

## 2021-08-23 PROCEDURE — G0463 HOSPITAL OUTPT CLINIC VISIT: HCPCS | Performed by: PHYSICAL MEDICINE & REHABILITATION

## 2021-08-23 PROCEDURE — 99213 OFFICE O/P EST LOW 20 MIN: CPT | Performed by: PHYSICAL MEDICINE & REHABILITATION

## 2021-08-23 RX ORDER — HYDROCODONE BITARTRATE AND ACETAMINOPHEN 7.5; 325 MG/1; MG/1
1 TABLET ORAL EVERY 6 HOURS PRN
Qty: 120 TABLET | Refills: 0 | Status: SHIPPED | OUTPATIENT
Start: 2021-08-23 | End: 2021-08-31 | Stop reason: SDUPTHER

## 2021-08-23 RX ORDER — HYDROCODONE BITARTRATE AND ACETAMINOPHEN 7.5; 325 MG/1; MG/1
1 TABLET ORAL EVERY 6 HOURS PRN
Qty: 120 TABLET | Refills: 0 | Status: SHIPPED | OUTPATIENT
Start: 2021-08-23 | End: 2021-12-15

## 2021-08-23 NOTE — PROGRESS NOTES
Subjective   Giovanni Shin is a 51 y.o. female.     Chronic LBP radiating to b/l hips, pain in b/l hips and multiple joints, 8/10 at worst, 5/10 at best, always present, varies, began around 2010, worsening, aching, pressure, worse with all activity and inactivity both, interfers with ADLs, sleep, activity, failed chiropractor, PT, LESIs, RFA, was taking Norco 7.5mg BID prn in past. X-ray L hip with mild OA. 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 No FH of substance abuse.       The following portions of the patient's history were reviewed and updated as appropriate: allergies, current medications, past family history, past medical history, past social history, past surgical history and problem list.    Review of Systems   Constitutional: Positive for fatigue. Negative for chills and fever.   HENT: Negative for hearing loss and trouble swallowing.    Eyes: Positive for visual disturbance.   Respiratory: Positive for shortness of breath.    Cardiovascular: Positive for chest pain.   Gastrointestinal: Negative for abdominal pain, constipation, diarrhea, nausea and vomiting.   Genitourinary: Negative for urinary incontinence.   Musculoskeletal: Positive for arthralgias, back pain and joint swelling. Negative for myalgias and neck pain.   Neurological: Positive for headache. Negative for dizziness, weakness and numbness.       Objective   Physical Exam   Constitutional: She is oriented to person, place, and time. She appears well-developed and well-nourished.   HENT:   Head: Normocephalic and atraumatic.   Eyes: Pupils are equal, round, and reactive to light.   Cardiovascular: Normal rate, regular rhythm and normal heart sounds.   Pulmonary/Chest: Breath sounds normal.   Abdominal: Soft. Bowel sounds are normal.  She exhibits no distension. There is no abdominal tenderness.   Neurological: She is alert and oriented to person, place, and time. She has normal reflexes. She displays normal reflexes. No sensory deficit.   Psychiatric: Her behavior is normal. Thought content normal.         Assessment/Plan   Diagnoses and all orders for this visit:    1. Chronic bilateral low back pain with bilateral sciatica (Primary)    2. Fibromyalgia    3. Lumbar disc disease with radiculopathy    4. Primary osteoarthritis of left hip    5. Primary osteoarthritis of left knee    6. Primary osteoarthritis of left shoulder    7. Arthralgia of multiple joints        UDS in order 6/2/21.  Treatment plan will consist of continuing current medication as long as it remains effective and is necessary, while evaluating patient at each visit and determining if the medication can be lowered or discontinued, while also using nonopioid therapies to reduce reliance on opioids.  Began Belbuca 150mcg/hr BID, helped but made her drowsy, also with 75mcg/hr BID.  Did OK on Norco 5mg but needs time release meds with making her too hyper. Began Hysingla 20mg qdaily, still too drowsy.  Restarted Norco 5mg QID prn, increased to 7.5mg QID prn.   Began Pennsaid BID prn, works very well.  Cont Cymbalta. Cannot tolerate Gabapentin, Lyrica. No TCAs with Cymbalta.  Began Celebrex 200mg qdaily prn instead of Ibuprofen, less risk of GI bleed on Lovenox.  Discussed Topamax is better suited for lancinating neuropathic pain, not her pain.  Failed LESIs, RFA. Discussed b/l L4-S1 facet injections per new X-ray.  RTC 3 months for f/u.      INSPECT REPORT     As part of the patient's treatment plan, I am prescribing controlled substances. The patient has been made aware of appropriate use of such medications, including potential risk of somnolence, limited ability to drive and/or work safely, and the potential for dependence or overdose. It has also bee made clear that these  medications are for use by this patient only, without concomitant use of alcohol or other substances unless prescribed.      Patient has completed prescribing agreement detailing terms of continued prescribing of controlled substances, including monitoring INSPECT reports, urine drug screening, and pill counts if necessary. The patient is aware that inappropriate use will results in cessation of prescribing such medications.     INSPECT report has been reviewed and scanned into the patient's chart.     As the clinician, I personally reviewed the INSPECT while the patient was in the office today.     History and physical exam exhibit continued safe and appropriate use of controlled substances.

## 2021-08-24 ENCOUNTER — TREATMENT (OUTPATIENT)
Dept: PHYSICAL THERAPY | Facility: CLINIC | Age: 51
End: 2021-08-24

## 2021-08-24 DIAGNOSIS — R29.898 LEG WEAKNESS, BILATERAL: ICD-10-CM

## 2021-08-24 DIAGNOSIS — G89.29 CHRONIC BILATERAL LOW BACK PAIN WITHOUT SCIATICA: ICD-10-CM

## 2021-08-24 DIAGNOSIS — M54.16 RADICULOPATHY, LUMBAR REGION: Primary | ICD-10-CM

## 2021-08-24 DIAGNOSIS — M54.50 CHRONIC BILATERAL LOW BACK PAIN WITHOUT SCIATICA: ICD-10-CM

## 2021-08-24 PROCEDURE — 97110 THERAPEUTIC EXERCISES: CPT | Performed by: PHYSICAL THERAPIST

## 2021-08-24 PROCEDURE — G0283 ELEC STIM OTHER THAN WOUND: HCPCS | Performed by: PHYSICAL THERAPIST

## 2021-08-24 PROCEDURE — 97140 MANUAL THERAPY 1/> REGIONS: CPT | Performed by: PHYSICAL THERAPIST

## 2021-08-24 NOTE — PROGRESS NOTES
Physical Therapy Daily Progress Note      Patient: Giovanni Shin   : 1970  Diagnosis/ICD-10 Code:  Radiculopathy, lumbar region [M54.16]   Problems Addressed this Visit     None      Visit Diagnoses     Radiculopathy, lumbar region    -  Primary    Chronic bilateral low back pain without sciatica        Leg weakness, bilateral          Diagnoses       Codes Comments    Radiculopathy, lumbar region    -  Primary ICD-10-CM: M54.16  ICD-9-CM: 724.4     Chronic bilateral low back pain without sciatica     ICD-10-CM: M54.5, G89.29  ICD-9-CM: 724.2, 338.29     Leg weakness, bilateral     ICD-10-CM: R29.898  ICD-9-CM: 729.89         Referring practitioner: Kath Eisenberg MD  Date of Initial Visit: Type: THERAPY  Noted: 8/3/2021  Today's Date: 2021    VISIT#: 4    Subjective : Pt reports her back is feeling pretty good today. Had severe pain while at football game Fri night. Rates current pain at 3-4/10. No soreness after last visit.    Objective : Added leg press to ther ex with fatigue reported.    See Exercise, Manual, and Modality Logs for complete treatment.     Assessment/Plan : Decreased pain at start of session. Good tolerance to ther ex with no increase in symptoms. Continues to fatigue with clamshells but improving. Will benefit from continued manual techniques and use of modalities to decrease pain and progression of core and hip strengthening to improve tolerance to all functional mobility and ambulation.    Progress per Plan of Care         Timed:         Manual Therapy:    10     mins  60148;     Therapeutic Exercise:    22     mins  97388;     Neuromuscular Aamir:        mins  73988;    Therapeutic Activity:          mins  09809;     Gait Training:           mins  39825;     Ultrasound:          mins  27072;    Ionto                                   mins   49765  Self Care                            mins   19278  Canalith Repos                   mins  72311    Un-Timed:  Electrical  Stimulation:    15     mins  74034 ( );  Dry Needling          mins self-pay  Traction          mins 00170  Low Eval          Mins  53563  Mod Eval          Mins  03686  High Eval                            Mins  99178  Re-Eval                               mins  16131    Timed Treatment:   32   mins   Total Treatment:     47   mins    Rosemary Bro, PT  Physical Therapist

## 2021-08-26 ENCOUNTER — PATIENT MESSAGE (OUTPATIENT)
Dept: FAMILY MEDICINE CLINIC | Facility: CLINIC | Age: 51
End: 2021-08-26

## 2021-08-27 ENCOUNTER — TREATMENT (OUTPATIENT)
Dept: PHYSICAL THERAPY | Facility: CLINIC | Age: 51
End: 2021-08-27

## 2021-08-27 DIAGNOSIS — M54.50 CHRONIC BILATERAL LOW BACK PAIN WITHOUT SCIATICA: ICD-10-CM

## 2021-08-27 DIAGNOSIS — M54.16 RADICULOPATHY, LUMBAR REGION: Primary | ICD-10-CM

## 2021-08-27 DIAGNOSIS — R29.898 LEG WEAKNESS, BILATERAL: ICD-10-CM

## 2021-08-27 DIAGNOSIS — G89.29 CHRONIC BILATERAL LOW BACK PAIN WITHOUT SCIATICA: ICD-10-CM

## 2021-08-27 PROCEDURE — 97110 THERAPEUTIC EXERCISES: CPT | Performed by: PHYSICAL THERAPIST

## 2021-08-27 PROCEDURE — 97140 MANUAL THERAPY 1/> REGIONS: CPT | Performed by: PHYSICAL THERAPIST

## 2021-08-27 PROCEDURE — G0283 ELEC STIM OTHER THAN WOUND: HCPCS | Performed by: PHYSICAL THERAPIST

## 2021-08-27 NOTE — PROGRESS NOTES
Physical Therapy Daily Progress Note      Patient: Giovanni Shin   : 1970  Diagnosis/ICD-10 Code:  Radiculopathy, lumbar region [M54.16]   Problems Addressed this Visit     None      Visit Diagnoses     Radiculopathy, lumbar region    -  Primary    Chronic bilateral low back pain without sciatica        Leg weakness, bilateral          Diagnoses       Codes Comments    Radiculopathy, lumbar region    -  Primary ICD-10-CM: M54.16  ICD-9-CM: 724.4     Chronic bilateral low back pain without sciatica     ICD-10-CM: M54.5, G89.29  ICD-9-CM: 724.2, 338.29     Leg weakness, bilateral     ICD-10-CM: R29.898  ICD-9-CM: 729.89         Referring practitioner: Kath Eisenberg MD  Date of Initial Visit: Type: THERAPY  Noted: 8/3/2021  Today's Date: 2021    VISIT#: 5    Subjective : Pt reports feeling about the same. States pain fluctuates with different activities. Rates current pain at 3-4/10.    Objective : cues for technique with PPT.    See Exercise, Manual, and Modality Logs for complete treatment.     Assessment/Plan : No significant change in pain but pt showing improved tolerance to ther ex. Pt will benefit from continued stretching of B hips and low back and progression of core and LE strengthening to improve tolerance to all tasks.    Progress per Plan of Care         Timed:         Manual Therapy:    10     mins  70855;     Therapeutic Exercise:    20     mins  71125;     Neuromuscular Aamir:        mins  80175;    Therapeutic Activity:          mins  04538;     Gait Training:           mins  88829;     Ultrasound:          mins  71850;    Ionto                                   mins   66375  Self Care                            mins   49414  Canalith Repos                   mins  40971    Un-Timed:  Electrical Stimulation:    15     mins  31077 ( );  Dry Needling          mins self-pay  Traction          mins 36985  Low Eval          Mins  55963  Mod Eval          Mins  48504  High Eval                             Mins  03824  Re-Eval                               mins  83844    Timed Treatment:   30   mins   Total Treatment:     45   mins    Rosemary Bro, PT  Physical Therapist

## 2021-08-31 ENCOUNTER — TREATMENT (OUTPATIENT)
Dept: PHYSICAL THERAPY | Facility: CLINIC | Age: 51
End: 2021-08-31

## 2021-08-31 ENCOUNTER — OFFICE VISIT (OUTPATIENT)
Dept: FAMILY MEDICINE CLINIC | Facility: CLINIC | Age: 51
End: 2021-08-31

## 2021-08-31 VITALS
WEIGHT: 210.2 LBS | TEMPERATURE: 97.7 F | HEART RATE: 83 BPM | RESPIRATION RATE: 18 BRPM | BODY MASS INDEX: 33.78 KG/M2 | SYSTOLIC BLOOD PRESSURE: 120 MMHG | HEIGHT: 66 IN | OXYGEN SATURATION: 98 % | DIASTOLIC BLOOD PRESSURE: 70 MMHG

## 2021-08-31 DIAGNOSIS — R10.9 ABDOMINAL PAIN IN FEMALE: Primary | ICD-10-CM

## 2021-08-31 DIAGNOSIS — I10 HYPERTENSION, BENIGN: ICD-10-CM

## 2021-08-31 DIAGNOSIS — M54.16 RADICULOPATHY, LUMBAR REGION: Primary | ICD-10-CM

## 2021-08-31 DIAGNOSIS — K58.2 IRRITABLE BOWEL SYNDROME WITH BOTH CONSTIPATION AND DIARRHEA: ICD-10-CM

## 2021-08-31 DIAGNOSIS — R29.898 LEG WEAKNESS, BILATERAL: ICD-10-CM

## 2021-08-31 DIAGNOSIS — G89.29 CHRONIC BILATERAL LOW BACK PAIN WITHOUT SCIATICA: ICD-10-CM

## 2021-08-31 DIAGNOSIS — M54.50 CHRONIC BILATERAL LOW BACK PAIN WITHOUT SCIATICA: ICD-10-CM

## 2021-08-31 DIAGNOSIS — Z87.891 HISTORY OF TOBACCO USE: ICD-10-CM

## 2021-08-31 DIAGNOSIS — E66.01 CLASS 2 SEVERE OBESITY DUE TO EXCESS CALORIES WITH SERIOUS COMORBIDITY AND BODY MASS INDEX (BMI) OF 35.0 TO 35.9 IN ADULT (HCC): ICD-10-CM

## 2021-08-31 LAB
BILIRUB BLD-MCNC: NEGATIVE MG/DL
CLARITY, POC: CLEAR
COLOR UR: YELLOW
GLUCOSE UR STRIP-MCNC: NEGATIVE MG/DL
KETONES UR QL: NEGATIVE
LEUKOCYTE EST, POC: NEGATIVE
NITRITE UR-MCNC: NEGATIVE MG/ML
PH UR: 7.5 [PH] (ref 5–8)
PROT UR STRIP-MCNC: NEGATIVE MG/DL
RBC # UR STRIP: NEGATIVE /UL
SP GR UR: 1.01 (ref 1–1.03)
UROBILINOGEN UR QL: NORMAL

## 2021-08-31 PROCEDURE — 97140 MANUAL THERAPY 1/> REGIONS: CPT | Performed by: PHYSICAL THERAPIST

## 2021-08-31 PROCEDURE — 99213 OFFICE O/P EST LOW 20 MIN: CPT | Performed by: FAMILY MEDICINE

## 2021-08-31 PROCEDURE — 97110 THERAPEUTIC EXERCISES: CPT | Performed by: PHYSICAL THERAPIST

## 2021-08-31 PROCEDURE — 81002 URINALYSIS NONAUTO W/O SCOPE: CPT | Performed by: FAMILY MEDICINE

## 2021-08-31 NOTE — PROGRESS NOTES
Physical Therapy Daily Progress Note      Patient: Giovanni Shin   : 1970  Diagnosis/ICD-10 Code:  Radiculopathy, lumbar region [M54.16]   Problems Addressed this Visit     None      Visit Diagnoses     Radiculopathy, lumbar region    -  Primary    Chronic bilateral low back pain without sciatica        Leg weakness, bilateral          Diagnoses       Codes Comments    Radiculopathy, lumbar region    -  Primary ICD-10-CM: M54.16  ICD-9-CM: 724.4     Chronic bilateral low back pain without sciatica     ICD-10-CM: M54.5, G89.29  ICD-9-CM: 724.2, 338.29     Leg weakness, bilateral     ICD-10-CM: R29.898  ICD-9-CM: 729.89         Referring practitioner: Kath Eisenberg MD  Date of Initial Visit: Type: THERAPY  Noted: 8/3/2021  Today's Date: 2021    VISIT#: 6    Subjective : Pt reports she is feeling a little better this morning. Rates current pain at 2/10. States she feels like her legs are getting stronger. Has appt with PCP after PT appt today, wants to hold on e-stim and MHP today.    Objective : No progression of ther ex this date due to limited time. No cues required for PPT technique this date.    See Exercise, Manual, and Modality Logs for complete treatment.     Assessment/Plan : Decreased pain at start of session. Good tolerance to ther ex with no increase in symptoms. Pt is compliant with HEP and demonstrating improved activity tolerance during PT sessions. Will benefit from continued core and LE strengthening to improve tolerance to all functional activity.    Progress per Plan of Care         Timed:         Manual Therapy:    10     mins  33519;     Therapeutic Exercise:    20     mins  84493;     Neuromuscular Aamir:        mins  66819;    Therapeutic Activity:          mins  30367;     Gait Training:           mins  29044;     Ultrasound:          mins  48812;    Ionto                                   mins   69409  Self Care                            mins   21128  [facility] Repos                    mins  89368    Un-Timed:  Electrical Stimulation:         mins  21504 ( );  Dry Needling          mins self-pay  Traction          mins 84109  Low Eval          Mins  36677  Mod Eval          Mins  60374  High Eval                            Mins  45952  Re-Eval                               mins  01611    Timed Treatment:   30   mins   Total Treatment:     30   mins    Rosemary Bro, PT  Physical Therapist

## 2021-09-03 ENCOUNTER — TREATMENT (OUTPATIENT)
Dept: PHYSICAL THERAPY | Facility: CLINIC | Age: 51
End: 2021-09-03

## 2021-09-03 DIAGNOSIS — R29.898 LEG WEAKNESS, BILATERAL: ICD-10-CM

## 2021-09-03 DIAGNOSIS — G89.29 CHRONIC BILATERAL LOW BACK PAIN WITHOUT SCIATICA: ICD-10-CM

## 2021-09-03 DIAGNOSIS — M54.50 CHRONIC BILATERAL LOW BACK PAIN WITHOUT SCIATICA: ICD-10-CM

## 2021-09-03 DIAGNOSIS — M54.16 RADICULOPATHY, LUMBAR REGION: Primary | ICD-10-CM

## 2021-09-03 PROCEDURE — G0283 ELEC STIM OTHER THAN WOUND: HCPCS | Performed by: PHYSICAL THERAPIST

## 2021-09-03 PROCEDURE — 97140 MANUAL THERAPY 1/> REGIONS: CPT | Performed by: PHYSICAL THERAPIST

## 2021-09-03 PROCEDURE — 97110 THERAPEUTIC EXERCISES: CPT | Performed by: PHYSICAL THERAPIST

## 2021-09-03 NOTE — PROGRESS NOTES
Physical Therapy Daily Progress Note      Patient: Giovanni Shin   : 1970  Diagnosis/ICD-10 Code:  Radiculopathy, lumbar region [M54.16]   Problems Addressed this Visit     None      Visit Diagnoses     Radiculopathy, lumbar region    -  Primary    Chronic bilateral low back pain without sciatica        Leg weakness, bilateral          Diagnoses       Codes Comments    Radiculopathy, lumbar region    -  Primary ICD-10-CM: M54.16  ICD-9-CM: 724.4     Chronic bilateral low back pain without sciatica     ICD-10-CM: M54.5, G89.29  ICD-9-CM: 724.2, 338.29     Leg weakness, bilateral     ICD-10-CM: R29.898  ICD-9-CM: 729.89         Referring practitioner: Kath Eisenberg MD  Date of Initial Visit: Type: THERAPY  Noted: 8/3/2021  Today's Date: 9/3/2021    VISIT#: 7    Subjective : Pt reports she has gone off all pain medicine and NSAIDS due to upset stomach and related to clotting disorder. Rates current pain at 2-3/10    Objective : Added dead bug with leg extension and NuStep to ther ex this date.    See Exercise, Manual, and Modality Logs for complete treatment.     Assessment/Plan : Continued low back pain but slight decrease in intensity today. Good tolerance to ther ex progression with no increase in symptoms. Pt will benefit from continued stretching of B hips and low back and progression of core and LE strengthening to improve tolerance to all tasks.    Progress per Plan of Care         Timed:         Manual Therapy:    8     mins  29696;     Therapeutic Exercise:    30     mins  28824;     Neuromuscular Aamir:        mins  70311;    Therapeutic Activity:          mins  46468;     Gait Training:           mins  01455;     Ultrasound:          mins  27254;    Ionto                                   mins   48938  Self Care                            mins   96112  Canalith Repos                   mins  04005    Un-Timed:  Electrical Stimulation:    15     mins  03849 ( );  Dry Needling          mins  self-pay  Traction          mins 90358  Low Eval          Mins  64859  Mod Eval          Mins  99153  High Eval                            Mins  98791  Re-Eval                               mins  01340    Timed Treatment:   38   mins   Total Treatment:     53   mins    Rosemary Bro, PT  Physical Therapist

## 2021-09-07 ENCOUNTER — TREATMENT (OUTPATIENT)
Dept: PHYSICAL THERAPY | Facility: CLINIC | Age: 51
End: 2021-09-07

## 2021-09-07 DIAGNOSIS — M54.16 RADICULOPATHY, LUMBAR REGION: Primary | ICD-10-CM

## 2021-09-07 DIAGNOSIS — M54.50 CHRONIC BILATERAL LOW BACK PAIN WITHOUT SCIATICA: ICD-10-CM

## 2021-09-07 DIAGNOSIS — R29.898 LEG WEAKNESS, BILATERAL: ICD-10-CM

## 2021-09-07 DIAGNOSIS — G89.29 CHRONIC BILATERAL LOW BACK PAIN WITHOUT SCIATICA: ICD-10-CM

## 2021-09-07 PROCEDURE — 97110 THERAPEUTIC EXERCISES: CPT | Performed by: PHYSICAL THERAPIST

## 2021-09-07 PROCEDURE — 97140 MANUAL THERAPY 1/> REGIONS: CPT | Performed by: PHYSICAL THERAPIST

## 2021-09-07 PROCEDURE — G0283 ELEC STIM OTHER THAN WOUND: HCPCS | Performed by: PHYSICAL THERAPIST

## 2021-09-07 NOTE — PROGRESS NOTES
Physical Therapy Daily Progress Note      Patient: Giovanni Shin   : 1970  Diagnosis/ICD-10 Code:  Radiculopathy, lumbar region [M54.16]   Problems Addressed this Visit     None      Visit Diagnoses     Radiculopathy, lumbar region    -  Primary    Chronic bilateral low back pain without sciatica        Leg weakness, bilateral          Diagnoses       Codes Comments    Radiculopathy, lumbar region    -  Primary ICD-10-CM: M54.16  ICD-9-CM: 724.4     Chronic bilateral low back pain without sciatica     ICD-10-CM: M54.5, G89.29  ICD-9-CM: 724.2, 338.29     Leg weakness, bilateral     ICD-10-CM: R29.898  ICD-9-CM: 729.89         Referring practitioner: Kath Eisenberg MD  Date of Initial Visit: Type: THERAPY  Noted: 8/3/2021  Today's Date: 2021    VISIT#: 8    Subjective : Pt reports that her legs and low back are achy like she did a couple days of exercise but did not do any extra activity over the weekend.    Objective : pain reported during bridges. NuStep held this date due to increased pain.    See Exercise, Manual, and Modality Logs for complete treatment.     Assessment/Plan : Increased pain at start of session. Good tolerance to manual techniques and ther ex. Pt will benefit from continued PT for manual techniques and use of modalities to improved pain and progression of core and LE strengthening to improved tolerance to functional activities.    Progress per Plan of Care         Timed:         Manual Therapy:    15     mins  97771;     Therapeutic Exercise:    15     mins  83864;     Neuromuscular Aamir:        mins  76452;    Therapeutic Activity:          mins  03745;     Gait Training:           mins  94731;     Ultrasound:          mins  30900;    Ionto                                   mins   04500  Self Care                            mins   55763  Canalith Repos                   mins  70120    Un-Timed:  Electrical Stimulation:    15     mins  59225 ( );  Dry Needling          mins  self-pay  Traction          mins 81715  Low Eval          Mins  16517  Mod Eval          Mins  38832  High Eval                            Mins  76234  Re-Eval                               mins  03643    Timed Treatment:   30   mins   Total Treatment:     45   mins    Rosemary Bro, PT  Physical Therapist

## 2021-09-10 ENCOUNTER — TREATMENT (OUTPATIENT)
Dept: PHYSICAL THERAPY | Facility: CLINIC | Age: 51
End: 2021-09-10

## 2021-09-10 DIAGNOSIS — M54.16 RADICULOPATHY, LUMBAR REGION: Primary | ICD-10-CM

## 2021-09-10 DIAGNOSIS — R29.898 LEG WEAKNESS, BILATERAL: ICD-10-CM

## 2021-09-10 DIAGNOSIS — M54.50 CHRONIC BILATERAL LOW BACK PAIN WITHOUT SCIATICA: ICD-10-CM

## 2021-09-10 DIAGNOSIS — G89.29 CHRONIC BILATERAL LOW BACK PAIN WITHOUT SCIATICA: ICD-10-CM

## 2021-09-10 PROCEDURE — 97140 MANUAL THERAPY 1/> REGIONS: CPT | Performed by: PHYSICAL THERAPIST

## 2021-09-10 PROCEDURE — 97110 THERAPEUTIC EXERCISES: CPT | Performed by: PHYSICAL THERAPIST

## 2021-09-10 NOTE — PROGRESS NOTES
Physical Therapy Daily Progress Note    VISIT#: 9    Subjective   Giovanni Shin reports that she is having pain on the R side of her low back and into the hip.   Pain Rating: 3    Objective     See Exercise, Manual, and Modality Logs for complete treatment.     Patient Education: muscle tone in hip    Assessment/Plan   Pt presented with increased R hip pain but tolerated manual therapy well. Pt exhibits increased muscle tone in R hip abductors that responded well to STM. Initiated STS activity, pt tolerated well.     Progress per Plan of Care and Progress strengthening /stabilization /functional activity          Timed:         Manual Therapy:    16     mins  25012;     Therapeutic Exercise:    25     mins  13748;     Neuromuscular Aamir:        mins  35059;    Therapeutic Activity:          mins  05635;     Gait Training:           mins  04333;     Ultrasound:          mins  49232;    Ionto                                   mins   31146  Self Care                            mins   51719  Canalith Repos                   mins  60540    Un-Timed:  Electrical Stimulation:         mins  99157 ( );  Dry Needling          mins self-pay  Traction          mins 05317  Low Eval          Mins  29575  Mod Eval          Mins  51961  High Eval                            Mins  61355  Re-Eval                               mins  07302    Timed Treatment:   41   mins   Total Treatment:     41   mins    Barb Lovell  Physical Therapist Assistant  [place] License # [id number]

## 2021-09-13 ENCOUNTER — TREATMENT (OUTPATIENT)
Dept: PHYSICAL THERAPY | Facility: CLINIC | Age: 51
End: 2021-09-13

## 2021-09-13 DIAGNOSIS — G89.29 CHRONIC BILATERAL LOW BACK PAIN WITHOUT SCIATICA: ICD-10-CM

## 2021-09-13 DIAGNOSIS — M54.16 RADICULOPATHY, LUMBAR REGION: Primary | ICD-10-CM

## 2021-09-13 DIAGNOSIS — R29.898 LEG WEAKNESS, BILATERAL: ICD-10-CM

## 2021-09-13 DIAGNOSIS — M54.50 CHRONIC BILATERAL LOW BACK PAIN WITHOUT SCIATICA: ICD-10-CM

## 2021-09-13 PROCEDURE — 97140 MANUAL THERAPY 1/> REGIONS: CPT | Performed by: PHYSICAL THERAPIST

## 2021-09-13 PROCEDURE — G0283 ELEC STIM OTHER THAN WOUND: HCPCS | Performed by: PHYSICAL THERAPIST

## 2021-09-13 PROCEDURE — 97110 THERAPEUTIC EXERCISES: CPT | Performed by: PHYSICAL THERAPIST

## 2021-09-13 NOTE — PROGRESS NOTES
Physical Therapy Daily Progress Note      Patient: Giovanni Shin   : 1970  Diagnosis/ICD-10 Code:  Radiculopathy, lumbar region [M54.16]   Problems Addressed this Visit     None      Visit Diagnoses     Radiculopathy, lumbar region    -  Primary    Chronic bilateral low back pain without sciatica        Leg weakness, bilateral          Diagnoses       Codes Comments    Radiculopathy, lumbar region    -  Primary ICD-10-CM: M54.16  ICD-9-CM: 724.4     Chronic bilateral low back pain without sciatica     ICD-10-CM: M54.5, G89.29  ICD-9-CM: 724.2, 338.29     Leg weakness, bilateral     ICD-10-CM: R29.898  ICD-9-CM: 729.89         Referring practitioner: Kath Eisenberg MD  Date of Initial Visit: Type: THERAPY  Noted: 8/3/2021  Today's Date: 2021    VISIT#: 10    Subjective :Pt reports she was having L hip, knee, and ankle pain Fri evening and Fri night. Had difficulty sleeping. 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 Reports she is actually feeling pretty good today despite the busy weekend. Rates current pain at 2-3/10.    Objective : tenderness R piriformis. Good response to stretching.     See Exercise, Manual, and Modality Logs for complete treatment.     Assessment/Plan : Decreased pain overall at start of session. Improved activity tolerance reported with no increase in symptoms. Good tolerance to all ther ex with pt reporting fatigue with bridges. Pt will benefit from continued PT for manual techniques and use of modalities to improved pain and progression of core and LE strengthening to improved tolerance to functional activities.    Progress per Plan of Care and Progress strengthening /stabilization /functional activity         Timed:         Manual Therapy:    10     mins  97747;     Therapeutic Exercise:    20     mins  73826;     Neuromuscular Aamir:        mins  05118;    Therapeutic Activity:          mins  54692;      Gait Training:           mins  84467;     Ultrasound:          mins  25078;    Ionto                                   mins   17073  Self Care                            mins   39186  Canalith Repos                   mins  90321    Un-Timed:  Electrical Stimulation:    15     mins  57647 ( );  Dry Needling          mins self-pay  Traction          mins 26002  Low Eval          Mins  82356  Mod Eval          Mins  64390  High Eval                            Mins  19261  Re-Eval                               mins  15422    Timed Treatment:   30   mins   Total Treatment:     45   mins    Rosemary Bro, PT  Physical Therapist

## 2021-09-15 ENCOUNTER — TREATMENT (OUTPATIENT)
Dept: PHYSICAL THERAPY | Facility: CLINIC | Age: 51
End: 2021-09-15

## 2021-09-15 DIAGNOSIS — M54.50 CHRONIC BILATERAL LOW BACK PAIN WITHOUT SCIATICA: ICD-10-CM

## 2021-09-15 DIAGNOSIS — R29.898 LEG WEAKNESS, BILATERAL: ICD-10-CM

## 2021-09-15 DIAGNOSIS — G89.29 CHRONIC BILATERAL LOW BACK PAIN WITHOUT SCIATICA: ICD-10-CM

## 2021-09-15 DIAGNOSIS — M54.16 RADICULOPATHY, LUMBAR REGION: Primary | ICD-10-CM

## 2021-09-15 PROCEDURE — G0283 ELEC STIM OTHER THAN WOUND: HCPCS | Performed by: PHYSICAL THERAPIST

## 2021-09-15 PROCEDURE — 97110 THERAPEUTIC EXERCISES: CPT | Performed by: PHYSICAL THERAPIST

## 2021-09-15 PROCEDURE — 97140 MANUAL THERAPY 1/> REGIONS: CPT | Performed by: PHYSICAL THERAPIST

## 2021-09-15 NOTE — PROGRESS NOTES
Physical Therapy Daily Progress Note      Patient: Giovanni Shin   : 1970  Diagnosis/ICD-10 Code:  Radiculopathy, lumbar region [M54.16]   Problems Addressed this Visit     None      Visit Diagnoses     Radiculopathy, lumbar region    -  Primary    Chronic bilateral low back pain without sciatica        Leg weakness, bilateral          Diagnoses       Codes Comments    Radiculopathy, lumbar region    -  Primary ICD-10-CM: M54.16  ICD-9-CM: 724.4     Chronic bilateral low back pain without sciatica     ICD-10-CM: M54.5, G89.29  ICD-9-CM: 724.2, 338.29     Leg weakness, bilateral     ICD-10-CM: R29.898  ICD-9-CM: 729.89         Referring practitioner: Kath Eisenberg MD  Date of Initial Visit: Type: THERAPY  Noted: 8/3/2021  Today's Date: 9/15/2021    VISIT#: 11    Subjective :Pt reports having increased pain at R Lateral hip and muscle soreness through R thigh on Mon after PT visit. This was better yesterday and more improved today.    Objective : No exercise progression this date.    See Exercise, Manual, and Modality Logs for complete treatment.     Assessment/Plan : Mild increase in soreness after last visit. Good tolerance to manual techniques and ther ex with no increase in symptoms. Pt will benefit from continued PT services to further progress core and LE strength for improved tolerance to all functional tasks and activities with less pain.    Progress per Plan of Care and Progress strengthening /stabilization /functional activity         Timed:         Manual Therapy:    10     mins  21206;     Therapeutic Exercise:    20     mins  43707;     Neuromuscular Aamir:        mins  45081;    Therapeutic Activity:          mins  15648;     Gait Training:           mins  13373;     Ultrasound:          mins  36232;    Ionto                                   mins   77375  Self Care                            mins   49271  Canalith Repos                   mins  52442    Un-Timed:  Electrical Stimulation:   15       mins  23593 ( );  Dry Needling          mins self-pay  Traction          mins 48529  Low Eval          Mins  02266  Mod Eval          Mins  58499  High Eval                            Mins  68819  Re-Eval                               mins  00548    Timed Treatment:   30   mins   Total Treatment:     45   mins    Rosemary Bro, PT  Physical Therapist

## 2021-09-27 ENCOUNTER — TREATMENT (OUTPATIENT)
Dept: PHYSICAL THERAPY | Facility: CLINIC | Age: 51
End: 2021-09-27

## 2021-09-27 DIAGNOSIS — M54.16 RADICULOPATHY, LUMBAR REGION: Primary | ICD-10-CM

## 2021-09-27 DIAGNOSIS — G89.29 CHRONIC BILATERAL LOW BACK PAIN WITHOUT SCIATICA: ICD-10-CM

## 2021-09-27 DIAGNOSIS — R29.898 LEG WEAKNESS, BILATERAL: ICD-10-CM

## 2021-09-27 DIAGNOSIS — M54.50 CHRONIC BILATERAL LOW BACK PAIN WITHOUT SCIATICA: ICD-10-CM

## 2021-09-27 PROCEDURE — 97140 MANUAL THERAPY 1/> REGIONS: CPT | Performed by: PHYSICAL THERAPIST

## 2021-09-27 PROCEDURE — G0283 ELEC STIM OTHER THAN WOUND: HCPCS | Performed by: PHYSICAL THERAPIST

## 2021-09-27 PROCEDURE — 97110 THERAPEUTIC EXERCISES: CPT | Performed by: PHYSICAL THERAPIST

## 2021-09-27 NOTE — PROGRESS NOTES
"Physical Therapy Daily Progress Note      Patient: Giovanni Shin   : 1970  Diagnosis/ICD-10 Code:  Radiculopathy, lumbar region [M54.16]   Problems Addressed this Visit     None      Visit Diagnoses     Radiculopathy, lumbar region    -  Primary    Chronic bilateral low back pain without sciatica        Leg weakness, bilateral          Diagnoses       Codes Comments    Radiculopathy, lumbar region    -  Primary ICD-10-CM: M54.16  ICD-9-CM: 724.4     Chronic bilateral low back pain without sciatica     ICD-10-CM: M54.5, G89.29  ICD-9-CM: 724.2, 338.29     Leg weakness, bilateral     ICD-10-CM: R29.898  ICD-9-CM: 729.89         Referring practitioner: Kath Eisenberg MD  Date of Initial Visit: Type: THERAPY  Noted: 8/3/2021  Today's Date: 2021    VISIT#: 12    Subjective : Pt reports she was sick with sinus infection and is feeling much better now. Tested negative for COVID. States low back is feeling better. Having mild pain in low back this morning. Had R hip pain over the weekend x2 days. Having L groin pain this morning when she got up and after getting out of the car. Rates current pain at 3-4/10.    Objective : tender to palpation L iliopsoas  Added SKTC in hooklying.  See Exercise, Manual, and Modality Logs for complete treatment.     Assessment/Plan : Flucuating pain since last visit with decreased pain today. Good tolerance to manual techniques and ther ex with mild \"pulling\" reported during bridges. Decreased pain reported post-tx. Pt will benefit from continued PT services to further progress core and LE strength for improved tolerance to all functional tasks and activities with less pain.    Progress per Plan of Care and Progress strengthening /stabilization /functional activity         Timed:         Manual Therapy:    10     mins  68489;     Therapeutic Exercise:    20     mins  36451;     Neuromuscular Aamir:        mins  22065;    Therapeutic Activity:          mins  41420;     Gait " Training:           mins  54343;     Ultrasound:          mins  77491;    Ionto                                   mins   27119  Self Care                            mins   19772  Canalith Repos                   mins  40775    Un-Timed:  Electrical Stimulation:    15     mins  65405 ( );  Dry Needling          mins self-pay  Traction          mins 30791  Low Eval          Mins  29184  Mod Eval          Mins  94648  High Eval                            Mins  74580  Re-Eval                               mins  17746    Timed Treatment:   30   mins   Total Treatment:     45   mins    Rosemary Bro, PT  Physical Therapist

## 2021-09-29 ENCOUNTER — TREATMENT (OUTPATIENT)
Dept: PHYSICAL THERAPY | Facility: CLINIC | Age: 51
End: 2021-09-29

## 2021-09-29 DIAGNOSIS — M54.50 CHRONIC BILATERAL LOW BACK PAIN WITHOUT SCIATICA: ICD-10-CM

## 2021-09-29 DIAGNOSIS — G89.29 CHRONIC BILATERAL LOW BACK PAIN WITHOUT SCIATICA: ICD-10-CM

## 2021-09-29 DIAGNOSIS — R29.898 LEG WEAKNESS, BILATERAL: ICD-10-CM

## 2021-09-29 DIAGNOSIS — M54.16 RADICULOPATHY, LUMBAR REGION: Primary | ICD-10-CM

## 2021-09-29 PROCEDURE — 97110 THERAPEUTIC EXERCISES: CPT | Performed by: PHYSICAL THERAPIST

## 2021-09-29 PROCEDURE — 97140 MANUAL THERAPY 1/> REGIONS: CPT | Performed by: PHYSICAL THERAPIST

## 2021-09-29 NOTE — PROGRESS NOTES
Physical Therapy Daily Progress Note      Patient: Giovanni Shin   : 1970  Diagnosis/ICD-10 Code:  Radiculopathy, lumbar region [M54.16]   Problems Addressed this Visit     None      Visit Diagnoses     Radiculopathy, lumbar region    -  Primary    Chronic bilateral low back pain without sciatica        Leg weakness, bilateral          Diagnoses       Codes Comments    Radiculopathy, lumbar region    -  Primary ICD-10-CM: M54.16  ICD-9-CM: 724.4     Chronic bilateral low back pain without sciatica     ICD-10-CM: M54.5, G89.29  ICD-9-CM: 724.2, 338.29     Leg weakness, bilateral     ICD-10-CM: R29.898  ICD-9-CM: 729.89         Referring practitioner: Kath Eisenberg MD  Date of Initial Visit: Type: THERAPY  Noted: 8/3/2021  Today's Date: 2021    VISIT#: 13    Subjective : pt reports she is feeling better. Rates current pain at 2-3/10 and is located all areas L hip (back, side, and front). Having mild R hip pain also. Max pain this week has been 5-6/10. States she feels like some of her pain may be related to fibromyalgia. Pt expresses readiness to manage with HEP.  Oswestry = 25/50, indicating 50% impairment.    Objective : B hip flex and abd = 4/5    See Exercise, Manual, and Modality Logs for complete treatment.     Assessment/Plan : Decreased pain at start of session. Good tolerance to all ther ex and manual techniques with no increase in symptoms. Pt has made good strength gains B hips. Pt continues to have fluctuating pain. Pt to manage with HEP and encouraged to call if additional visits needed over the next couple weeks. Pt verbalized understanding and in agreement.    STG:  - Pt to report 50% improvement in pain during sit 30 min in 3 weeks. - MET  - Pt to report 25% or better improvement in LE weakness in 3 weeks. - MET  - Pt to be independent with HEP in 3 weeks. - MET  LTG:  - Improve Oswestry to 25% or less impairment by discharge. - Not met (has improved to 50% impairment from 66%  impairment)  - Increase B hip flex and abd strength to 4/5 or better for improved standing tolerance by discharge. - MET  - Pt to rate max pain at 2-3/10 with activity by discharge. - Not met    Anticipate D/C to HEP if no additional visits made.         Timed:         Manual Therapy:    8     mins  11609;     Therapeutic Exercise:    20     mins  17563;     Neuromuscular Aamir:        mins  34530;    Therapeutic Activity:          mins  84411;     Gait Training:           mins  38253;     Ultrasound:          mins  01787;    Ionto                                   mins   83673  Self Care                            mins   40952  Canalith Repos                   mins  20727    Un-Timed:  Electrical Stimulation:         mins  33099 ( );  Dry Needling          mins self-pay  Traction          mins 19600  Low Eval          Mins  50073  Mod Eval          Mins  62207  High Eval                           Mins  52353  Re-Eval                               mins  46227    Timed Treatment:   28   mins   Total Treatment:     28   mins    Rosemary Bro, PT  Physical Therapist

## 2021-10-08 ENCOUNTER — OFFICE VISIT (OUTPATIENT)
Dept: FAMILY MEDICINE CLINIC | Facility: CLINIC | Age: 51
End: 2021-10-08

## 2021-10-08 VITALS
TEMPERATURE: 97.3 F | BODY MASS INDEX: 32.56 KG/M2 | HEART RATE: 90 BPM | OXYGEN SATURATION: 98 % | RESPIRATION RATE: 18 BRPM | HEIGHT: 66 IN | DIASTOLIC BLOOD PRESSURE: 80 MMHG | WEIGHT: 202.6 LBS | SYSTOLIC BLOOD PRESSURE: 110 MMHG

## 2021-10-08 DIAGNOSIS — J20.8 ACUTE BACTERIAL BRONCHITIS: ICD-10-CM

## 2021-10-08 DIAGNOSIS — E66.01 CLASS 2 SEVERE OBESITY DUE TO EXCESS CALORIES WITH SERIOUS COMORBIDITY AND BODY MASS INDEX (BMI) OF 35.0 TO 35.9 IN ADULT (HCC): ICD-10-CM

## 2021-10-08 DIAGNOSIS — Z87.891 HISTORY OF TOBACCO USE: ICD-10-CM

## 2021-10-08 DIAGNOSIS — B96.89 ACUTE BACTERIAL BRONCHITIS: ICD-10-CM

## 2021-10-08 DIAGNOSIS — H92.09 EARACHE: Primary | ICD-10-CM

## 2021-10-08 PROCEDURE — 99213 OFFICE O/P EST LOW 20 MIN: CPT | Performed by: FAMILY MEDICINE

## 2021-10-08 RX ORDER — CEPHALEXIN 500 MG/1
500 CAPSULE ORAL 3 TIMES DAILY
Qty: 30 CAPSULE | Refills: 1 | Status: SHIPPED | OUTPATIENT
Start: 2021-10-08 | End: 2021-12-15

## 2021-10-08 NOTE — PROGRESS NOTES
Subjective   Giovanni Shin is a 51 y.o. female.     Chief Complaint   Patient presents with   • Earache       Earache   There is pain in the right ear. This is a new problem. The current episode started in the past 7 days. The problem has been gradually worsening. There has been no fever. Associated symptoms include diarrhea and rhinorrhea. Pertinent negatives include no abdominal pain, coughing, ear discharge, headaches, hearing loss, neck pain, rash, sore throat or vomiting. She has tried nothing for the symptoms. The treatment provided no relief.            I personally reviewed and updated the patient's allergies, medications, problem list, and past medical, surgical, social, and family history. I have reviewed and confirmed the accuracy of the History of Present Illness and Review of Symptoms as documented by the MA/LPN/RN. Chuck Goddard MD    Family History   Problem Relation Age of Onset   • Hyperlipidemia Mother    • Hypertension Mother    • Hypertension Father    • Factor V Leiden deficiency Father    • Factor V Leiden deficiency Brother    • Colon cancer Maternal Grandfather    • Heart disease Maternal Grandfather    • Down syndrome Maternal Cousin    • Dementia Maternal Grandmother    • Stroke Paternal Grandmother    • Stroke Paternal Grandfather        Social History     Tobacco Use   • Smoking status: Former Smoker     Packs/day: 0.05     Years: 11.00     Pack years: 0.55     Types: Cigarettes     Start date:      Quit date: 2017     Years since quittin.8   • Smokeless tobacco: Never Used   Vaping Use   • Vaping Use: Never used   Substance Use Topics   • Alcohol use: Yes     Comment: rarely   • Drug use: No       Past Surgical History:   Procedure Laterality Date   • COLONOSCOPY N/A 2021    Procedure: COLONOSCOPY with polypectomy;  Surgeon: Lance Mccarty MD;  Location: [facility] ENDOSCOPY;  Service: Gastroenterology;  Laterality: N/A;  post op: polyps       Patient Active  Problem List   Diagnosis   • Age-related cataract of both eyes   • Allergic rhinitis due to pollen   • Anxiety and depression   • Arthralgia of multiple joints   • Encounter for annual general medical examination with abnormal findings in adult   • Encounter for screening mammogram for malignant neoplasm of breast   • Gastroesophageal reflux disease without esophagitis   • Heterozygous factor V Leiden mutation (HCC)   • History of DVT (deep vein thrombosis)   • History of pulmonary embolism   • Hypertension, benign   • Lumbar disc disease with radiculopathy   • Class 2 severe obesity due to excess calories with serious comorbidity and body mass index (BMI) of 35.0 to 35.9 in adult ([facility])   • Primary osteoarthritis of left knee   • Screening for cervical cancer   • History of tobacco use   • Fibromyalgia   • Primary osteoarthritis of left hip   • Chronic bilateral low back pain with bilateral sciatica   • Abnormal perimenopausal bleeding   • Primary osteoarthritis of left shoulder   • Asymptomatic menopausal state   • Screening for colon cancer   • Presence of 52 mg levonorgestrel-releasing intrauterine device (IUD)   • Menorrhagia with regular cycle   • Hyperplastic polyp of descending colon   • Acute bacterial bronchitis         Current Outpatient Medications:   •  atenolol (TENORMIN) 25 MG tablet, Take 1 tablet by mouth Daily., Disp: 90 tablet, Rfl: 3  •  DULoxetine (CYMBALTA) 60 MG capsule, Take 1 capsule by mouth Daily., Disp: 90 capsule, Rfl: 3  •  loratadine (CLARITIN) 10 MG tablet, Take 1 tablet by mouth Daily., Disp: , Rfl:   •  losartan (COZAAR) 25 MG tablet, Take 1 tablet by mouth Daily., Disp: 90 tablet, Rfl: 3  •  Mirena, 52 MG, 20 MCG/24HR IUD, , Disp: , Rfl:   •  montelukast (SINGULAIR) 10 MG tablet, Take 1 tablet by mouth Daily., Disp: 90 tablet, Rfl: 3  •  Pennsaid 2 % solution, PLACE 2 PUMPS ON THE SKIN AS DIRECTED TWICE DAILY AS NEEDED FOR PAIN, Disp: 112 g, Rfl: 11  •  pseudoephedrine (SUDAFED) 30  "MG tablet, Take 1 tablet by mouth Every 4 (Four) Hours., Disp: , Rfl:   •  RABEprazole (ACIPHEX) 20 MG EC tablet, Take 1 tablet by mouth Daily As Needed (GERD)., Disp: 90 tablet, Rfl: 4  •  XARELTO 20 MG tablet, Take 20 mg by mouth Daily., Disp: , Rfl:   •  cephalexin (KEFLEX) 500 MG capsule, Take 1 capsule by mouth 3 (Three) Times a Day., Disp: 30 capsule, Rfl: 1  •  HYDROcodone-acetaminophen (Norco) 7.5-325 MG per tablet, Take 1 tablet by mouth Every 6 (Six) Hours As Needed for Moderate Pain ., Disp: 120 tablet, Rfl: 0         Review of Systems   Constitutional: Negative for chills and diaphoresis.   HENT: Positive for ear pain and rhinorrhea. Negative for ear discharge, hearing loss and sore throat.    Eyes: Negative for visual disturbance.   Respiratory: Negative for cough and shortness of breath.    Cardiovascular: Negative for chest pain and palpitations.   Gastrointestinal: Positive for diarrhea. Negative for abdominal pain, nausea and vomiting.   Endocrine: Negative for polydipsia and polyphagia.   Musculoskeletal: Negative for neck pain and neck stiffness.   Skin: Negative for color change, pallor and rash.   Neurological: Negative for seizures and syncope.   Hematological: Negative for adenopathy.       I have reviewed and confirmed the accuracy of the ROS as documented by the MA/LPN/RN Chuck Goddard MD      Objective   /80   Pulse 90   Temp 97.3 °F (36.3 °C)   Resp 18   Ht 167.6 cm (66\")   Wt 91.9 kg (202 lb 9.6 oz)   SpO2 98%   Breastfeeding No   BMI 32.70 kg/m²   BP Readings from Last 3 Encounters:   10/08/21 110/80   08/31/21 120/70   08/23/21 128/83     Wt Readings from Last 3 Encounters:   10/08/21 91.9 kg (202 lb 9.6 oz)   08/31/21 95.3 kg (210 lb 3.2 oz)   08/23/21 93 kg (205 lb)     Physical Exam  Constitutional:       Appearance: Normal appearance. She is well-developed. She is not diaphoretic.   HENT:      Head: Normocephalic.      Right Ear: Hearing, ear canal and external ear " normal. A middle ear effusion is present. Tympanic membrane is erythematous.      Left Ear: Hearing, ear canal and external ear normal. A middle ear effusion is present. Tympanic membrane is erythematous.      Nose: Congestion present.      Right Sinus: Maxillary sinus tenderness and frontal sinus tenderness present.      Left Sinus: Maxillary sinus tenderness and frontal sinus tenderness present.      Mouth/Throat:      Pharynx: Posterior oropharyngeal erythema present.      Tonsils: No tonsillar abscesses. 1+ on the right. 1+ on the left.   Eyes:      General: Lids are normal.      Conjunctiva/sclera: Conjunctivae normal.      Pupils: Pupils are equal, round, and reactive to light.   Neck:      Meningeal: Brudzinski's sign and Kernig's sign absent.   Cardiovascular:      Rate and Rhythm: Normal rate and regular rhythm.      Pulses: Normal pulses.      Heart sounds: Normal heart sounds, S1 normal and S2 normal. No murmur heard.  No friction rub. No gallop.    Pulmonary:      Effort: Pulmonary effort is normal. No accessory muscle usage or respiratory distress.      Breath sounds: No stridor. Examination of the right-upper field reveals wheezing and rhonchi. Examination of the left-upper field reveals wheezing and rhonchi. Examination of the right-middle field reveals wheezing and rhonchi. Examination of the left-middle field reveals wheezing and rhonchi. Examination of the right-lower field reveals wheezing and rhonchi. Examination of the left-lower field reveals wheezing and rhonchi. Wheezing and rhonchi present. No decreased breath sounds or rales.   Abdominal:      General: Bowel sounds are normal. There is no distension.      Palpations: Abdomen is soft. There is no mass.      Tenderness: There is no abdominal tenderness.      Hernia: No hernia is present.   Skin:     General: Skin is warm and dry.      Coloration: Skin is not pale.   Neurological:      Mental Status: She is alert and oriented to person, place,  and time.      Cranial Nerves: No cranial nerve deficit.      Coordination: Coordination normal.      Gait: Gait normal.         Data / Lab Results:    No results found for: HGBA1C  Lab Results   Component Value Date     (H) 03/03/2021     Lab Results   Component Value Date     (H) 03/03/2021     (H) 05/19/2020     (H) 11/08/2017     Lab Results   Component Value Date    CHOL 189 11/08/2017    CHOL 239 (H) 09/07/2016     Lab Results   Component Value Date    TRIG 131 03/03/2021    TRIG 132 05/19/2020    TRIG 155 (H) 11/08/2017     Lab Results   Component Value Date    HDL 71 03/03/2021    HDL 73 05/19/2020    HDL 52 11/08/2017     No results found for: PSA  Lab Results   Component Value Date    WBC 7.8 03/03/2021    HGB 13.7 03/03/2021    HCT 39.8 03/03/2021    MCV 91 03/03/2021     03/03/2021     Lab Results   Component Value Date    TSH 0.870 03/03/2021      Lab Results   Component Value Date    BUN 11 03/03/2021    CREATININE 0.93 03/03/2021    EGFRIFNONA 70 03/03/2021    EGFRIFAFRI 81 03/03/2021    BCR 12 03/03/2021    K 4.5 03/03/2021    CO2 22 03/03/2021    CALCIUM 10.9 (H) 03/03/2021    PROTENTOTREF 7.3 03/03/2021    ALBUMIN 4.4 03/03/2021    LABIL2 1.5 03/03/2021    AST 21 03/03/2021    ALT 14 03/03/2021     No results found for: ALYX, RF, SEDRATE   No results found for: CRP   No results found for: IRON, TIBC, FERRITIN   No results found for: GWXDBLOL02       Assessment/Plan      Medications        Problem List         LOS    Acute bacterial sinusitis.  Start antibiotics.  Increase fluid intake.  Call return if fever worsening symptoms  Allergic rhinitis.  OTC Claritin or Zyrtec.  Increase fluid intake.  Does not tolerate nasal steroids.      Diagnoses and all orders for this visit:    1. Earache (Primary)  -     cephalexin (KEFLEX) 500 MG capsule; Take 1 capsule by mouth 3 (Three) Times a Day.  Dispense: 30 capsule; Refill: 1    2. Class 2 severe obesity due to excess  calories with serious comorbidity and body mass index (BMI) of 35.0 to 35.9 in adult (HCC)    3. History of tobacco use    4. Acute bacterial bronchitis              Expected course, medications, and adverse effects discussed.  Call or return if worsening or persistent symptoms.  I wore protective equipment throughout this patient encounter including a mask, gloves, and eye protection.  Hand hygiene was performed before donning protective equipment and after removal when leaving the room. The complete contents of the Assessment and Plan and Data/Lab Results as documented above have been reviewed and addressed by myself with the patient today as part of an ongoing evaluation / treatment plan.  If some of the documentation has been copied from a previous note and is unchanged it indicates that this problem / plan has been assessed today but is stable from a previous visit and no changes have been recommended.

## 2021-10-24 PROBLEM — B96.89 ACUTE BACTERIAL BRONCHITIS: Status: ACTIVE | Noted: 2021-10-24

## 2021-10-24 PROBLEM — J20.8 ACUTE BACTERIAL BRONCHITIS: Status: ACTIVE | Noted: 2021-10-24

## 2021-11-15 ENCOUNTER — APPOINTMENT (OUTPATIENT)
Dept: PAIN MEDICINE | Facility: CLINIC | Age: 51
End: 2021-11-15

## 2021-11-29 DIAGNOSIS — F41.9 ANXIETY AND DEPRESSION: ICD-10-CM

## 2021-11-29 DIAGNOSIS — F32.A ANXIETY AND DEPRESSION: ICD-10-CM

## 2021-11-29 NOTE — TELEPHONE ENCOUNTER
Caller: Giovanni Shin    Relationship: Self    Best call back number: [phone]    Requested Prescriptions:   Requested Prescriptions     Pending Prescriptions Disp Refills   • DULoxetine (CYMBALTA) 60 MG capsule 90 capsule 3     Sig: Take 1 capsule by mouth Daily.        Pharmacy where request should be sent: [id number] - [place]  [address] AT [facility] OF  [address] [facility] - [phone] [place] [phone] FX     Additional details provided by patient: PATIENT OUT OF MEDICATION     Does the patient have less than a 3 day supply:  [x] Yes  [] No    Radha Gao, [facility]   11/29/21 14:57 EST

## 2021-11-30 ENCOUNTER — TELEPHONE (OUTPATIENT)
Dept: FAMILY MEDICINE CLINIC | Facility: CLINIC | Age: 51
End: 2021-11-30

## 2021-11-30 DIAGNOSIS — F32.A ANXIETY AND DEPRESSION: ICD-10-CM

## 2021-11-30 DIAGNOSIS — F41.9 ANXIETY AND DEPRESSION: ICD-10-CM

## 2021-11-30 RX ORDER — DULOXETIN HYDROCHLORIDE 60 MG/1
60 CAPSULE, DELAYED RELEASE ORAL DAILY
Qty: 90 CAPSULE | Refills: 3 | Status: SHIPPED | OUTPATIENT
Start: 2021-11-30 | End: 2022-02-15 | Stop reason: SDUPTHER

## 2021-11-30 RX ORDER — DULOXETIN HYDROCHLORIDE 60 MG/1
60 CAPSULE, DELAYED RELEASE ORAL DAILY
Qty: 90 CAPSULE | Refills: 3 | Status: SHIPPED | OUTPATIENT
Start: 2021-11-30 | End: 2021-11-30 | Stop reason: SDUPTHER

## 2021-11-30 NOTE — TELEPHONE ENCOUNTER
PATIENT CALLING IN REGARDS TO REQUEST HER MEDICATION DULoxetine (CYMBALTA) 60 MG capsule GO TO     [facility] [address] [place], [place] [address], [place] - [address] [facility] [address]  - [phone] [facility] [phone] FX

## 2021-12-09 ENCOUNTER — DOCUMENTATION (OUTPATIENT)
Dept: PHYSICAL THERAPY | Facility: CLINIC | Age: 51
End: 2021-12-09

## 2021-12-09 DIAGNOSIS — M54.50 CHRONIC BILATERAL LOW BACK PAIN WITHOUT SCIATICA: ICD-10-CM

## 2021-12-09 DIAGNOSIS — R29.898 LEG WEAKNESS, BILATERAL: ICD-10-CM

## 2021-12-09 DIAGNOSIS — M54.16 RADICULOPATHY, LUMBAR REGION: Primary | ICD-10-CM

## 2021-12-09 DIAGNOSIS — G89.29 CHRONIC BILATERAL LOW BACK PAIN WITHOUT SCIATICA: ICD-10-CM

## 2021-12-09 NOTE — PROGRESS NOTES
Discharge Summary  Discharge Summary from Physical Therapy Report      Dates  PT visit: 8/3/2021 - 9/29/2021  Number of Visits: 13     Discharge Status of Patient: See MD Note dated 9/29/2021    Goals: Partially Met    Discharge Plan: Continue with current home exercise program as instructed    Comments     Date of Discharge 12/9/2021        Rosemary Bro, PT  Physical Therapist

## 2021-12-15 ENCOUNTER — TELEPHONE (OUTPATIENT)
Dept: FAMILY MEDICINE CLINIC | Facility: CLINIC | Age: 51
End: 2021-12-15

## 2021-12-15 ENCOUNTER — OFFICE VISIT (OUTPATIENT)
Dept: FAMILY MEDICINE CLINIC | Facility: CLINIC | Age: 51
End: 2021-12-15

## 2021-12-15 VITALS
DIASTOLIC BLOOD PRESSURE: 80 MMHG | HEART RATE: 78 BPM | RESPIRATION RATE: 18 BRPM | WEIGHT: 198.8 LBS | OXYGEN SATURATION: 98 % | BODY MASS INDEX: 33.94 KG/M2 | SYSTOLIC BLOOD PRESSURE: 130 MMHG | TEMPERATURE: 97.5 F | HEIGHT: 64 IN

## 2021-12-15 DIAGNOSIS — B34.9 VIRAL SYNDROME: ICD-10-CM

## 2021-12-15 DIAGNOSIS — Z87.891 HISTORY OF TOBACCO USE: ICD-10-CM

## 2021-12-15 DIAGNOSIS — R05.9 COUGH: Primary | ICD-10-CM

## 2021-12-15 DIAGNOSIS — D68.51 HETEROZYGOUS FACTOR V LEIDEN MUTATION: ICD-10-CM

## 2021-12-15 DIAGNOSIS — E66.09 CLASS 1 OBESITY DUE TO EXCESS CALORIES WITH SERIOUS COMORBIDITY AND BODY MASS INDEX (BMI) OF 34.0 TO 34.9 IN ADULT: ICD-10-CM

## 2021-12-15 DIAGNOSIS — I10 HYPERTENSION, BENIGN: ICD-10-CM

## 2021-12-15 PROCEDURE — 99213 OFFICE O/P EST LOW 20 MIN: CPT | Performed by: FAMILY MEDICINE

## 2021-12-15 RX ORDER — BENZONATATE 100 MG/1
100 CAPSULE ORAL 3 TIMES DAILY PRN
Qty: 30 CAPSULE | Refills: 2 | Status: SHIPPED | OUTPATIENT
Start: 2021-12-15 | End: 2023-03-22

## 2021-12-15 RX ORDER — WARFARIN SODIUM 7.5 MG/1
7.5 TABLET ORAL
COMMUNITY
End: 2023-03-22

## 2021-12-16 LAB
LABCORP SARS-COV-2, NAA 2 DAY TAT: NORMAL
SARS-COV-2 RNA RESP QL NAA+PROBE: NOT DETECTED

## 2022-01-05 PROBLEM — E66.811 CLASS 1 OBESITY DUE TO EXCESS CALORIES WITH SERIOUS COMORBIDITY AND BODY MASS INDEX (BMI) OF 34.0 TO 34.9 IN ADULT: Status: ACTIVE | Noted: 2019-09-10

## 2022-01-05 PROBLEM — E66.09 CLASS 1 OBESITY DUE TO EXCESS CALORIES WITH SERIOUS COMORBIDITY AND BODY MASS INDEX (BMI) OF 34.0 TO 34.9 IN ADULT: Status: ACTIVE | Noted: 2019-09-10

## 2022-02-15 ENCOUNTER — TELEPHONE (OUTPATIENT)
Dept: FAMILY MEDICINE CLINIC | Facility: CLINIC | Age: 52
End: 2022-02-15

## 2022-02-15 DIAGNOSIS — F41.9 ANXIETY AND DEPRESSION: ICD-10-CM

## 2022-02-15 DIAGNOSIS — F32.A ANXIETY AND DEPRESSION: ICD-10-CM

## 2022-02-15 DIAGNOSIS — I10 HYPERTENSION, BENIGN: ICD-10-CM

## 2022-02-15 RX ORDER — LOSARTAN POTASSIUM 25 MG/1
25 TABLET ORAL DAILY
Qty: 90 TABLET | Refills: 3 | Status: SHIPPED | OUTPATIENT
Start: 2022-02-15 | End: 2022-05-26 | Stop reason: DRUGHIGH

## 2022-02-15 RX ORDER — DULOXETIN HYDROCHLORIDE 60 MG/1
60 CAPSULE, DELAYED RELEASE ORAL DAILY
Qty: 90 CAPSULE | Refills: 3 | Status: SHIPPED | OUTPATIENT
Start: 2022-02-15 | End: 2022-11-07 | Stop reason: SDUPTHER

## 2022-02-15 NOTE — TELEPHONE ENCOUNTER
Caller: Giovanni Shin    Relationship: Self    Best call back number: [phone]    Requested Prescriptions:   Requested Prescriptions     Pending Prescriptions Disp Refills   • DULoxetine (CYMBALTA) 60 MG capsule 90 capsule 3     Sig: Take 1 capsule by mouth Daily.   • losartan (COZAAR) 25 MG tablet 90 tablet 3     Sig: Take 1 tablet by mouth Daily.        Pharmacy where request should be sent: [facility] [address] [place] [address] [address] [facility] [address] [facility] [phone] [facility] [phone] FX     Additional details provided by patient: HAS ABOUT 5 DAYS OF MEDS LEFT     Does the patient have less than a 3 day supply:  [] Yes  [x] No    Zaida Starr Rep   02/15/22 16:17 EST

## 2022-02-19 DIAGNOSIS — I10 HYPERTENSION, BENIGN: ICD-10-CM

## 2022-02-21 DIAGNOSIS — F32.A ANXIETY AND DEPRESSION: ICD-10-CM

## 2022-02-21 DIAGNOSIS — I10 HYPERTENSION, BENIGN: ICD-10-CM

## 2022-02-21 DIAGNOSIS — F41.9 ANXIETY AND DEPRESSION: ICD-10-CM

## 2022-02-21 RX ORDER — ATENOLOL 25 MG/1
25 TABLET ORAL DAILY
Qty: 90 TABLET | Refills: 3 | OUTPATIENT
Start: 2022-02-21

## 2022-02-21 RX ORDER — ATENOLOL 25 MG/1
25 TABLET ORAL DAILY
Qty: 90 TABLET | Refills: 3 | Status: SHIPPED | OUTPATIENT
Start: 2022-02-21 | End: 2022-09-28 | Stop reason: SDUPTHER

## 2022-02-21 NOTE — TELEPHONE ENCOUNTER
Caller: [facility]    Relationship:      Best call back number: [phone]  Requested Prescriptions:   Requested Prescriptions     Pending Prescriptions Disp Refills   • DULoxetine (CYMBALTA) 60 MG capsule 90 capsule 3     Sig: Take 1 capsule by mouth Daily.        Pharmacy where request should be sent: [facility] [address] - [phone]  - [phone] FX       Chuck Parmar   02/21/22 17:25 EST

## 2022-02-22 RX ORDER — DULOXETIN HYDROCHLORIDE 60 MG/1
60 CAPSULE, DELAYED RELEASE ORAL DAILY
Qty: 90 CAPSULE | Refills: 3 | OUTPATIENT
Start: 2022-02-22

## 2022-02-24 ENCOUNTER — TELEPHONE (OUTPATIENT)
Dept: FAMILY MEDICINE CLINIC | Facility: CLINIC | Age: 52
End: 2022-02-24

## 2022-02-24 NOTE — TELEPHONE ENCOUNTER
[facility] called requesting a refill of Cymbalta 20mg to be sent to them. Please advise.

## 2022-05-24 ENCOUNTER — TELEPHONE (OUTPATIENT)
Dept: FAMILY MEDICINE CLINIC | Facility: CLINIC | Age: 52
End: 2022-05-24

## 2022-05-24 NOTE — TELEPHONE ENCOUNTER
Giovanni called  reports blood pressure  125/88-----------134/90,   today with fullness in head x 2 weeks, question if any changes should be made in medications.Per Dr Eisenberg  continue to monitor blood pressure  and continue medications as ordered, lets see ho blood pressure is in 24 hours.

## 2022-05-24 NOTE — TELEPHONE ENCOUNTER
PATIENT CALLED TO DISCUSS HER BLOOD PRESSURE READINGS     ONE DAY IT WAS:  128/86     ANOTHER DAY:  138/92    LAST WK AT DR SHAVER'S 139/94     WHEN STANDING UP, SHE FEELS A PRESSURE ON HER     HAVING MORE HEADACHES RECENTLY    NOTICED  PAIN IN HER CHEST ONE DAY LAST WEEK     NO NUMBNESS IN ARM  NO JAW PAIN   POSSIBLE EAR INFECTION IN BOTH       PLEASE CALL AND ADVISE  Giovanni Shin (Self) [phone] (H)

## 2022-05-26 DIAGNOSIS — I10 HYPERTENSION, BENIGN: ICD-10-CM

## 2022-05-26 RX ORDER — LOSARTAN POTASSIUM 50 MG/1
50 TABLET ORAL DAILY
Qty: 30 TABLET | Refills: 12 | Status: SHIPPED | OUTPATIENT
Start: 2022-05-26 | End: 2023-03-07 | Stop reason: SDUPTHER

## 2022-05-26 NOTE — TELEPHONE ENCOUNTER
Spoke with Giovanni, per Dr Eisenberg  increase losartan from 25 mg daily to 50 mg daily and continue atenolol 25 mg daily. Continue to monitor blood pressure, keep log call  with readings in 2 weeks, or sooner if problems.

## 2022-09-28 DIAGNOSIS — I10 HYPERTENSION, BENIGN: ICD-10-CM

## 2022-09-28 NOTE — TELEPHONE ENCOUNTER
Caller: Giovanni Shin    Relationship: Self    Best call back number: [phone]    Requested Prescriptions:   Requested Prescriptions     Pending Prescriptions Disp Refills   • atenolol (TENORMIN) 25 MG tablet 90 tablet 3     Sig: Take 1 tablet by mouth Daily.        Pharmacy where request should be sent: [facility] #[id number] - [place]  [address] [facility] AT [facility] OF  & [address] [facility] - [phone]  - [phone] FX     Additional details provided by patient: PATIENT STATES SHE IS SUPPOSED TO BE TAKING 50 MG OF THE ATENOLOL .     PATIENT STATES SHE IS COMPLETELY OUT OF THIS MEDICATION.     Does the patient have less than a 3 day supply:  [x] Yes  [] No    Zaida Toro Rep   09/28/22 15:55 EDT

## 2022-09-29 RX ORDER — ATENOLOL 25 MG/1
25 TABLET ORAL DAILY
Qty: 90 TABLET | Refills: 3 | Status: SHIPPED | OUTPATIENT
Start: 2022-09-29 | End: 2022-10-04 | Stop reason: SDUPTHER

## 2022-10-04 DIAGNOSIS — I10 HYPERTENSION, BENIGN: ICD-10-CM

## 2022-10-04 RX ORDER — ATENOLOL 50 MG/1
50 TABLET ORAL DAILY
Qty: 90 TABLET | Refills: 3 | Status: SHIPPED | OUTPATIENT
Start: 2022-10-04 | End: 2023-03-22 | Stop reason: SDUPTHER

## 2022-11-07 DIAGNOSIS — F32.A ANXIETY AND DEPRESSION: ICD-10-CM

## 2022-11-07 DIAGNOSIS — F41.9 ANXIETY AND DEPRESSION: ICD-10-CM

## 2022-11-07 DIAGNOSIS — K21.9 GASTROESOPHAGEAL REFLUX DISEASE WITHOUT ESOPHAGITIS: ICD-10-CM

## 2022-11-07 RX ORDER — DULOXETIN HYDROCHLORIDE 60 MG/1
60 CAPSULE, DELAYED RELEASE ORAL DAILY
Qty: 90 CAPSULE | Refills: 3 | Status: SHIPPED | OUTPATIENT
Start: 2022-11-07 | End: 2023-03-22 | Stop reason: SDUPTHER

## 2022-11-07 RX ORDER — RABEPRAZOLE SODIUM 20 MG/1
20 TABLET, DELAYED RELEASE ORAL DAILY PRN
Qty: 90 TABLET | Refills: 4 | Status: SHIPPED | OUTPATIENT
Start: 2022-11-07

## 2023-03-08 RX ORDER — LOSARTAN POTASSIUM 50 MG/1
50 TABLET ORAL DAILY
Qty: 30 TABLET | Refills: 0 | Status: SHIPPED | OUTPATIENT
Start: 2023-03-08 | End: 2023-03-08

## 2023-03-08 RX ORDER — LOSARTAN POTASSIUM 50 MG/1
50 TABLET ORAL DAILY
Qty: 30 TABLET | Refills: 0 | Status: SHIPPED | OUTPATIENT
Start: 2023-03-08 | End: 2023-03-22

## 2023-03-08 NOTE — TELEPHONE ENCOUNTER
Left message for patient to call back to schedule annual physical. We haven't seen her since 12/2021, we need to see her to continue refilling medications.

## 2023-03-20 RX ORDER — MONTELUKAST SODIUM 10 MG/1
10 TABLET ORAL NIGHTLY
COMMUNITY
Start: 2023-02-21

## 2023-03-20 RX ORDER — LOSARTAN POTASSIUM 25 MG/1
1 TABLET ORAL DAILY
COMMUNITY
Start: 2023-01-24 | End: 2023-03-22 | Stop reason: SDUPTHER

## 2023-03-20 RX ORDER — RIVAROXABAN 20 MG/1
1 TABLET, FILM COATED ORAL DAILY
COMMUNITY
Start: 2022-12-29

## 2023-03-20 RX ORDER — ALBUTEROL SULFATE 90 UG/1
AEROSOL, METERED RESPIRATORY (INHALATION)
COMMUNITY
Start: 2023-02-21

## 2023-03-20 RX ORDER — MOLNUPIRAVIR 200 MG/1
CAPSULE ORAL
COMMUNITY
Start: 2022-11-29 | End: 2023-03-22

## 2023-03-20 NOTE — PROGRESS NOTES
Chief Complaint  Annual Exam, Hypertension, Allergic Rhinitis, and Factor V defiency    Subjective     CC  Problem List  Visit Diagnosis   Encounters  Notes  Medications  Labs  Result Review Imaging  Media    Giovanni Shin presents to [facility] for an annual exam. The patient is here: for coordination of medical care to discuss health maintenance and disease prevention to follow up on multiple medical problems. Overall has: minimal activity with work/home activities, good appetite, feels well with minor complaints, decreased energy level and is sleeping poorly. Nutrition: appropriate balanced diet and eating a variety of foods. Last tetanus shot was . Giovanni Shin is -5, Parity-4. No LMP recorded (lmp unknown). Patient is perimenopausal. She is premenopausal.      History of Present Illness  Giovanni sufferes for AR, her sxs are mildly exacerbated, she is resuming meds. She is coughing with no SOA no wheezhing no fever or chills.     She suffers from factor V deviancy. She si compliant with warfarin for which she will take life long. Given more that one episode of c    Hypertension  This is a chronic problem. The current episode started more than 1 year ago. The problem has been waxing and waning since onset. The problem is controlled. Pertinent negatives include no chest pain, headaches, orthopnea, palpitations, PND or shortness of breath. Risk factors for coronary artery disease include post-menopausal state and family history. Current antihypertension treatment includes angiotensin blockers and beta blockers. The current treatment provides significant improvement. There are no compliance problems.  There is no history of angina, kidney disease, CAD/MI, heart failure, PVD or retinopathy. Identifiable causes of hypertension include hyperparathyroidism.   Heartburn  She reports no abdominal pain, no chest pain, no coughing, no nausea, no sore throat or no  "wheezing. This is a recurrent problem. The current episode started in the past 7 days. The problem occurs rarely. The problem has been gradually improving. Pertinent negatives include no fatigue or weight loss.       Review of Systems   Constitutional: Negative for activity change, appetite change, fatigue, fever, unexpected weight gain and unexpected weight loss.   HENT: Negative for congestion, ear pain, hearing loss, rhinorrhea, sinus pressure, sore throat, swollen glands, trouble swallowing and voice change.    Eyes: Negative for visual disturbance.   Respiratory: Negative for apnea, cough, shortness of breath and wheezing.    Cardiovascular: Negative for chest pain, palpitations, orthopnea and PND.   Gastrointestinal: Negative for abdominal pain, blood in stool, constipation, diarrhea, nausea, vomiting and indigestion.   Endocrine: Negative for cold intolerance, heat intolerance, polydipsia and polyuria.   Genitourinary: Negative for breast discharge, breast lump, breast pain, dysuria, flank pain, frequency, pelvic pain and vaginal bleeding.   Musculoskeletal: Negative for arthralgias, joint swelling and myalgias.   Skin: Negative for rash, skin lesions and wound.   Allergic/Immunologic: Negative for environmental allergies and immunocompromised state.   Neurological: Negative for dizziness, syncope, weakness, numbness, headache and memory problem.   Hematological: Negative for adenopathy. Does not bruise/bleed easily.   Psychiatric/Behavioral: Negative for suicidal ideas and depressed mood. The patient is not nervous/anxious.         Objective   Vital Signs:   /82 (BP Location: Right arm, Patient Position: Sitting, Cuff Size: Adult)   Pulse 94   Temp 96.8 °F (36 °C) (Temporal)   Resp 16   Ht 162.6 cm (64\")   Wt 102 kg (225 lb 6.4 oz)   SpO2 98% Comment: room air  BMI 38.69 kg/m²     Physical Exam  Constitutional:       General: She is not in acute distress.     Appearance: She is well-developed. "   HENT:      Head: Normocephalic. Hair is normal.      Right Ear: Tympanic membrane and external ear normal.      Left Ear: Tympanic membrane and external ear normal.      Nose: Nose normal. No mucosal edema.      Mouth/Throat:      Pharynx: Uvula midline.   Eyes:      General:         Right eye: No discharge.         Left eye: No discharge.      Conjunctiva/sclera: Conjunctivae normal.      Pupils: Pupils are equal, round, and reactive to light.   Neck:      Thyroid: No thyromegaly.      Vascular: No JVD.   Cardiovascular:      Rate and Rhythm: Normal rate and regular rhythm.      Chest Wall: PMI is not displaced.      Pulses:           Carotid pulses are 2+ on the right side and 2+ on the left side.       Femoral pulses are 2+ on the right side and 2+ on the left side.       Dorsalis pedis pulses are 2+ on the right side and 2+ on the left side.      Heart sounds: Normal heart sounds. No murmur heard.    No friction rub. No gallop.      Comments: Negative Homans' no edema  Pulmonary:      Effort: Pulmonary effort is normal. No respiratory distress.      Breath sounds: Normal breath sounds. No decreased breath sounds, wheezing, rhonchi or rales.   Chest:   Breasts:     Breasts are symmetrical.      Right: No inverted nipple, mass, nipple discharge, skin change or tenderness.      Left: No inverted nipple, mass, nipple discharge, skin change or tenderness.   Abdominal:      General: Bowel sounds are normal. There is no distension or abdominal bruit.      Palpations: Abdomen is soft. There is no mass.      Tenderness: There is no abdominal tenderness.   Musculoskeletal:         General: No tenderness or deformity. Normal range of motion.      Cervical back: Normal range of motion and neck supple. No muscular tenderness.   Lymphadenopathy:      Cervical: No cervical adenopathy.      Upper Body:      Right upper body: No supraclavicular adenopathy.      Left upper body: No supraclavicular adenopathy.   Skin:      General: Skin is warm and dry.      Findings: No ecchymosis, erythema, lesion or rash.   Neurological:      Mental Status: She is alert and oriented to person, place, and time.      Cranial Nerves: No cranial nerve deficit.      Sensory: No sensory deficit.      Motor: No abnormal muscle tone.      Coordination: Coordination normal.      Deep Tendon Reflexes: Reflexes are normal and symmetric. Reflexes normal.   Psychiatric:         Speech: Speech normal.         Behavior: Behavior normal.         Thought Content: Thought content normal. Thought content does not include suicidal ideation.         Cognition and Memory: She does not exhibit impaired recent memory or impaired remote memory.         Judgment: Judgment normal. Judgment is not impulsive.        Result Review :Labs  Result Review  Imaging  Med Tab  Media                 Assessment and Plan CC Problem List  Visit Diagnosis  ROS  Review (Popup)  Health Maintenance  Quality  BestPractice  Medications  SmartSets  SnapShot Encounters  Media  Problem List Items Addressed This Visit        High    Heterozygous factor V Leiden mutation (HCC)    Overview     Father and brother also positive.  She is followed by hemaatology, she has hx of PE, she remains on Warfarin   Followed by [facility]  She is on coumadin life long. [facility] hematology monitoring.          Relevant Medications    Xarelto 20 MG tablet    Hypertension, benign    Overview     Controlled she is compliant.         Current Assessment & Plan     Hypertension is improving with treatment.  Continue current treatment regimen.  Dietary sodium restriction.  Weight loss.  Regular aerobic exercise.  Blood pressure will be reassessed in 3 months.         Relevant Medications    losartan (COZAAR) 25 MG tablet    atenolol (TENORMIN) 50 MG tablet    Other Relevant Orders    CBC & Differential (Completed)    Comprehensive Metabolic Panel (Completed)    TSH (Completed)    Prediabetes    Overview     A1 c 5.7  03/22/2023  The importance of low carb diet, wt reduction and regular exercise understood. Repeat A1c in 3-6 mos             Medium    Gastroesophageal reflux disease without esophagitis    Overview     Controlled with prn OTC meds. Continued          Presence of 52 mg levonorgestrel-releasing intrauterine device (IUD)    Overview     For menorrhagia 2021 Lozano, No problems.             Low    Allergic rhinitis due to pollen    Overview     stable on meds refilled.          History of DVT (deep vein thrombosis)    Overview     LLE         History of pulmonary embolism    Overview     06/2017, Followed with Hematology, Factor V deficiency  She has had no recurrence on anti coagulation.           Lumbar disc disease with radiculopathy    Overview     Chronic intermittent pain. No longer in pain management.   Xray L3-L4 L4-L5 07/13/2021  anti inflammatories prn.          Class 2 severe obesity due to excess calories with serious comorbidity and body mass index (BMI) of 38.0 to 38.9 in adult (HCC)    Current Assessment & Plan     Patient's (Body mass index is 38.69 kg/m².) indicates that they are obese (BMI >30) with health conditions that include hypertension . Weight is improving with treatment. BMI  is above average; BMI management plan is completed. We discussed portion control and increasing exercise.          History of tobacco use    Overview     1 pack per week, x 10 years, she abstains            Unprioritized    Anxiety and depression    Overview     Sxs are improved on meds.          Relevant Medications    DULoxetine (CYMBALTA) 60 MG capsule    Encounter for annual general medical examination with abnormal findings in adult - Primary    Overview     Healthy Diet regular exercise sunscreen. Seat belts, breast self exams and general safety discussed.          Relevant Orders    Lipid Panel With / Chol / HDL Ratio (Completed)    POCT urinalysis dipstick, manual (Completed)    Encounter for screening mammogram  for malignant neoplasm of breast    Overview     Last mammogram 03/2021         Relevant Orders    Mammo Screening Digital Tomosynthesis Bilateral With CAD (Completed)    Screening for cervical cancer    Overview     Pap smear 2021 WNL HPV negative, Dr. Lozano         Asymptomatic menopausal state    Overview     Last Dexa 1.7 spine, 0.2 FN, 0.4 hip 03/2021         Screening for colon cancer    Overview     GSI colononscopy 2021 reported neg.             Follow Up Instructions Charge Capture  Follow-up Communications  No follow-ups on file.  Patient was given instructions and counseling regarding her condition or for health maintenance advice. Please see specific information pulled into the AVS if appropriate.      Site care done- cleaned with alcohol swab, procedure tolerated well, dressing applied. Venipuncture was obtained after 1 time(s). 3 tubes were drawn. Needle gauge used was 23-butterfly.

## 2023-03-22 ENCOUNTER — OFFICE VISIT (OUTPATIENT)
Dept: FAMILY MEDICINE CLINIC | Facility: CLINIC | Age: 53
End: 2023-03-22
Payer: COMMERCIAL

## 2023-03-22 VITALS
SYSTOLIC BLOOD PRESSURE: 124 MMHG | DIASTOLIC BLOOD PRESSURE: 82 MMHG | BODY MASS INDEX: 38.48 KG/M2 | OXYGEN SATURATION: 98 % | RESPIRATION RATE: 16 BRPM | WEIGHT: 225.4 LBS | HEIGHT: 64 IN | HEART RATE: 94 BPM | TEMPERATURE: 96.8 F

## 2023-03-22 DIAGNOSIS — Z86.711 HISTORY OF PULMONARY EMBOLISM: ICD-10-CM

## 2023-03-22 DIAGNOSIS — Z78.0 ASYMPTOMATIC MENOPAUSAL STATE: ICD-10-CM

## 2023-03-22 DIAGNOSIS — Z12.31 ENCOUNTER FOR SCREENING MAMMOGRAM FOR MALIGNANT NEOPLASM OF BREAST: ICD-10-CM

## 2023-03-22 DIAGNOSIS — Z87.891 HISTORY OF TOBACCO USE: ICD-10-CM

## 2023-03-22 DIAGNOSIS — E66.01 CLASS 2 SEVERE OBESITY DUE TO EXCESS CALORIES WITH SERIOUS COMORBIDITY AND BODY MASS INDEX (BMI) OF 38.0 TO 38.9 IN ADULT: ICD-10-CM

## 2023-03-22 DIAGNOSIS — Z86.718 HISTORY OF DVT (DEEP VEIN THROMBOSIS): ICD-10-CM

## 2023-03-22 DIAGNOSIS — F41.9 ANXIETY AND DEPRESSION: ICD-10-CM

## 2023-03-22 DIAGNOSIS — Z00.01 ENCOUNTER FOR ANNUAL GENERAL MEDICAL EXAMINATION WITH ABNORMAL FINDINGS IN ADULT: Primary | ICD-10-CM

## 2023-03-22 DIAGNOSIS — K21.9 GASTROESOPHAGEAL REFLUX DISEASE WITHOUT ESOPHAGITIS: ICD-10-CM

## 2023-03-22 DIAGNOSIS — Z12.11 SCREENING FOR COLON CANCER: ICD-10-CM

## 2023-03-22 DIAGNOSIS — J30.1 SEASONAL ALLERGIC RHINITIS DUE TO POLLEN: ICD-10-CM

## 2023-03-22 DIAGNOSIS — F32.A ANXIETY AND DEPRESSION: ICD-10-CM

## 2023-03-22 DIAGNOSIS — Z97.5 PRESENCE OF 52 MG LEVONORGESTREL-RELEASING INTRAUTERINE DEVICE (IUD): ICD-10-CM

## 2023-03-22 DIAGNOSIS — M51.16 LUMBAR DISC DISEASE WITH RADICULOPATHY: ICD-10-CM

## 2023-03-22 DIAGNOSIS — Z12.4 SCREENING FOR CERVICAL CANCER: ICD-10-CM

## 2023-03-22 DIAGNOSIS — R73.03 PREDIABETES: ICD-10-CM

## 2023-03-22 DIAGNOSIS — I10 HYPERTENSION, BENIGN: ICD-10-CM

## 2023-03-22 DIAGNOSIS — D68.51 HETEROZYGOUS FACTOR V LEIDEN MUTATION: ICD-10-CM

## 2023-03-22 PROBLEM — M47.9 SPONDYLOSIS: Status: RESOLVED | Noted: 2023-03-22 | Resolved: 2023-03-22

## 2023-03-22 PROBLEM — M48.00 SPINAL STENOSIS: Status: RESOLVED | Noted: 2023-03-22 | Resolved: 2023-03-22

## 2023-03-22 PROBLEM — I82.409 DEEP VEIN THROMBOSIS (DVT) OF LOWER EXTREMITY: Status: ACTIVE | Noted: 2023-03-22

## 2023-03-22 PROBLEM — B96.89 ACUTE BACTERIAL BRONCHITIS: Status: RESOLVED | Noted: 2021-10-24 | Resolved: 2023-03-22

## 2023-03-22 PROBLEM — G47.00 INSOMNIA: Status: RESOLVED | Noted: 2023-03-22 | Resolved: 2023-03-22

## 2023-03-22 PROBLEM — M47.9 SPONDYLOSIS: Status: ACTIVE | Noted: 2023-03-22

## 2023-03-22 PROBLEM — M19.012 PRIMARY OSTEOARTHRITIS OF LEFT SHOULDER: Status: RESOLVED | Noted: 2020-08-04 | Resolved: 2023-03-22

## 2023-03-22 PROBLEM — J20.8 ACUTE BACTERIAL BRONCHITIS: Status: RESOLVED | Noted: 2021-10-24 | Resolved: 2023-03-22

## 2023-03-22 PROBLEM — G47.00 INSOMNIA: Status: ACTIVE | Noted: 2023-03-22

## 2023-03-22 PROBLEM — I82.409 DEEP VEIN THROMBOSIS (DVT) OF LOWER EXTREMITY (HCC): Status: RESOLVED | Noted: 2023-03-22 | Resolved: 2023-03-22

## 2023-03-22 PROBLEM — M48.00 SPINAL STENOSIS: Status: ACTIVE | Noted: 2023-03-22

## 2023-03-22 PROBLEM — I26.99 PULMONARY EMBOLISM: Status: ACTIVE | Noted: 2023-03-22

## 2023-03-22 PROBLEM — N92.4 ABNORMAL PERIMENOPAUSAL BLEEDING: Status: RESOLVED | Noted: 2020-05-19 | Resolved: 2023-03-22

## 2023-03-22 PROBLEM — I26.99 PULMONARY EMBOLISM: Status: RESOLVED | Noted: 2023-03-22 | Resolved: 2023-03-22

## 2023-03-22 LAB
BILIRUB BLD-MCNC: NEGATIVE MG/DL
CLARITY, POC: CLEAR
COLOR UR: ABNORMAL
GLUCOSE UR STRIP-MCNC: NEGATIVE MG/DL
KETONES UR QL: NEGATIVE
LEUKOCYTE EST, POC: ABNORMAL
NITRITE UR-MCNC: NEGATIVE MG/ML
PH UR: 5 [PH] (ref 5–8)
PROT UR STRIP-MCNC: ABNORMAL MG/DL
RBC # UR STRIP: NEGATIVE /UL
SP GR UR: 1.02 (ref 1–1.03)
UROBILINOGEN UR QL: NORMAL

## 2023-03-22 RX ORDER — ATENOLOL 50 MG/1
50 TABLET ORAL DAILY
Qty: 90 TABLET | Refills: 3 | Status: SHIPPED | OUTPATIENT
Start: 2023-03-22

## 2023-03-22 RX ORDER — ATENOLOL 25 MG/1
1 TABLET ORAL DAILY
COMMUNITY
Start: 2022-12-24 | End: 2023-03-22

## 2023-03-22 RX ORDER — LOSARTAN POTASSIUM 25 MG/1
25 TABLET ORAL DAILY
Qty: 90 TABLET | Refills: 3 | Status: SHIPPED | OUTPATIENT
Start: 2023-03-22

## 2023-03-22 RX ORDER — DULOXETIN HYDROCHLORIDE 60 MG/1
60 CAPSULE, DELAYED RELEASE ORAL DAILY
Qty: 90 CAPSULE | Refills: 3 | Status: SHIPPED | OUTPATIENT
Start: 2023-03-22

## 2023-03-22 NOTE — ASSESSMENT & PLAN NOTE
Patient's (Body mass index is 38.69 kg/m².) indicates that they are obese (BMI >30) with health conditions that include hypertension . Weight is improving with treatment. BMI  is above average; BMI management plan is completed. We discussed portion control and increasing exercise.

## 2023-03-23 LAB
ALBUMIN SERPL-MCNC: 4.7 G/DL (ref 3.8–4.9)
ALBUMIN/GLOB SERPL: 1.8 {RATIO} (ref 1.2–2.2)
ALP SERPL-CCNC: 101 IU/L (ref 44–121)
ALT SERPL-CCNC: 22 IU/L (ref 0–32)
AST SERPL-CCNC: 21 IU/L (ref 0–40)
BASOPHILS # BLD AUTO: 0.1 X10E3/UL (ref 0–0.2)
BASOPHILS NFR BLD AUTO: 1 %
BILIRUB SERPL-MCNC: 1 MG/DL (ref 0–1.2)
BUN SERPL-MCNC: 10 MG/DL (ref 6–24)
BUN/CREAT SERPL: 12 (ref 9–23)
CALCIUM SERPL-MCNC: 9.7 MG/DL (ref 8.7–10.2)
CHLORIDE SERPL-SCNC: 102 MMOL/L (ref 96–106)
CHOLEST SERPL-MCNC: 233 MG/DL (ref 100–199)
CHOLEST/HDLC SERPL: 4.2 RATIO (ref 0–4.4)
CO2 SERPL-SCNC: 24 MMOL/L (ref 20–29)
CREAT SERPL-MCNC: 0.82 MG/DL (ref 0.57–1)
EGFRCR SERPLBLD CKD-EPI 2021: 84 ML/MIN/1.73
EOSINOPHIL # BLD AUTO: 0.1 X10E3/UL (ref 0–0.4)
EOSINOPHIL NFR BLD AUTO: 2 %
ERYTHROCYTE [DISTWIDTH] IN BLOOD BY AUTOMATED COUNT: 12.4 % (ref 11.7–15.4)
GLOBULIN SER CALC-MCNC: 2.6 G/DL (ref 1.5–4.5)
GLUCOSE SERPL-MCNC: 111 MG/DL (ref 70–99)
HCT VFR BLD AUTO: 46.3 % (ref 34–46.6)
HDLC SERPL-MCNC: 56 MG/DL
HGB BLD-MCNC: 16 G/DL (ref 11.1–15.9)
IMM GRANULOCYTES # BLD AUTO: 0 X10E3/UL (ref 0–0.1)
IMM GRANULOCYTES NFR BLD AUTO: 0 %
LDLC SERPL CALC-MCNC: 150 MG/DL (ref 0–99)
LYMPHOCYTES # BLD AUTO: 2.2 X10E3/UL (ref 0.7–3.1)
LYMPHOCYTES NFR BLD AUTO: 32 %
MCH RBC QN AUTO: 32.1 PG (ref 26.6–33)
MCHC RBC AUTO-ENTMCNC: 34.6 G/DL (ref 31.5–35.7)
MCV RBC AUTO: 93 FL (ref 79–97)
MONOCYTES # BLD AUTO: 0.5 X10E3/UL (ref 0.1–0.9)
MONOCYTES NFR BLD AUTO: 7 %
NEUTROPHILS # BLD AUTO: 4 X10E3/UL (ref 1.4–7)
NEUTROPHILS NFR BLD AUTO: 58 %
PLATELET # BLD AUTO: 221 X10E3/UL (ref 150–450)
POTASSIUM SERPL-SCNC: 4.3 MMOL/L (ref 3.5–5.2)
PROT SERPL-MCNC: 7.3 G/DL (ref 6–8.5)
RBC # BLD AUTO: 4.98 X10E6/UL (ref 3.77–5.28)
SODIUM SERPL-SCNC: 141 MMOL/L (ref 134–144)
TRIGL SERPL-MCNC: 148 MG/DL (ref 0–149)
TSH SERPL DL<=0.005 MIU/L-ACNC: 0.71 UIU/ML (ref 0.45–4.5)
VLDLC SERPL CALC-MCNC: 27 MG/DL (ref 5–40)
WBC # BLD AUTO: 6.9 X10E3/UL (ref 3.4–10.8)

## 2023-03-24 LAB — HBA1C MFR BLD: 5.9 % (ref 4.8–5.6)

## 2023-03-25 LAB — SPECIMEN STATUS: NORMAL

## 2023-04-07 ENCOUNTER — HOSPITAL ENCOUNTER (OUTPATIENT)
Dept: MAMMOGRAPHY | Facility: HOSPITAL | Age: 53
Discharge: HOME | End: 2023-04-07
Admitting: FAMILY MEDICINE
Payer: COMMERCIAL

## 2023-04-07 DIAGNOSIS — Z12.31 ENCOUNTER FOR SCREENING MAMMOGRAM FOR MALIGNANT NEOPLASM OF BREAST: ICD-10-CM

## 2023-04-07 PROCEDURE — 77067 SCR MAMMO BI INCL CAD: CPT

## 2023-04-07 PROCEDURE — 77063 BREAST TOMOSYNTHESIS BI: CPT

## 2023-04-09 PROBLEM — R73.03 PREDIABETES: Status: ACTIVE | Noted: 2023-04-09

## 2023-06-06 PROBLEM — M50.30 DDD (DEGENERATIVE DISC DISEASE), CERVICAL: Status: ACTIVE | Noted: 2023-06-06

## 2023-10-02 DIAGNOSIS — K21.9 GASTROESOPHAGEAL REFLUX DISEASE WITHOUT ESOPHAGITIS: ICD-10-CM

## 2023-10-02 RX ORDER — RABEPRAZOLE SODIUM 20 MG/1
20 TABLET, DELAYED RELEASE ORAL DAILY PRN
Qty: 90 TABLET | Refills: 4 | Status: SHIPPED | OUTPATIENT
Start: 2023-10-02

## 2023-10-02 RX ORDER — RABEPRAZOLE SODIUM 20 MG/1
20 TABLET, DELAYED RELEASE ORAL DAILY PRN
Qty: 90 TABLET | Refills: 4 | Status: SHIPPED | OUTPATIENT
Start: 2023-10-02 | End: 2023-10-02 | Stop reason: SDUPTHER

## 2023-10-06 RX ORDER — LOSARTAN POTASSIUM 25 MG/1
25 TABLET ORAL DAILY
Qty: 90 TABLET | Refills: 3 | Status: SHIPPED | OUTPATIENT
Start: 2023-10-06

## 2023-11-22 DIAGNOSIS — F32.A ANXIETY AND DEPRESSION: ICD-10-CM

## 2023-11-22 DIAGNOSIS — F41.9 ANXIETY AND DEPRESSION: ICD-10-CM

## 2023-11-22 RX ORDER — DULOXETIN HYDROCHLORIDE 60 MG/1
60 CAPSULE, DELAYED RELEASE ORAL DAILY
Qty: 90 CAPSULE | Refills: 0 | Status: SHIPPED | OUTPATIENT
Start: 2023-11-22

## 2023-12-11 NOTE — PROGRESS NOTES
Chief Complaint  Blood Sugar Problem, Hypertension, and Obesity    Subjective     CC  Problem List  Visit Diagnosis   Encounters  Notes  Medications  Labs  Result Review Imaging  Media    Giovanni Shin presents to [facility] for   History of Present Illness      Hypertension  This is a chronic problem. The current episode started more than 1 year ago. The problem has been waxing and waning since onset. The problem is controlled. Associated symptoms include anxiety, malaise/fatigue, peripheral edema and sweats. Pertinent negatives include no chest pain, headaches, neck pain, orthopnea, palpitations, PND or shortness of breath. Risk factors for coronary artery disease include post-menopausal state and family history. Current antihypertension treatment includes angiotensin blockers and beta blockers. The current treatment provides significant improvement. There are no compliance problems.  There is no history of angina, kidney disease, CAD/MI, heart failure, PVD or retinopathy. Identifiable causes of hypertension include hyperparathyroidism.   Blood Sugar Problem  This is a new problem. The current episode started more than 1 month ago. Associated symptoms include chills, congestion, coughing, diaphoresis and fatigue. Pertinent negatives include no change in bowel habit, chest pain, fever, headaches, nausea, neck pain, numbness, rash, sore throat, visual change, vomiting or weakness. The symptoms are aggravated by eating. She has tried nothing for the symptoms.       Review of Systems   Constitutional:  Positive for chills, diaphoresis, fatigue and malaise/fatigue. Negative for fever.   HENT:  Positive for congestion. Negative for sore throat.    Respiratory:  Positive for cough. Negative for shortness of breath.    Cardiovascular:  Negative for chest pain, palpitations, orthopnea and PND.   Gastrointestinal:  Negative for change in bowel habit, nausea and vomiting.  "  Endocrine: Negative for cold intolerance, heat intolerance and polyuria.   Musculoskeletal:  Negative for neck pain.   Skin:  Negative for rash.   Neurological:  Negative for weakness and numbness.   Hematological:  Negative for adenopathy. Does not bruise/bleed easily.        Objective   Vital Signs:   /80   Pulse 102   Temp 97.5 °F (36.4 °C) (Infrared)   Resp 20   Ht 167.6 cm (66\")   Wt 103 kg (226 lb 12.8 oz)   SpO2 98% Comment: room air  BMI 36.61 kg/m²     Physical Exam  Constitutional:       General: She is not in acute distress.  HENT:      Right Ear: Tympanic membrane normal.      Left Ear: Tympanic membrane normal.   Neck:      Thyroid: No thyromegaly.      Vascular: No JVD.   Cardiovascular:      Rate and Rhythm: Normal rate and regular rhythm.      Heart sounds: No murmur heard.  Pulmonary:      Effort: Pulmonary effort is normal.      Breath sounds: Normal breath sounds. No wheezing.   Skin:     Findings: No rash.   Neurological:      Mental Status: She is alert.        Result Review :Labs  Result Review  Imaging  Med Tab  Media                 Assessment and Plan CC Problem List  Visit Diagnosis  ROS  Review (Popup)  Health Maintenance  Quality  BestPractice  Medications  SmartSets  SnapShot Encounters  Media  Problem List Items Addressed This Visit          High    Hypertension, benign    Overview     Controlled she is compliant.         Current Assessment & Plan     Hypertension is improving with treatment.  Continue current treatment regimen.  Continue current medications.  Blood pressure will be reassessed in 3 months.         Relevant Medications    losartan (COZAAR) 50 MG tablet    Prediabetes - Primary    Overview     A1c 6.3 12/12/2023 worse wt increased 5 lb  A1c 5.1 06/07/2023 much improved. 5 lb wt loss she is contgratulated.   A1 c 5.7 03/22/2023  The importance of low carb diet, wt reduction and regular exercise understood. Repeat A1c in 3-6 mos          " Relevant Orders    POC Glycosylated Hemoglobin (Hb A1C) (Completed)    POCT Glucose (Completed)    POCT urinalysis dipstick, manual (Completed)       Low    Class 2 severe obesity due to excess calories with serious comorbidity and body mass index (BMI) of 36.0 to 36.9 in adult    Current Assessment & Plan     Patient's (Body mass index is 36.61 kg/m².) indicates that they are obese (BMI >30) with health conditions that include hypertension and dyslipidemias . Weight is improving with lifestyle modifications. BMI  is above average; BMI management plan is completed. We discussed portion control and increasing exercise.          History of tobacco use    Overview     1 pack per week, x 10 years, she abstains            Follow Up Instructions Charge Capture  Follow-up Communications  Return in about 3 months (around 3/12/2024).  Patient was given instructions and counseling regarding her condition or for health maintenance advice. Please see specific information pulled into the AVS if appropriate.   Answers submitted by the patient for this visit:  Other (Submitted on 12/5/2023)  Please describe your symptoms.: Follow up on pre-diabetes  Have you had these symptoms before?: Yes  How long have you been having these symptoms?: Greater than 2 weeks  Please list any medications you are currently taking for this condition.: None  Primary Reason for Visit (Submitted on 12/5/2023)  What is the primary reason for your visit?: Other

## 2023-12-12 ENCOUNTER — OFFICE VISIT (OUTPATIENT)
Dept: FAMILY MEDICINE CLINIC | Facility: CLINIC | Age: 53
End: 2023-12-12
Payer: COMMERCIAL

## 2023-12-12 VITALS
TEMPERATURE: 97.5 F | RESPIRATION RATE: 20 BRPM | HEIGHT: 66 IN | SYSTOLIC BLOOD PRESSURE: 120 MMHG | HEART RATE: 102 BPM | OXYGEN SATURATION: 98 % | WEIGHT: 226.8 LBS | BODY MASS INDEX: 36.45 KG/M2 | DIASTOLIC BLOOD PRESSURE: 80 MMHG

## 2023-12-12 DIAGNOSIS — I10 HYPERTENSION, BENIGN: ICD-10-CM

## 2023-12-12 DIAGNOSIS — R73.03 PREDIABETES: Primary | ICD-10-CM

## 2023-12-12 DIAGNOSIS — Z87.891 HISTORY OF TOBACCO USE: ICD-10-CM

## 2023-12-12 DIAGNOSIS — E66.01 CLASS 2 SEVERE OBESITY DUE TO EXCESS CALORIES WITH SERIOUS COMORBIDITY AND BODY MASS INDEX (BMI) OF 36.0 TO 36.9 IN ADULT: ICD-10-CM

## 2023-12-12 LAB
BILIRUB BLD-MCNC: NEGATIVE MG/DL
CLARITY, POC: CLEAR
COLOR UR: YELLOW
EXPIRATION DATE: ABNORMAL
GLUCOSE BLDC GLUCOMTR-MCNC: 94 MG/DL (ref 70–130)
GLUCOSE UR STRIP-MCNC: NEGATIVE MG/DL
HBA1C MFR BLD: 6.3 % (ref 4.5–5.7)
KETONES UR QL: NEGATIVE
LEUKOCYTE EST, POC: NEGATIVE
Lab: ABNORMAL
NITRITE UR-MCNC: NEGATIVE MG/ML
PH UR: 5 [PH] (ref 5–8)
PROT UR STRIP-MCNC: NEGATIVE MG/DL
RBC # UR STRIP: NEGATIVE /UL
SP GR UR: 1.02 (ref 1–1.03)
UROBILINOGEN UR QL: NORMAL

## 2023-12-12 RX ORDER — LOSARTAN POTASSIUM 50 MG/1
50 TABLET ORAL DAILY
Qty: 90 TABLET | Refills: 3 | Status: SHIPPED | OUTPATIENT
Start: 2023-12-12

## 2023-12-19 DIAGNOSIS — E66.01 CLASS 2 SEVERE OBESITY DUE TO EXCESS CALORIES WITH SERIOUS COMORBIDITY AND BODY MASS INDEX (BMI) OF 38.0 TO 38.9 IN ADULT: Primary | ICD-10-CM

## 2023-12-20 ENCOUNTER — TELEPHONE (OUTPATIENT)
Dept: FAMILY MEDICINE CLINIC | Facility: CLINIC | Age: 53
End: 2023-12-20

## 2023-12-20 NOTE — TELEPHONE ENCOUNTER
Caller: Giovanni Shin    Relationship to patient: Self    Best call back number: [phone]      Patient is needing: PATIENT STATES THAT [facility] INFORMED HER THAT A PRIOR AUTHORIZATION IS NEEDED FOR SAXENDA MEDICATION.    SHE REQUESTS THAT DR. ARAYA EZEQUIEL SUBMIT A PRIOR AUTHORIZATION.  SHE ALSO STATES THAT SHE HAS MET HER DEDUCTIBLE FOR THIS YEAR AND REQUESTS MORE THAN ONE PEN ON THIS PRESCRIPTION.    PLEASE ADVISE.

## 2023-12-28 DIAGNOSIS — F32.A ANXIETY AND DEPRESSION: ICD-10-CM

## 2023-12-28 DIAGNOSIS — F41.9 ANXIETY AND DEPRESSION: ICD-10-CM

## 2023-12-29 RX ORDER — DULOXETIN HYDROCHLORIDE 60 MG/1
60 CAPSULE, DELAYED RELEASE ORAL DAILY
Qty: 90 CAPSULE | Refills: 3 | Status: SHIPPED | OUTPATIENT
Start: 2023-12-29

## 2023-12-31 NOTE — ASSESSMENT & PLAN NOTE
Patient's (Body mass index is 36.61 kg/m².) indicates that they are obese (BMI >30) with health conditions that include hypertension and dyslipidemias . Weight is improving with lifestyle modifications. BMI  is above average; BMI management plan is completed. We discussed portion control and increasing exercise.

## 2024-02-23 ENCOUNTER — OFFICE VISIT (OUTPATIENT)
Dept: FAMILY MEDICINE CLINIC | Facility: CLINIC | Age: 54
End: 2024-02-23
Payer: COMMERCIAL

## 2024-02-23 VITALS
TEMPERATURE: 98 F | WEIGHT: 221.8 LBS | BODY MASS INDEX: 35.65 KG/M2 | RESPIRATION RATE: 18 BRPM | HEIGHT: 66 IN | HEART RATE: 93 BPM | OXYGEN SATURATION: 100 % | DIASTOLIC BLOOD PRESSURE: 84 MMHG | SYSTOLIC BLOOD PRESSURE: 125 MMHG

## 2024-02-23 DIAGNOSIS — R10.9 ABDOMINAL PAIN, UNSPECIFIED ABDOMINAL LOCATION: ICD-10-CM

## 2024-02-23 DIAGNOSIS — R11.0 NAUSEA: ICD-10-CM

## 2024-02-23 DIAGNOSIS — R19.7 DIARRHEA, UNSPECIFIED TYPE: ICD-10-CM

## 2024-02-23 DIAGNOSIS — K52.9 GASTROENTERITIS: Primary | ICD-10-CM

## 2024-02-23 LAB
BILIRUB BLD-MCNC: ABNORMAL MG/DL
CLARITY, POC: ABNORMAL
COLOR UR: ABNORMAL
EXPIRATION DATE: ABNORMAL
GLUCOSE UR STRIP-MCNC: NEGATIVE MG/DL
KETONES UR QL: NEGATIVE
LEUKOCYTE EST, POC: ABNORMAL
Lab: ABNORMAL
NITRITE UR-MCNC: NEGATIVE MG/ML
PH UR: 5.5 [PH] (ref 5–8)
PROT UR STRIP-MCNC: ABNORMAL MG/DL
RBC # UR STRIP: NEGATIVE /UL
SP GR UR: 1.03 (ref 1–1.03)
UROBILINOGEN UR QL: ABNORMAL

## 2024-02-23 PROCEDURE — 81003 URINALYSIS AUTO W/O SCOPE: CPT | Performed by: NURSE PRACTITIONER

## 2024-02-23 PROCEDURE — 99214 OFFICE O/P EST MOD 30 MIN: CPT | Performed by: NURSE PRACTITIONER

## 2024-02-23 RX ORDER — ONDANSETRON HYDROCHLORIDE 8 MG/1
8 TABLET, FILM COATED ORAL EVERY 8 HOURS PRN
Qty: 15 TABLET | Refills: 0 | Status: SHIPPED | OUTPATIENT
Start: 2024-02-23

## 2024-02-23 NOTE — PROGRESS NOTES
Chief Complaint  Abdominal Pain (Started Sunday.  No appetite. Pt reports nausea and acid reflux) and Diarrhea    Subjective          Giovanni is a 54 y.o. female  who presents to [facility]     Patient Care Team:  Kath Eisenberg MD as PCP - General  Reymundo Herrera MD as Consulting Physician (Pain Medicine)     History of Present Illness  Giovanni is here today for abdominal pain    Location: abdominal pain, diarrhea, nausea  Quality: sharp, stabbing  Severity: moderate  Duration: for 5- 6 days  Timing: constant  Context: She has tried Pepto bismol, tums, Imodium  Alleviating factors: nothing makes better  Aggravating factors: nothing makes worse  Associated Symptoms: + decreased appetite      Abd pain started on Sunday.  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  No recent travel, no recent antibiotic use      Giovanni Shin  has a past medical history of Acute bronchitis, Acute non-recurrent maxillary sinusitis, Acute pharyngitis, Acute serous otitis media, Age-related cataract of both eyes, Annual visit for general adult medical examination with abnormal findings, Anxiety and depression, Arthralgia of multiple joints, Atypical chest pain, Breast mass, left, Candidiasis of mouth, Chronic pain syndrome, Contact dermatitis or eczema, Cough, Depression, Encounter for screening mammogram for malignant neoplasm of breast, Gastroesophageal reflux disease without esophagitis, Headache, Hemangioma, Heterozygous factor V Leiden mutation, History of DVT (deep vein thrombosis), History of pulmonary embolism, History of tobacco use, Hypertension, benign, Low back pain, Lumbar disc disease with radiculopathy, Lumbar disc disorder with myelopathy, Malaise and fatigue, Menopause syndrome, Multiple nevi, Overweight (BMI 25.0-29.9), Positive depression screening,  Primary osteoarthritis of left knee, Pruritic disorder, Pruritus, Reactive airways dysfunction syndrome, Routine general medical examination at a health care facility, Screening for cervical cancer, Screening for depression, Screening for hyperlipidemia, Seasonal allergic rhinitis due to pollen, Stomatitis, viral, Tobacco use disorder, and Upper respiratory infection.      Review of Systems   Constitutional:  Positive for fatigue. Negative for fever.   HENT:  Negative for ear pain and sore throat.    Respiratory:  Positive for shortness of breath (with exertion). Negative for cough.    Cardiovascular:  Negative for chest pain.   Gastrointestinal:  Positive for abdominal pain, diarrhea and nausea. Negative for blood in stool, constipation and vomiting.   Genitourinary:  Negative for dysuria and hematuria.   Neurological:  Positive for headaches (off and on).        Family History   Problem Relation Age of Onset    Hyperlipidemia Mother     Hypertension Mother     Hypertension Father     Factor V Leiden deficiency Father     Factor V Leiden deficiency Brother     Colon cancer Maternal Grandfather     Heart disease Maternal Grandfather     Down syndrome Maternal Cousin     Dementia Maternal Grandmother     Stroke Paternal Grandmother     Stroke Paternal Grandfather         Past Surgical History:   Procedure Laterality Date    COLONOSCOPY N/A 2021    Procedure: COLONOSCOPY with polypectomy;  Surgeon: Lance Mccarty MD;  Location: [facility] ENDOSCOPY;  Service: Gastroenterology;  Laterality: N/A;  post op: polyps        Social History     Socioeconomic History    Marital status:    Tobacco Use    Smoking status: Former     Packs/day: 0.00     Years: 11.00     Additional pack years: 0.00     Total pack years: 0.00     Types: Cigarettes     Start date: 2006     Quit date: 2017     Years since quittin.1    Smokeless tobacco: Never    Tobacco comments:     I was a closet smoker   Vaping Use  "   Vaping Use: Never used   Substance and Sexual Activity    Alcohol use: Not Currently     Comment: rarely    Drug use: Never    Sexual activity: Yes     Partners: Male     Birth control/protection: I.U.D.        Immunization History   Administered Date(s) Administered    COVID-19 (MODERNA) 1st,2nd,3rd Dose Monovalent 01/11/2021, 01/11/2021, 02/08/2021, 02/18/2021    PPD Test 08/26/2006, 09/17/2019, 08/26/2020    Shingrix 04/25/2018, 07/17/2018    Tdap 03/20/2007, 03/03/2021       Objective       Current Outpatient Medications:     albuterol sulfate  (90 Base) MCG/ACT inhaler, INHALE 2 PUFFS BY MOUTH INTO THE LUNGS EVERY 6 HOURS AS NEEDED FOR WHEEZING OR SHORTNESS OF BREATH, Disp: , Rfl:     atenolol (TENORMIN) 50 MG tablet, Take 1 tablet by mouth Daily., Disp: 90 tablet, Rfl: 3    DULoxetine (CYMBALTA) 60 MG capsule, TAKE 1 CAPSULE BY MOUTH DAILY, Disp: 90 capsule, Rfl: 3    loratadine (CLARITIN) 10 MG tablet, Take 1 tablet by mouth Daily., Disp: , Rfl:     losartan (COZAAR) 50 MG tablet, Take 1 tablet by mouth Daily., Disp: 90 tablet, Rfl: 3    Mirena, 52 MG, 20 MCG/24HR IUD, , Disp: , Rfl:     pseudoephedrine (SUDAFED) 30 MG tablet, Take 1 tablet by mouth Every 4 (Four) Hours., Disp: , Rfl:     RABEprazole (ACIPHEX) 20 MG EC tablet, Take 1 tablet by mouth Daily As Needed (GERD)., Disp: 90 tablet, Rfl: 4    Xarelto 20 MG tablet, Take 1 tablet by mouth Daily., Disp: , Rfl:     ondansetron (Zofran) 8 MG tablet, Take 1 tablet by mouth Every 8 (Eight) Hours As Needed for Nausea or Vomiting., Disp: 15 tablet, Rfl: 0    Vital Signs:      /84   Pulse 93   Temp 98 °F (36.7 °C) (Temporal)   Resp 18   Ht 167.6 cm (65.98\")   Wt 101 kg (221 lb 12.8 oz)   SpO2 100%   BMI 35.82 kg/m²     Vitals:    02/23/24 1446   BP: 125/84   Pulse: 93   Resp: 18   Temp: 98 °F (36.7 °C)   TempSrc: Temporal   SpO2: 100%   Weight: 101 kg (221 lb 12.8 oz)   Height: 167.6 cm (65.98\")      Physical Exam  Vitals reviewed. "   Constitutional:       General: She is not in acute distress.     Appearance: Normal appearance. She is obese. She is not toxic-appearing or diaphoretic.   HENT:      Head: Normocephalic and atraumatic.      Right Ear: Tympanic membrane, ear canal and external ear normal.      Left Ear: Tympanic membrane, ear canal and external ear normal.      Nose: Nose normal.      Mouth/Throat:      Mouth: Mucous membranes are moist.      Pharynx: Oropharynx is clear.   Eyes:      General: No scleral icterus.     Conjunctiva/sclera: Conjunctivae normal.   Cardiovascular:      Rate and Rhythm: Normal rate and regular rhythm.      Heart sounds: Normal heart sounds.   Pulmonary:      Effort: Pulmonary effort is normal. No respiratory distress.      Breath sounds: Normal breath sounds. No wheezing.   Abdominal:      General: Bowel sounds are normal. There is no distension.      Palpations: Abdomen is soft. There is no mass.      Tenderness: There is no abdominal tenderness. There is no right CVA tenderness, left CVA tenderness, guarding or rebound.   Musculoskeletal:      Cervical back: Neck supple.      Right lower leg: No edema.      Left lower leg: No edema.   Lymphadenopathy:      Cervical: No cervical adenopathy.   Skin:     General: Skin is warm and dry.   Neurological:      Mental Status: She is alert and oriented to person, place, and time.   Psychiatric:         Mood and Affect: Mood normal.        Result Review :   The following data was reviewed by: AMADOU Hinds on 02/23/2024:             Office Visit on 02/23/2024   Component Date Value Ref Range Status    WBC 02/23/2024 10.5  3.4 - 10.8 x10E3/uL Final    RBC 02/23/2024 5.09  3.77 - 5.28 x10E6/uL Final    Hemoglobin 02/23/2024 16.6 (H)  11.1 - 15.9 g/dL Final    Hematocrit 02/23/2024 47.1 (H)  34.0 - 46.6 % Final    MCV 02/23/2024 93  79 - 97 fL Final    MCH 02/23/2024 32.6  26.6 - 33.0 pg Final    MCHC 02/23/2024 35.2  31.5 - 35.7 g/dL Final    RDW 02/23/2024  12.5  11.7 - 15.4 % Final    Platelets 02/23/2024 241  150 - 450 x10E3/uL Final    Neutrophil Rel % 02/23/2024 72  Not Estab. % Final    Lymphocyte Rel % 02/23/2024 18  Not Estab. % Final    Monocyte Rel % 02/23/2024 8  Not Estab. % Final    Eosinophil Rel % 02/23/2024 2  Not Estab. % Final    Basophil Rel % 02/23/2024 0  Not Estab. % Final    Neutrophils Absolute 02/23/2024 7.5 (H)  1.4 - 7.0 x10E3/uL Final    Lymphocytes Absolute 02/23/2024 1.8  0.7 - 3.1 x10E3/uL Final    Monocytes Absolute 02/23/2024 0.9  0.1 - 0.9 x10E3/uL Final    Eosinophils Absolute 02/23/2024 0.2  0.0 - 0.4 x10E3/uL Final    Basophils Absolute 02/23/2024 0.0  0.0 - 0.2 x10E3/uL Final    Immature Granulocyte Rel % 02/23/2024 0  Not Estab. % Final    Immature Grans Absolute 02/23/2024 0.0  0.0 - 0.1 x10E3/uL Final    Glucose 02/23/2024 125 (H)  70 - 99 mg/dL Final    BUN 02/23/2024 11  6 - 24 mg/dL Final    Creatinine 02/23/2024 0.93  0.57 - 1.00 mg/dL Final    EGFR Result 02/23/2024 72  >59 mL/min/1.73 Final    BUN/Creatinine Ratio 02/23/2024 12  9 - 23 Final    Sodium 02/23/2024 137  134 - 144 mmol/L Final    Potassium 02/23/2024 4.2  3.5 - 5.2 mmol/L Final    Chloride 02/23/2024 102  96 - 106 mmol/L Final    Total CO2 02/23/2024 20  20 - 29 mmol/L Final    Calcium 02/23/2024 10.0  8.7 - 10.2 mg/dL Final    Total Protein 02/23/2024 7.5  6.0 - 8.5 g/dL Final    Albumin 02/23/2024 4.9  3.8 - 4.9 g/dL Final    Globulin 02/23/2024 2.6  1.5 - 4.5 g/dL Final    A/G Ratio 02/23/2024 1.9  1.2 - 2.2 Final    Total Bilirubin 02/23/2024 1.5 (H)  0.0 - 1.2 mg/dL Final    Alkaline Phosphatase 02/23/2024 87  44 - 121 IU/L Final    AST (SGOT) 02/23/2024 21  0 - 40 IU/L Final    ALT (SGPT) 02/23/2024 23  0 - 32 IU/L Final    Color 02/23/2024 Orange (A)  Yellow, Straw, Dark Yellow, Yolanda Final    Clarity, UA 02/23/2024 Cloudy (A)  Clear Final    Specific Gravity  02/23/2024 1.030  1.005 - 1.030 Final    pH, Urine 02/23/2024 5.5  5.0 - 8.0 Final     Leukocytes 02/23/2024 Small (1+) (A)  Negative Final    Nitrite, UA 02/23/2024 Negative  Negative Final    Protein, POC 02/23/2024 30 mg/dL (A)  Negative mg/dL Final    Glucose, UA 02/23/2024 Negative  Negative mg/dL Final    Ketones, UA 02/23/2024 Negative  Negative Final    Urobilinogen, UA 02/23/2024 0.2 E.U./dL  Normal, 0.2 E.U./dL Final    Bilirubin 02/23/2024 Small (1+) (A)  Negative Final    Blood, UA 02/23/2024 Negative  Negative Final    Lot Number 02/23/2024 [id number]   Final    Expiration Date 02/23/2024 10/2,024   Final            Assessment and Plan    Diagnoses and all orders for this visit:    1. Gastroenteritis (Primary)    2. Abdominal pain, unspecified abdominal location  -     CBC & Differential  -     Comprehensive Metabolic Panel  -     POCT urinalysis dipstick, automated  -     Urine Culture - Urine, Urine, Random Void    3. Nausea  -     ondansetron (Zofran) 8 MG tablet; Take 1 tablet by mouth Every 8 (Eight) Hours As Needed for Nausea or Vomiting.  Dispense: 15 tablet; Refill: 0    4. Diarrhea, unspecified type  -     CBC & Differential  -     Comprehensive Metabolic Panel       Increase fluids.  Urine sent for C&S, labs ordered  Begin ondansetron for nausea and/or vomiting.  Continue OTC imodium (loperamide) if needed for diarrhea  She asked MA upon discharge about possibility of c diff and I offered to order stool testing.  She told MA she would see how she does over the weekend.  Follow-up 5-7 days for reevaluation if not improved or sooner if needed.       Follow Up   Return if symptoms worsen or fail to improve.  Patient was given instructions and counseling regarding her condition or for health maintenance advice. Please see specific information pulled into the AVS if appropriate.    There are no Patient Instructions on file for this visit.

## 2024-02-24 LAB
ALBUMIN SERPL-MCNC: 4.9 G/DL (ref 3.8–4.9)
ALBUMIN/GLOB SERPL: 1.9 {RATIO} (ref 1.2–2.2)
ALP SERPL-CCNC: 87 IU/L (ref 44–121)
ALT SERPL-CCNC: 23 IU/L (ref 0–32)
AST SERPL-CCNC: 21 IU/L (ref 0–40)
BASOPHILS # BLD AUTO: 0 X10E3/UL (ref 0–0.2)
BASOPHILS NFR BLD AUTO: 0 %
BILIRUB SERPL-MCNC: 1.5 MG/DL (ref 0–1.2)
BUN SERPL-MCNC: 11 MG/DL (ref 6–24)
BUN/CREAT SERPL: 12 (ref 9–23)
CALCIUM SERPL-MCNC: 10 MG/DL (ref 8.7–10.2)
CHLORIDE SERPL-SCNC: 102 MMOL/L (ref 96–106)
CO2 SERPL-SCNC: 20 MMOL/L (ref 20–29)
CREAT SERPL-MCNC: 0.93 MG/DL (ref 0.57–1)
EGFRCR SERPLBLD CKD-EPI 2021: 72 ML/MIN/1.73
EOSINOPHIL # BLD AUTO: 0.2 X10E3/UL (ref 0–0.4)
EOSINOPHIL NFR BLD AUTO: 2 %
ERYTHROCYTE [DISTWIDTH] IN BLOOD BY AUTOMATED COUNT: 12.5 % (ref 11.7–15.4)
GLOBULIN SER CALC-MCNC: 2.6 G/DL (ref 1.5–4.5)
GLUCOSE SERPL-MCNC: 125 MG/DL (ref 70–99)
HCT VFR BLD AUTO: 47.1 % (ref 34–46.6)
HGB BLD-MCNC: 16.6 G/DL (ref 11.1–15.9)
IMM GRANULOCYTES # BLD AUTO: 0 X10E3/UL (ref 0–0.1)
IMM GRANULOCYTES NFR BLD AUTO: 0 %
LYMPHOCYTES # BLD AUTO: 1.8 X10E3/UL (ref 0.7–3.1)
LYMPHOCYTES NFR BLD AUTO: 18 %
MCH RBC QN AUTO: 32.6 PG (ref 26.6–33)
MCHC RBC AUTO-ENTMCNC: 35.2 G/DL (ref 31.5–35.7)
MCV RBC AUTO: 93 FL (ref 79–97)
MONOCYTES # BLD AUTO: 0.9 X10E3/UL (ref 0.1–0.9)
MONOCYTES NFR BLD AUTO: 8 %
NEUTROPHILS # BLD AUTO: 7.5 X10E3/UL (ref 1.4–7)
NEUTROPHILS NFR BLD AUTO: 72 %
PLATELET # BLD AUTO: 241 X10E3/UL (ref 150–450)
POTASSIUM SERPL-SCNC: 4.2 MMOL/L (ref 3.5–5.2)
PROT SERPL-MCNC: 7.5 G/DL (ref 6–8.5)
RBC # BLD AUTO: 5.09 X10E6/UL (ref 3.77–5.28)
SODIUM SERPL-SCNC: 137 MMOL/L (ref 134–144)
WBC # BLD AUTO: 10.5 X10E3/UL (ref 3.4–10.8)

## 2024-02-24 NOTE — PROGRESS NOTES
Let her know:    1. CBC - hemoglobin and hematocrit are elevated and this can be due to dehydration.      Try to push fluids.  Ice chips and popsicles are a way to increase fluids.  Use the Zofran (ondansetron) for nausea.    2. CMP - Blood sugar was 125  Kidney function, electrolytes and liver enzymes are normal.  Bilirubin slightly elevated. Recheck in future.    Make a follow up appointment if you are not better.  Go to an urgent care or ER this weekend if not better or if worse.

## 2024-02-25 LAB
BACTERIA UR CULT: NORMAL
BACTERIA UR CULT: NORMAL

## 2024-02-29 DIAGNOSIS — F41.9 ANXIETY AND DEPRESSION: ICD-10-CM

## 2024-02-29 DIAGNOSIS — F32.A ANXIETY AND DEPRESSION: ICD-10-CM

## 2024-02-29 DIAGNOSIS — I10 HYPERTENSION, BENIGN: ICD-10-CM

## 2024-03-01 RX ORDER — ATENOLOL 50 MG/1
50 TABLET ORAL DAILY
Qty: 90 TABLET | Refills: 3 | Status: SHIPPED | OUTPATIENT
Start: 2024-03-01

## 2024-03-01 RX ORDER — DULOXETIN HYDROCHLORIDE 60 MG/1
60 CAPSULE, DELAYED RELEASE ORAL DAILY
Qty: 90 CAPSULE | Refills: 3 | Status: SHIPPED | OUTPATIENT
Start: 2024-03-01

## 2024-03-11 DIAGNOSIS — I10 HYPERTENSION, BENIGN: ICD-10-CM

## 2024-03-11 RX ORDER — LOSARTAN POTASSIUM 50 MG/1
50 TABLET ORAL DAILY
Qty: 90 TABLET | Refills: 3 | Status: SHIPPED | OUTPATIENT
Start: 2024-03-11

## 2024-03-18 NOTE — PROGRESS NOTES
Chief Complaint  Prediabetes, Hypertension, and URI    Subjective     CC  Problem List  Visit Diagnosis   Encounters  Notes  Medications  Labs  Result Review Imaging  Media    Giovanni Shin presents to [facility] for   History of Present Illness      Hypertension  This is a chronic problem. The current episode started more than 1 year ago. The problem has been waxing and waning since onset. The problem is controlled. Associated symptoms include anxiety, headaches, malaise/fatigue, peripheral edema and sweats. Pertinent negatives include no chest pain, neck pain, orthopnea, palpitations, PND or shortness of breath. Risk factors for coronary artery disease include post-menopausal state and family history. Current antihypertension treatment includes angiotensin blockers and beta blockers. The current treatment provides significant improvement. There are no compliance problems.  There is no history of angina, kidney disease, CAD/MI, heart failure, PVD or retinopathy. Identifiable causes of hypertension include hyperparathyroidism.   Blood Sugar Problem  This is a new problem. The current episode started more than 1 month ago. Associated symptoms include chills, congestion, coughing, fatigue, headaches and a sore throat. Pertinent negatives include no change in bowel habit, chest pain, fever, nausea, neck pain, numbness, rash, visual change, vomiting or weakness. The symptoms are aggravated by eating. She has tried nothing for the symptoms.   URI   This is a new problem. The current episode started in the past 7 days. The problem has been unchanged. There has been no fever. Associated symptoms include congestion, coughing, ear pain, headaches, rhinorrhea, sinus pain and a sore throat. Pertinent negatives include no chest pain, nausea, neck pain, rash, vomiting or wheezing. She has tried decongestant and acetaminophen for the symptoms. The treatment provided mild relief.  "      Review of Systems   Constitutional:  Positive for chills, fatigue and malaise/fatigue. Negative for fever.   HENT:  Positive for congestion, ear pain, rhinorrhea and sore throat.    Respiratory:  Positive for cough. Negative for shortness of breath and wheezing.    Cardiovascular:  Negative for chest pain, palpitations, orthopnea and PND.   Gastrointestinal:  Negative for change in bowel habit, nausea and vomiting.   Musculoskeletal:  Negative for neck pain.   Skin:  Negative for rash.   Neurological:  Negative for weakness and numbness.        Objective   Vital Signs:   /82   Pulse 118   Temp 97.3 °F (36.3 °C) (Temporal)   Resp 18   Ht 167.6 cm (65.98\")   Wt 104 kg (230 lb)   SpO2 99%   BMI 37.14 kg/m²     Physical Exam  Constitutional:       General: She is not in acute distress.     Appearance: She is obese.   HENT:      Mouth/Throat:      Pharynx: No oropharyngeal exudate or posterior oropharyngeal erythema (moderate post nasal secretions).   Cardiovascular:      Rate and Rhythm: Normal rate and regular rhythm.      Heart sounds: No murmur heard.  Pulmonary:      Effort: Pulmonary effort is normal.      Breath sounds: Normal breath sounds.   Musculoskeletal:      Cervical back: Neck supple.   Lymphadenopathy:      Cervical: No cervical adenopathy.   Skin:     Findings: No rash.   Neurological:      Mental Status: She is alert.        Result Review :Labs  Result Review  Imaging  Med Tab  Media                 Assessment and Plan CC Problem List  Visit Diagnosis  ROS  Review (Popup)  Health Maintenance  Quality  BestPractice  Medications  SmartSets  SnapShot Encounters  Media  Problem List Items Addressed This Visit          High    Hypertension, benign    Overview     Controlled she is compliant.         Current Assessment & Plan     Hypertension is stable and controlled  Continue current treatment regimen.  Blood pressure will be reassessed in 3 months.         Relevant Orders "    POCT urinalysis dipstick, manual (Completed)    Prediabetes - Primary    Overview     A1c 5.8 03/19/2023 improved continue diet increase exercise.   A1c 6.3 12/12/2023 worse wt increased 5 lb  A1c 5.1 06/07/2023 much improved. 5 lb wt loss she is contgratulated.   A1 c 5.7 03/22/2023  The importance of low carb diet, wt reduction and regular exercise understood. Repeat A1c in 3-6 mos          Relevant Orders    POC Glycosylated Hemoglobin (Hb A1C) (Completed)    POCT urinalysis dipstick, manual (Completed)       Low    Allergic rhinitis due to pollen    Overview     Exacerbation. Does not tolerate montelukast continue anti histamines and Flonase         Relevant Medications    fluticasone (FLONASE) 50 MCG/ACT nasal spray    Class 2 severe obesity due to excess calories with serious comorbidity and body mass index (BMI) of 36.0 to 36.9 in adult    Current Assessment & Plan     Patient's (Body mass index is 37.14 kg/m².) indicates that they are obese (BMI >30) with health conditions that include hypertension and dyslipidemias . Weight is worsening. BMI  is above average; BMI management plan is completed. We discussed portion control and increasing exercise.          History of tobacco use    Overview     1 pack per week, x 10 years, she abstains          Other Visit Diagnoses       Acute non-recurrent maxillary sinusitis        Relevant Medications    doxycycline (MONODOX) 100 MG capsule    Acute cough        Relevant Medications    HYDROcodone Bit-Homatrop MBr (HYCODAN) 5-1.5 MG/5ML solution    Other Relevant Orders    POCT SARS-CoV-2 Antigen ALISIA (Completed)    Overweight (BMI 25.0-29.9)                Follow Up Instructions Charge Capture  Follow-up Communications  Return in about 3 months (around 6/19/2024), or if symptoms worsen or fail to improve.  Patient was given instructions and counseling regarding her condition or for health maintenance advice. Please see specific information pulled into the AVS if  appropriate.       Class 2 Severe Obesity (BMI >=35 and <=39.9). Obesity-related health conditions include the following: hypertension, diabetes mellitus, and GERD. Obesity is unchanged. BMI is is above average; BMI management plan is completed. We discussed portion control and increasing exercise.   Answers submitted by the patient for this visit:  Other (Submitted on 3/17/2024)  Please describe your symptoms.: Pre-diabetes A1C check  Have you had these symptoms before?: Yes  How long have you been having these symptoms?: Greater than 2 weeks  Please list any medications you are currently taking for this condition.: None  Please describe any probable cause for these symptoms. : Weight, bp  Primary Reason for Visit (Submitted on 3/17/2024)  What is the primary reason for your visit?: Other

## 2024-03-19 ENCOUNTER — OFFICE VISIT (OUTPATIENT)
Dept: FAMILY MEDICINE CLINIC | Facility: CLINIC | Age: 54
End: 2024-03-19
Payer: COMMERCIAL

## 2024-03-19 VITALS
RESPIRATION RATE: 18 BRPM | OXYGEN SATURATION: 99 % | HEIGHT: 66 IN | WEIGHT: 230 LBS | BODY MASS INDEX: 36.96 KG/M2 | HEART RATE: 118 BPM | TEMPERATURE: 97.3 F | SYSTOLIC BLOOD PRESSURE: 130 MMHG | DIASTOLIC BLOOD PRESSURE: 82 MMHG

## 2024-03-19 DIAGNOSIS — R73.03 PREDIABETES: Primary | ICD-10-CM

## 2024-03-19 DIAGNOSIS — E66.01 CLASS 2 SEVERE OBESITY DUE TO EXCESS CALORIES WITH SERIOUS COMORBIDITY AND BODY MASS INDEX (BMI) OF 36.0 TO 36.9 IN ADULT: ICD-10-CM

## 2024-03-19 DIAGNOSIS — Z87.891 HISTORY OF TOBACCO USE: ICD-10-CM

## 2024-03-19 DIAGNOSIS — I10 HYPERTENSION, BENIGN: ICD-10-CM

## 2024-03-19 DIAGNOSIS — R05.1 ACUTE COUGH: ICD-10-CM

## 2024-03-19 DIAGNOSIS — J01.00 ACUTE NON-RECURRENT MAXILLARY SINUSITIS: ICD-10-CM

## 2024-03-19 DIAGNOSIS — E66.3 OVERWEIGHT (BMI 25.0-29.9): ICD-10-CM

## 2024-03-19 DIAGNOSIS — J30.1 SEASONAL ALLERGIC RHINITIS DUE TO POLLEN: ICD-10-CM

## 2024-03-19 LAB
BILIRUB BLD-MCNC: NEGATIVE MG/DL
CLARITY, POC: CLEAR
COLOR UR: YELLOW
EXPIRATION DATE: ABNORMAL
EXPIRATION DATE: NORMAL
GLUCOSE UR STRIP-MCNC: NEGATIVE MG/DL
HBA1C MFR BLD: 5.8 % (ref 4.5–5.7)
INTERNAL CONTROL: NORMAL
KETONES UR QL: NEGATIVE
LEUKOCYTE EST, POC: ABNORMAL
Lab: ABNORMAL
Lab: NORMAL
NITRITE UR-MCNC: NEGATIVE MG/ML
PH UR: 5 [PH] (ref 5–8)
PROT UR STRIP-MCNC: NEGATIVE MG/DL
RBC # UR STRIP: NEGATIVE /UL
SARS-COV-2 AG UPPER RESP QL IA.RAPID: NOT DETECTED
SP GR UR: 1.01 (ref 1–1.03)
UROBILINOGEN UR QL: NORMAL

## 2024-03-19 RX ORDER — FLUTICASONE PROPIONATE 50 MCG
2 SPRAY, SUSPENSION (ML) NASAL DAILY
Qty: 18 ML | Refills: 12 | Status: SHIPPED | OUTPATIENT
Start: 2024-03-19

## 2024-03-19 RX ORDER — DOXYCYCLINE 100 MG/1
100 CAPSULE ORAL 2 TIMES DAILY
Qty: 20 CAPSULE | Refills: 0 | Status: SHIPPED | OUTPATIENT
Start: 2024-03-19

## 2024-03-19 RX ORDER — HYDROCODONE BITARTRATE AND HOMATROPINE METHYLBROMIDE ORAL SOLUTION 5; 1.5 MG/5ML; MG/5ML
5 LIQUID ORAL EVERY 6 HOURS PRN
Qty: 120 ML | Refills: 0 | Status: SHIPPED | OUTPATIENT
Start: 2024-03-19

## 2024-03-31 NOTE — ASSESSMENT & PLAN NOTE
Patient's (Body mass index is 37.14 kg/m².) indicates that they are obese (BMI >30) with health conditions that include hypertension and dyslipidemias . Weight is worsening. BMI  is above average; BMI management plan is completed. We discussed portion control and increasing exercise.

## 2024-04-23 ENCOUNTER — OFFICE VISIT (OUTPATIENT)
Dept: FAMILY MEDICINE CLINIC | Facility: CLINIC | Age: 54
End: 2024-04-23
Payer: COMMERCIAL

## 2024-04-23 VITALS
OXYGEN SATURATION: 96 % | HEART RATE: 65 BPM | RESPIRATION RATE: 16 BRPM | HEIGHT: 66 IN | DIASTOLIC BLOOD PRESSURE: 66 MMHG | BODY MASS INDEX: 36.71 KG/M2 | TEMPERATURE: 98.9 F | WEIGHT: 228.4 LBS | SYSTOLIC BLOOD PRESSURE: 122 MMHG

## 2024-04-23 DIAGNOSIS — E66.01 CLASS 2 SEVERE OBESITY DUE TO EXCESS CALORIES WITH SERIOUS COMORBIDITY AND BODY MASS INDEX (BMI) OF 36.0 TO 36.9 IN ADULT: ICD-10-CM

## 2024-04-23 DIAGNOSIS — L98.9 SKIN LESION: Primary | ICD-10-CM

## 2024-04-23 PROCEDURE — 99213 OFFICE O/P EST LOW 20 MIN: CPT | Performed by: STUDENT IN AN ORGANIZED HEALTH CARE EDUCATION/TRAINING PROGRAM

## 2024-04-23 NOTE — PROGRESS NOTES
"Subjective   Giovanni Shin is a 54 y.o. female.   Chief Complaint   Patient presents with    Establish Care     Pt transferring from  [place]    Skin Lesion     Right cheek       History of Present Illness       Right cheek lesion  -First noticed about 6 months ago  -Lesion is dry, not flaking or itching.  Brown in color  -Has tried several OTC lotions and creams (even a moisturizer prescribed by dermatology that has worked well for her dry spots on other parts of her face \"Purpose\")  -Patient has also been using retinol on her face      The following portions of the patient's history were reviewed and updated as appropriate: allergies, current medications, past family history, past medical history, past social history, past surgical history, and problem list.    Patient Active Problem List   Diagnosis    Age-related cataract of both eyes    Allergic rhinitis due to pollen    Anxiety and depression    Arthralgia of multiple joints    Encounter for annual general medical examination with abnormal findings in adult    Encounter for screening mammogram for malignant neoplasm of breast    Gastroesophageal reflux disease without esophagitis    Heterozygous factor V Leiden mutation    History of DVT (deep vein thrombosis)    History of pulmonary embolism    Hypertension, benign    Lumbar disc disease with radiculopathy    Class 2 severe obesity due to excess calories with serious comorbidity and body mass index (BMI) of 36.0 to 36.9 in adult    Primary osteoarthritis of left knee    Screening for cervical cancer    History of tobacco use    Fibromyalgia    Primary osteoarthritis of left hip    Chronic bilateral low back pain with bilateral sciatica    Asymptomatic menopausal state    Screening for colon cancer    Presence of 52 mg levonorgestrel-releasing intrauterine device (IUD)    Menorrhagia with regular cycle    Hyperplastic polyp of descending colon    Prediabetes    DDD (degenerative disc disease), " "cervical       Current Outpatient Medications on File Prior to Visit   Medication Sig Dispense Refill    atenolol (TENORMIN) 50 MG tablet Take 1 tablet by mouth daily. 90 tablet 3    DULoxetine (CYMBALTA) 60 MG capsule Take 1 capsule by mouth daily. 90 capsule 3    fluticasone (FLONASE) 50 MCG/ACT nasal spray 2 sprays into the nostril(s) as directed by provider Daily. 18 mL 12    loratadine (CLARITIN) 10 MG tablet Take 1 tablet by mouth Daily.      losartan (COZAAR) 50 MG tablet Take 1 tablet by mouth Daily. 90 tablet 3    Mirena, 52 MG, 20 MCG/24HR IUD       pseudoephedrine (SUDAFED) 30 MG tablet Take 1 tablet by mouth Every 4 (Four) Hours.      RABEprazole (ACIPHEX) 20 MG EC tablet Take 1 tablet by mouth Daily As Needed (GERD). (Patient taking differently: Take 1 tablet by mouth Daily As Needed (GERD). TO BE FILLED AT [facility]) 90 tablet 4    Xarelto 20 MG tablet Take 1 tablet by mouth Daily.       No current facility-administered medications on file prior to visit.     Current outpatient and discharge medications have been reconciled for the patient.  Reviewed by: Clair Fonseca DO      Allergies   Allergen Reactions    Belbuca [Buprenorphine Hcl] Dizziness     MAKES SLEEPY    Gabapentin Other (See Comments)     DROWSINESS    Hysingla Er [Hydrocodone Bitartrate Er] Dizziness     MAKES PATIENT SLEEPY     Lyrica [Pregabalin] Unknown - Low Severity     WEIGHT GAIN AND MAKES SLEEPY.          Objective   Visit Vitals  /66 (BP Location: Left arm, Patient Position: Sitting, Cuff Size: Adult)   Pulse 65   Temp 98.9 °F (37.2 °C) (Skin)   Resp 16   Ht 167.6 cm (65.98\")   Wt 104 kg (228 lb 6.4 oz)   SpO2 96%   BMI 36.89 kg/m²       Physical Exam  HENT:      Head: Normocephalic and atraumatic.   Eyes:      Conjunctiva/sclera: Conjunctivae normal.   Skin:     Comments: - right cheek: Lesion is about 1 mm in diameter.  Faint brown color, not raised but the skin   Neurological:      General: No focal deficit " present.      Mental Status: She is alert and oriented to person, place, and time.   Psychiatric:         Mood and Affect: Mood normal.         Behavior: Behavior normal.           Diagnoses and all orders for this visit:    1. Skin lesion (Primary)  -Discussed with patient that considering the appearance at this point in time, I think it might be a small seborrheic keratoses.  Explained to patient that these are safe benign lesions  -Told patient that if she wanted to have that one removed, considering its on the face I would prefer dermatology do that 1  -However, told patient that as rashes or lesions develop they can become more characteristic and point to a different diagnosis so for changes to come back and let me know.  Patient verbalized understanding and is agreeable    2. Class 2 severe obesity due to excess calories with serious comorbidity and body mass index (BMI) of 36.0 to 36.9 in adult               Follow Up  - 1-2 months annual physical and pap    Expected course, medications, and adverse effects discussed as appropriate.  Call or return if worsening or persistent symptoms.  I wore protective equipment throughout this patient encounter to include mask and eye protection. Hand hygiene was performed before donning protective equipment and after removal when leaving the room.    This document is intended for medical expert use only. Reading of this document by patients and/or patient's family without participating medical staff guidance may result in misinterpretation and unintended morbidity. Any interpretation of such data is the responsibility of the patient and/or family member responsible for the patient in concert with their primary or specialist providers, not to be left for sources of online searches such as [employer], [employer] or similar queries. Relying on these approaches to knowledge may result in misinterpretation, misguided goals of care and even death should patients or family members try  recommendations outside of the realm of professional medical care.

## 2024-04-26 ENCOUNTER — PATIENT ROUNDING (BHMG ONLY) (OUTPATIENT)
Dept: FAMILY MEDICINE CLINIC | Facility: CLINIC | Age: 54
End: 2024-04-26
Payer: COMMERCIAL

## 2024-04-26 NOTE — PROGRESS NOTES
April 26, 2024    Voicemail not available    . Hello, My name is Karin Dinesh, and I am calling your from    [facility], [facility]  I wanted to welcome you to our practice and ensure that your first visit went smoothly. If you have any question or concerns, feel free to reach out to me directly at [phone]. Thank you and have a great day!

## 2024-05-31 NOTE — PROGRESS NOTES
Chief Complaint  Chief Complaint   Patient presents with    Annual Exam    Cough       Subjective    History of Present Illness        Giovanni Shin presents to [facility] for   Giovanni Shin is a 54 y.o. female here for her annual physical with me. Giovanni is here for coordination of medical care, to discuss health maintenance, disease prevention as well as to followup on medical problems.     Giovanni also has concerns of recent onset of a cough that started 2 days ago. She reports a dry cough, tickle/itch in the throat, and chest congestion. She denies any fever or any productivity with the cough. She has tried fluticasone nasal spray and cough drops with slight improvement.    Annual Physical Exam  Patient's last Physical Exam was 03/22/2023. Patient's medical history, medications and allergy lists were reviewed and updated.  Activity level is minimal.   Exercises 0 per week.   Appetite is good.   Feels fairly well with moderate amount complaints.   Energy level is fair.   Sleeps poorly.   Patient's last colonoscopy was 06/25/2021. She is advised to repeat in 10 years.   Patient's last mammogram was 04/07/2023.   Patient is doing routine self skin exam monthly.   Patient is doing routine self-breast exams monthly  Patient's menstrual cycles are: rarely with mirana birth control  Last pap smear was done:  05/19/2023    Anxiety and depression screening/fibromyalgia  -Patient currently on Cymbalta 60 mg daily.  Was originally put on due to her fibromyalgia and back pain (helps a little)  - Today   PHQ-9: 9   CHRISSY-7: 12  - states there are days she's perkins and other days she's fine  - wants to stay on current dose of medication for now      Cough  - started 3 days ago, has ear pain in both sides  -Patient was recently seen by dentist for an infected tooth and was given a course of amoxicillin.  She has not picked that up yet      Chest pains  -A chronic issue that comes and  "goes  -Current occurrence started 10 to 14 days ago.  Feels like a \"bruise\" on the left side of her chest.  Has worked up in the past and thought to be due to costochondritis flares  - denies sob (o2 sat 97% today) or palpitations      Family History   Problem Relation Age of Onset    Hyperlipidemia Mother     Hypertension Mother     Hypertension Father     Factor V Leiden deficiency Father     Factor V Leiden deficiency Brother     Dementia Maternal Grandmother     Colon cancer Maternal Grandfather     Heart disease Maternal Grandfather     Stroke Paternal Grandmother     Stroke Paternal Grandfather     Down syndrome Maternal Cousin     Down syndrome Son        Social History     Tobacco Use    Smoking status: Former     Current packs/day: 0.00     Types: Cigarettes     Start date: 2006     Quit date: 2017     Years since quittin.4     Passive exposure: Past    Smokeless tobacco: Never    Tobacco comments:     I was a closet smoker   Vaping Use    Vaping status: Never Used   Substance Use Topics    Alcohol use: Not Currently     Comment: 1 drink per month    Drug use: Never       Past Surgical History:   Procedure Laterality Date    COLONOSCOPY N/A 2021    Procedure: COLONOSCOPY with polypectomy;  Surgeon: Lance Mccarty MD;  Location: [facility];  Service: Gastroenterology;  Laterality: N/A;  post op: polyps    WISDOM TOOTH EXTRACTION         Patient Active Problem List   Diagnosis    Age-related cataract of both eyes    Allergic rhinitis due to pollen    Anxiety and depression    Arthralgia of multiple joints    Gastroesophageal reflux disease without esophagitis    Heterozygous factor V Leiden mutation    History of DVT (deep vein thrombosis)    History of pulmonary embolism    Hypertension, benign    Lumbar disc disease with radiculopathy    Class 2 severe obesity due to excess calories with serious comorbidity and body mass index (BMI) of 36.0 to 36.9 in adult    Primary " "osteoarthritis of left knee    History of tobacco use    Fibromyalgia    Primary osteoarthritis of left hip    Chronic bilateral low back pain with bilateral sciatica    Asymptomatic menopausal state    Presence of 52 mg levonorgestrel-releasing intrauterine device (IUD)    Menorrhagia with regular cycle    Hyperplastic polyp of descending colon    Prediabetes    DDD (degenerative disc disease), cervical         Current Outpatient Medications:     acetaminophen (TYLENOL) 650 MG 8 hr tablet, Take 2 tablets by mouth Every 8 (Eight) Hours As Needed for Mild Pain., Disp: , Rfl:     AMOXICILLIN PO, Take  by mouth., Disp: , Rfl:     atenolol (TENORMIN) 50 MG tablet, Take 1 tablet by mouth daily., Disp: 90 tablet, Rfl: 3    DULoxetine (CYMBALTA) 60 MG capsule, Take 1 capsule by mouth Daily., Disp: 90 capsule, Rfl: 1    fluticasone (FLONASE) 50 MCG/ACT nasal spray, 2 sprays into the nostril(s) as directed by provider Daily., Disp: 18 mL, Rfl: 12    loratadine (CLARITIN) 10 MG tablet, Take 1 tablet by mouth Daily., Disp: , Rfl:     losartan (COZAAR) 50 MG tablet, Take 1 tablet by mouth Daily., Disp: 90 tablet, Rfl: 3    Mirena, 52 MG, 20 MCG/24HR IUD, , Disp: , Rfl:     pseudoephedrine (SUDAFED) 30 MG tablet, Take 1 tablet by mouth Every 4 (Four) Hours., Disp: , Rfl:     RABEprazole (ACIPHEX) 20 MG EC tablet, Take 1 tablet by mouth Daily As Needed (GERD). (Patient taking differently: Take 1 tablet by mouth Daily As Needed (GERD). TO BE FILLED AT [facility] [place]), Disp: 90 tablet, Rfl: 4    Xarelto 20 MG tablet, Take 1 tablet by mouth Daily., Disp: , Rfl:     Objective   /82 (BP Location: Right arm, Patient Position: Sitting, Cuff Size: Large Adult)   Pulse 100   Temp 96.8 °F (36 °C)   Resp 18   Ht 167.6 cm (66\")   Wt 103 kg (228 lb)   SpO2 97%   BMI 36.80 kg/m²   BP Readings from Last 3 Encounters:   06/03/24 122/82   04/23/24 122/66   03/19/24 130/82     Wt Readings from Last 3 Encounters:   06/03/24 103 kg " (228 lb)   04/23/24 104 kg (228 lb 6.4 oz)   03/19/24 104 kg (230 lb)     Physical Exam  Constitutional:       General: She is not in acute distress.  HENT:      Head: Normocephalic and atraumatic.      Ears:      Comments: - Serous fluid behind bilateral tympanic membranes.  No purulence, erythema or bulging noted     Nose: Nose normal.      Mouth/Throat:      Mouth: Mucous membranes are moist.      Pharynx: Oropharynx is clear. No posterior oropharyngeal erythema.      Comments: - No swollen tonsils  -No erythema of the throat, no cobblestoning noted  -No lesions or exudate noted  Eyes:      Extraocular Movements: Extraocular movements intact.      Conjunctiva/sclera: Conjunctivae normal.   Neck:      Comments: - Thyroid not enlarged  Cardiovascular:      Rate and Rhythm: Normal rate and regular rhythm.      Heart sounds: Normal heart sounds.   Pulmonary:      Effort: Pulmonary effort is normal.      Breath sounds: Normal breath sounds. No stridor. No wheezing.   Abdominal:      General: Abdomen is flat. Bowel sounds are normal.      Palpations: Abdomen is soft. There is no mass.      Tenderness: There is no abdominal tenderness. There is no guarding.   Musculoskeletal:         General: No swelling or deformity. Normal range of motion.      Cervical back: Normal range of motion and neck supple.   Skin:     General: Skin is warm and dry.      Capillary Refill: Capillary refill takes less than 2 seconds.      Coloration: Skin is not jaundiced.      Findings: No rash.   Neurological:      General: No focal deficit present.      Mental Status: She is alert and oriented to person, place, and time.      Cranial Nerves: No cranial nerve deficit.      Motor: No weakness.      Coordination: Coordination normal.      Gait: Gait normal.      Deep Tendon Reflexes: Reflexes normal.   Psychiatric:         Mood and Affect: Mood normal.         Behavior: Behavior normal.         Thought Content: Thought content normal.              Assessment and Plan   Diagnoses and all orders for this visit:    1. Annual physical exam (Primary)  -Overall normal 54-year-old female exam  -Pertinent screening labs ordered per below    2. Anxiety and depression  -   Refilled DULoxetine (CYMBALTA) 60 MG capsule; Take 1 capsule by mouth Daily.  Dispense: 90 capsule; Refill: 1    3. Acute cough  -Discussed with patient that usually when we get sick it is due to a viral infection which can take 7 to 10 days to recover from.  However she has a known infected tooth and the amoxicillin would cover an upper respiratory infection.  Patient states she will get that course likely later today and start the course  -Told her if it is not effective for her upper respiratory infection her infection is likely viral and will run its course    4. Chest pain, unspecified type  -Patient has a known and developed tooth infection.  Discussed with patient that if this feels like her costochondritis flaring up that it could be some of the circulating inflammatory hormones that could be causing this to act up if she is prone to developing costochondritis  -Recommended getting the amoxicillin per above and using Tylenol    5. Screening mammogram for breast cancer  -     Mammo Screening Digital Tomosynthesis Bilateral With CAD; Future    6. Elevated cholesterol  -     Lipid panel    7. Thyroid disorder screen  -     TSH Rfx On Abnormal To Free T4    8. Obesity (BMI 30-39.9)  -Information about nutrition and standardized exercise recommendations added to patient's after visit summary       Follow Up   - 1 year for annual physical exam  - 6 months for anxiety and depression/fibromyalgia check      The patient was counseled regarding nutrition, physical activity, healthy weight, injury prevention, immunizations and preventative health screenings. Expected course, medications, and adverse effects discussed.  Call or return if worsening or persistent symptoms.  I wore protective  equipment throughout this patient encounter including a mask and eye protection.   The complete contents of the Assessment and Plan and Data / Lab Results as documented above have been reviewed and addressed by myself with the patient today as part of an ongoing evaluation / treatment plan.

## 2024-06-03 ENCOUNTER — OFFICE VISIT (OUTPATIENT)
Dept: FAMILY MEDICINE CLINIC | Facility: CLINIC | Age: 54
End: 2024-06-03
Payer: COMMERCIAL

## 2024-06-03 VITALS
TEMPERATURE: 96.8 F | WEIGHT: 228 LBS | HEIGHT: 66 IN | HEART RATE: 100 BPM | RESPIRATION RATE: 18 BRPM | DIASTOLIC BLOOD PRESSURE: 82 MMHG | BODY MASS INDEX: 36.64 KG/M2 | SYSTOLIC BLOOD PRESSURE: 122 MMHG | OXYGEN SATURATION: 97 %

## 2024-06-03 DIAGNOSIS — Z00.00 ANNUAL PHYSICAL EXAM: Primary | ICD-10-CM

## 2024-06-03 DIAGNOSIS — R07.9 CHEST PAIN, UNSPECIFIED TYPE: ICD-10-CM

## 2024-06-03 DIAGNOSIS — R05.1 ACUTE COUGH: ICD-10-CM

## 2024-06-03 DIAGNOSIS — E78.00 ELEVATED CHOLESTEROL: ICD-10-CM

## 2024-06-03 DIAGNOSIS — E66.9 OBESITY (BMI 30-39.9): ICD-10-CM

## 2024-06-03 DIAGNOSIS — F41.9 ANXIETY AND DEPRESSION: ICD-10-CM

## 2024-06-03 DIAGNOSIS — F32.A ANXIETY AND DEPRESSION: ICD-10-CM

## 2024-06-03 DIAGNOSIS — Z12.31 SCREENING MAMMOGRAM FOR BREAST CANCER: ICD-10-CM

## 2024-06-03 DIAGNOSIS — Z13.29 THYROID DISORDER SCREEN: ICD-10-CM

## 2024-06-03 PROCEDURE — 99214 OFFICE O/P EST MOD 30 MIN: CPT | Performed by: STUDENT IN AN ORGANIZED HEALTH CARE EDUCATION/TRAINING PROGRAM

## 2024-06-03 PROCEDURE — 99396 PREV VISIT EST AGE 40-64: CPT | Performed by: STUDENT IN AN ORGANIZED HEALTH CARE EDUCATION/TRAINING PROGRAM

## 2024-06-03 RX ORDER — DULOXETIN HYDROCHLORIDE 60 MG/1
60 CAPSULE, DELAYED RELEASE ORAL DAILY
Qty: 90 CAPSULE | Refills: 1 | Status: SHIPPED | OUTPATIENT
Start: 2024-06-03

## 2024-06-03 RX ORDER — SENNOSIDES 8.6 MG
1300 CAPSULE ORAL EVERY 8 HOURS PRN
COMMUNITY

## 2024-06-04 ENCOUNTER — TELEPHONE (OUTPATIENT)
Dept: FAMILY MEDICINE CLINIC | Facility: CLINIC | Age: 54
End: 2024-06-04
Payer: COMMERCIAL

## 2024-06-04 PROBLEM — E78.2 MIXED HYPERLIPIDEMIA: Status: ACTIVE | Noted: 2024-06-04

## 2024-06-04 LAB
CHOLEST SERPL-MCNC: 229 MG/DL (ref 100–199)
HDLC SERPL-MCNC: 56 MG/DL
LDLC SERPL CALC-MCNC: 149 MG/DL (ref 0–99)
TRIGL SERPL-MCNC: 134 MG/DL (ref 0–149)
TSH SERPL DL<=0.005 MIU/L-ACNC: 1.43 UIU/ML (ref 0.45–4.5)
VLDLC SERPL CALC-MCNC: 24 MG/DL (ref 5–40)

## 2024-06-04 NOTE — TELEPHONE ENCOUNTER
----- Message from Clair Fonseca sent at 6/4/2024  5:07 PM EDT -----  Patient's thyroid screen came back in normal range.  Her cholesterol came back elevated and very similar to what it was last year.  Her ASCVD risk is 3% so we do not need to start a medication but would encourage her to increase regular cardio activity and decrease the amount of cholesterol in her diet

## 2024-06-13 ENCOUNTER — HOSPITAL ENCOUNTER (OUTPATIENT)
Dept: MAMMOGRAPHY | Facility: HOSPITAL | Age: 54
Discharge: HOME | End: 2024-06-13
Admitting: STUDENT IN AN ORGANIZED HEALTH CARE EDUCATION/TRAINING PROGRAM
Payer: COMMERCIAL

## 2024-06-13 DIAGNOSIS — Z12.31 SCREENING MAMMOGRAM FOR BREAST CANCER: ICD-10-CM

## 2024-06-13 PROCEDURE — 77063 BREAST TOMOSYNTHESIS BI: CPT

## 2024-06-13 PROCEDURE — 77067 SCR MAMMO BI INCL CAD: CPT

## 2024-11-08 DIAGNOSIS — K21.9 GASTROESOPHAGEAL REFLUX DISEASE WITHOUT ESOPHAGITIS: ICD-10-CM

## 2024-11-08 RX ORDER — RABEPRAZOLE SODIUM 20 MG/1
TABLET, DELAYED RELEASE ORAL
Qty: 90 TABLET | Refills: 3 | Status: SHIPPED | OUTPATIENT
Start: 2024-11-08

## 2024-12-20 NOTE — PROGRESS NOTES
Subjective   Giovanni Shin is a 54 y.o. female.   Chief Complaint   Patient presents with    Anxiety and depression       History of Present Illness     Anxiety/Depression:  - Follow-up from 6/3/2024: Patient was on Cymbalta 60 mg daily, she stated there were some days she was perkins but she felt that the medication overall worked well and she wanted to stay on it.    - patient presents today for routine 6-month check  -Associated symptoms include: depressed mood and anxiety   -Severity is described per patient : Mild to moderate  -Patient states she cannot tell if medication is working. Feels she has more lack of emotion. The Original reason to  start cymbalta was for her back pain (still there). At that time, she didn't tell anyone, but was suicidal, considered leaving her family.  Cymbalta 60 mg got her through that time                Preventative  - Offered Influenza and COVID vaccines, but pt declines      The following portions of the patient's history were reviewed and updated as appropriate: allergies, current medications, past family history, past medical history, past social history, past surgical history, and problem list.    Patient Active Problem List   Diagnosis    Age-related cataract of both eyes    Allergic rhinitis due to pollen    Anxiety and depression    Arthralgia of multiple joints    Gastroesophageal reflux disease without esophagitis    Heterozygous factor V Leiden mutation    History of DVT (deep vein thrombosis)    History of pulmonary embolism    Hypertension, benign    Lumbar disc disease with radiculopathy    Class 2 severe obesity due to excess calories with serious comorbidity and body mass index (BMI) of 36.0 to 36.9 in adult    Primary osteoarthritis of left knee    History of tobacco use    Fibromyalgia    Primary osteoarthritis of left hip    Chronic bilateral low back pain with bilateral sciatica    Asymptomatic menopausal state    Presence of 52 mg levonorgestrel-releasing  "intrauterine device (IUD)    Menorrhagia with regular cycle    Hyperplastic polyp of descending colon    Prediabetes    DDD (degenerative disc disease), cervical    Mixed hyperlipidemia       Current Outpatient Medications on File Prior to Visit   Medication Sig Dispense Refill    acetaminophen (TYLENOL) 650 MG 8 hr tablet Take 2 tablets by mouth Every 8 (Eight) Hours As Needed for Mild Pain.      atenolol (TENORMIN) 50 MG tablet Take 1 tablet by mouth daily. 90 tablet 3    fluticasone (FLONASE) 50 MCG/ACT nasal spray 2 sprays into the nostril(s) as directed by provider Daily. 18 mL 12    loratadine (CLARITIN) 10 MG tablet Take 1 tablet by mouth Daily.      losartan (COZAAR) 50 MG tablet Take 1 tablet by mouth Daily. 90 tablet 3    Mirena, 52 MG, 20 MCG/24HR IUD       pseudoephedrine (SUDAFED) 30 MG tablet Take 1 tablet by mouth Every 4 (Four) Hours.      RABEprazole (ACIPHEX) 20 MG EC tablet TAKE 1 TABLET BY MOUTH ONCE DAILY AS NEEDED FOR GERD 90 tablet 3    Xarelto 20 MG tablet Take 1 tablet by mouth Daily.      [DISCONTINUED] DULoxetine (CYMBALTA) 60 MG capsule Take 1 capsule by mouth Daily. 90 capsule 1    [DISCONTINUED] AMOXICILLIN PO Take  by mouth.       No current facility-administered medications on file prior to visit.     Current outpatient and discharge medications have been reconciled for the patient.  Reviewed by: Clair Fonseca DO      Allergies   Allergen Reactions    Belbuca [Buprenorphine Hcl] Dizziness     MAKES SLEEPY    Gabapentin Other (See Comments)     DROWSINESS    Hysingla Er [Hydrocodone Bitartrate Er] Dizziness     MAKES PATIENT SLEEPY     Lyrica [Pregabalin] Unknown - Low Severity     WEIGHT GAIN AND MAKES SLEEPY.          Objective   Visit Vitals  /60 (BP Location: Left arm, Patient Position: Sitting, Cuff Size: Large Adult)   Pulse 83   Temp 98.6 °F (37 °C) (Skin)   Resp 18   Ht 167.6 cm (66\")   Wt 106 kg (234 lb 3.2 oz)   SpO2 96%   BMI 37.80 kg/m²       Physical Exam  HENT:    "   Head: Normocephalic and atraumatic.   Eyes:      Conjunctiva/sclera: Conjunctivae normal.   Neurological:      General: No focal deficit present.      Mental Status: She is alert and oriented to person, place, and time.   Psychiatric:         Mood and Affect: Mood normal.         Behavior: Behavior normal.           Diagnoses and all orders for this visit:    1. Anxiety and depression (Primary)  -Patient describing feeling more emotionless on her current Cymbalta 60 mg daily  - Decreasing Cymbalta from 60 mg to DULoxetine (Cymbalta) 30 MG capsule; Take 1 capsule by mouth Daily.  Dispense: 30 capsule; Refill: 1    2. Class 2 severe obesity due to excess calories with serious comorbidity and body mass index (BMI) of 37.0 to 37.9 in adult               Follow Up  -1 month for anxiety and depression    Expected course, medications, and adverse effects discussed as appropriate.  Call or return if worsening or persistent symptoms. Hand hygiene was performed before donning protective equipment and after removal when leaving the room.    This document is intended for medical expert use only. Reading of this document by patients and/or patient's family without participating medical staff guidance may result in misinterpretation and unintended morbidity. Any interpretation of such data is the responsibility of the patient and/or family member responsible for the patient in concert with their primary or specialist providers, not to be left for sources of online searches such as [employer], [employer] or similar queries. Relying on these approaches to knowledge may result in misinterpretation, misguided goals of care and even death should patients or family members try recommendations outside of the realm of professional medical care.

## 2024-12-23 ENCOUNTER — OFFICE VISIT (OUTPATIENT)
Dept: FAMILY MEDICINE CLINIC | Facility: CLINIC | Age: 54
End: 2024-12-23
Payer: COMMERCIAL

## 2024-12-23 VITALS
WEIGHT: 234.2 LBS | RESPIRATION RATE: 18 BRPM | BODY MASS INDEX: 37.64 KG/M2 | SYSTOLIC BLOOD PRESSURE: 120 MMHG | HEIGHT: 66 IN | HEART RATE: 83 BPM | TEMPERATURE: 98.6 F | DIASTOLIC BLOOD PRESSURE: 60 MMHG | OXYGEN SATURATION: 96 %

## 2024-12-23 DIAGNOSIS — E66.812 CLASS 2 SEVERE OBESITY DUE TO EXCESS CALORIES WITH SERIOUS COMORBIDITY AND BODY MASS INDEX (BMI) OF 37.0 TO 37.9 IN ADULT: ICD-10-CM

## 2024-12-23 DIAGNOSIS — F41.9 ANXIETY AND DEPRESSION: Primary | ICD-10-CM

## 2024-12-23 DIAGNOSIS — F32.A ANXIETY AND DEPRESSION: Primary | ICD-10-CM

## 2024-12-23 DIAGNOSIS — E66.01 CLASS 2 SEVERE OBESITY DUE TO EXCESS CALORIES WITH SERIOUS COMORBIDITY AND BODY MASS INDEX (BMI) OF 37.0 TO 37.9 IN ADULT: ICD-10-CM

## 2024-12-23 PROCEDURE — 99213 OFFICE O/P EST LOW 20 MIN: CPT | Performed by: STUDENT IN AN ORGANIZED HEALTH CARE EDUCATION/TRAINING PROGRAM

## 2024-12-23 RX ORDER — DULOXETIN HYDROCHLORIDE 60 MG/1
60 CAPSULE, DELAYED RELEASE ORAL DAILY
Qty: 90 CAPSULE | Refills: 1 | Status: CANCELLED | OUTPATIENT
Start: 2024-12-23

## 2024-12-23 RX ORDER — DULOXETIN HYDROCHLORIDE 30 MG/1
30 CAPSULE, DELAYED RELEASE ORAL DAILY
Qty: 30 CAPSULE | Refills: 1 | Status: SHIPPED | OUTPATIENT
Start: 2024-12-23

## 2024-12-31 DIAGNOSIS — K21.9 GASTROESOPHAGEAL REFLUX DISEASE WITHOUT ESOPHAGITIS: ICD-10-CM

## 2024-12-31 RX ORDER — RABEPRAZOLE SODIUM 20 MG/1
20 TABLET, DELAYED RELEASE ORAL DAILY
Qty: 90 TABLET | Refills: 3 | Status: SHIPPED | OUTPATIENT
Start: 2024-12-31

## 2024-12-31 NOTE — TELEPHONE ENCOUNTER
Sent in 90-day supply with 3 refills of this medication on 11/8/2024 to [facility] pharmacy in [place].  Received another refill request for the same pharmacy.  Resending year supply to same pharmacy

## 2025-01-20 ENCOUNTER — OFFICE VISIT (OUTPATIENT)
Dept: FAMILY MEDICINE CLINIC | Facility: CLINIC | Age: 55
End: 2025-01-20
Payer: COMMERCIAL

## 2025-01-20 VITALS
OXYGEN SATURATION: 98 % | DIASTOLIC BLOOD PRESSURE: 60 MMHG | TEMPERATURE: 98.9 F | WEIGHT: 233.8 LBS | SYSTOLIC BLOOD PRESSURE: 120 MMHG | HEIGHT: 66 IN | RESPIRATION RATE: 16 BRPM | HEART RATE: 78 BPM | BODY MASS INDEX: 37.57 KG/M2

## 2025-01-20 DIAGNOSIS — E66.812 CLASS 2 SEVERE OBESITY DUE TO EXCESS CALORIES WITH SERIOUS COMORBIDITY AND BODY MASS INDEX (BMI) OF 37.0 TO 37.9 IN ADULT: ICD-10-CM

## 2025-01-20 DIAGNOSIS — F32.A ANXIETY AND DEPRESSION: Primary | ICD-10-CM

## 2025-01-20 DIAGNOSIS — F41.9 ANXIETY AND DEPRESSION: Primary | ICD-10-CM

## 2025-01-20 DIAGNOSIS — E66.01 CLASS 2 SEVERE OBESITY DUE TO EXCESS CALORIES WITH SERIOUS COMORBIDITY AND BODY MASS INDEX (BMI) OF 37.0 TO 37.9 IN ADULT: ICD-10-CM

## 2025-01-20 PROCEDURE — 99213 OFFICE O/P EST LOW 20 MIN: CPT | Performed by: STUDENT IN AN ORGANIZED HEALTH CARE EDUCATION/TRAINING PROGRAM

## 2025-01-20 NOTE — PROGRESS NOTES
Subjective   Giovanni Shin is a 54 y.o. female.   Chief Complaint   Patient presents with    Anxiety       History of Present Illness     Anxiety and depression  -Follow-up from 12/23/2024: Was on Cymbalta 60 mg but she could not tell if the medication was working, Felt more lack of emotion.  Started Cymbalta to help with anxiety and depression as well as back pain but back pain is unchanged.  Decrease Cymbalta from 60 mg daily to 30 mg daily  Today  - Pt states she has not noticed any change with decrease of medication. Emotions are a little better but not much.   -States she is crying easily and gets frustrated easily.  However feels that this is part of her normal hormone cycle despite Mirena use (which has been in place for a few years, originally placed for heavy periods)  -Patient voiced she would like to try stopping the Cymbalta to see how her mood is on the Mirena alone        The following portions of the patient's history were reviewed and updated as appropriate: allergies, current medications, past family history, past medical history, past social history, past surgical history, and problem list.    Patient Active Problem List   Diagnosis    Age-related cataract of both eyes    Allergic rhinitis due to pollen    Anxiety and depression    Arthralgia of multiple joints    Gastroesophageal reflux disease without esophagitis    Heterozygous factor V Leiden mutation    History of DVT (deep vein thrombosis)    History of pulmonary embolism    Hypertension, benign    Lumbar disc disease with radiculopathy    Class 2 severe obesity due to excess calories with serious comorbidity and body mass index (BMI) of 36.0 to 36.9 in adult    Primary osteoarthritis of left knee    History of tobacco use    Fibromyalgia    Primary osteoarthritis of left hip    Chronic bilateral low back pain with bilateral sciatica    Asymptomatic menopausal state    Presence of 52 mg levonorgestrel-releasing intrauterine device (IUD)     "Menorrhagia with regular cycle    Hyperplastic polyp of descending colon    Prediabetes    DDD (degenerative disc disease), cervical    Mixed hyperlipidemia       Current Outpatient Medications on File Prior to Visit   Medication Sig Dispense Refill    acetaminophen (TYLENOL) 650 MG 8 hr tablet Take 2 tablets by mouth Every 8 (Eight) Hours As Needed for Mild Pain.      atenolol (TENORMIN) 50 MG tablet Take 1 tablet by mouth daily. 90 tablet 3    DULoxetine (Cymbalta) 30 MG capsule Take 1 capsule by mouth Daily. 30 capsule 1    fluticasone (FLONASE) 50 MCG/ACT nasal spray 2 sprays into the nostril(s) as directed by provider Daily. 18 mL 12    loratadine (CLARITIN) 10 MG tablet Take 1 tablet by mouth Daily.      losartan (COZAAR) 50 MG tablet Take 1 tablet by mouth Daily. 90 tablet 3    Mirena, 52 MG, 20 MCG/24HR IUD       pseudoephedrine (SUDAFED) 30 MG tablet Take 1 tablet by mouth Every 4 (Four) Hours.      RABEprazole (ACIPHEX) 20 MG EC tablet Take 1 tablet by mouth Daily. 90 tablet 3    Xarelto 20 MG tablet Take 1 tablet by mouth Daily.       No current facility-administered medications on file prior to visit.     Current outpatient and discharge medications have been reconciled for the patient.  Reviewed by: Clair Fonseca DO      Allergies   Allergen Reactions    Belbuca [Buprenorphine Hcl] Dizziness     MAKES SLEEPY    Gabapentin Other (See Comments)     DROWSINESS    Hysingla Er [Hydrocodone Bitartrate Er] Dizziness     MAKES PATIENT SLEEPY     Lyrica [Pregabalin] Unknown - Low Severity     WEIGHT GAIN AND MAKES SLEEPY.          Objective   Visit Vitals  /60 (BP Location: Right arm, Patient Position: Sitting, Cuff Size: Large Adult)   Pulse 78   Temp 98.9 °F (37.2 °C) (Skin)   Resp 16   Ht 167.6 cm (66\")   Wt 106 kg (233 lb 12.8 oz)   SpO2 98%   BMI 37.74 kg/m²       Physical Exam  HENT:      Head: Normocephalic and atraumatic.   Eyes:      Conjunctiva/sclera: Conjunctivae normal.   Neurological:      " General: No focal deficit present.      Mental Status: She is alert and oriented to person, place, and time.   Psychiatric:         Mood and Affect: Mood normal.         Behavior: Behavior normal.           Diagnoses and all orders for this visit:    1. Anxiety and depression (Primary)  -Stopping Cymbalta 30 mg daily.  Told patient if she is really struggling off medication entirely to message me and let me know and I can start her on something like citalopram as she feels that she might be going through menopause  -Will follow-up in 6 to 8 weeks to see how she is doing emotionally and decide whether we want to start any medications or keep her off medication entirely    2. Class 2 severe obesity due to excess calories with serious comorbidity and body mass index (BMI) of 37.0 to 37.9 in adult  Class 2 Severe Obesity (BMI >=35 and <=39.9). Obesity-related health conditions include the following: hypertension. Obesity is unchanged. BMI is is above average; BMI management plan is completed. We discussed portion control and increasing exercise.       Follow Up  - 6/3/25 or later for annual exam  - 6-8 weeks for anxiety and depression check    Expected course, medications, and adverse effects discussed as appropriate.  Call or return if worsening or persistent symptoms. Hand hygiene was performed before donning protective equipment and after removal when leaving the room.    This document is intended for medical expert use only. Reading of this document by patients and/or patient's family without participating medical staff guidance may result in misinterpretation and unintended morbidity. Any interpretation of such data is the responsibility of the patient and/or family member responsible for the patient in concert with their primary or specialist providers, not to be left for sources of online searches such as [employer], [place] [employer] or similar queries. Relying on these approaches to knowledge may result in misinterpretation,  misguided goals of care and even death should patients or family members try recommendations outside of the realm of professional medical care.

## 2025-01-23 ENCOUNTER — TELEPHONE (OUTPATIENT)
Dept: FAMILY MEDICINE CLINIC | Facility: CLINIC | Age: 55
End: 2025-01-23
Payer: COMMERCIAL

## 2025-01-23 NOTE — TELEPHONE ENCOUNTER
Caller: Giovanni Shin    Relationship: Self    Best call back number:     [phone]        What was the call regarding:   RIGHT ANKLE PAIN FOR OVER 1 MONTH.   NO APPOINTMENTS AVAILABLE UNTIL FEB 2025.    CAN THE ANKLE BE X-RAYED? PLEASE ADVISE

## 2025-01-27 ENCOUNTER — OFFICE VISIT (OUTPATIENT)
Dept: FAMILY MEDICINE CLINIC | Facility: CLINIC | Age: 55
End: 2025-01-27
Payer: COMMERCIAL

## 2025-01-27 VITALS
BODY MASS INDEX: 37.35 KG/M2 | WEIGHT: 232.4 LBS | SYSTOLIC BLOOD PRESSURE: 110 MMHG | DIASTOLIC BLOOD PRESSURE: 76 MMHG | HEIGHT: 66 IN | HEART RATE: 80 BPM | RESPIRATION RATE: 18 BRPM | TEMPERATURE: 96.9 F | OXYGEN SATURATION: 94 %

## 2025-01-27 DIAGNOSIS — G89.29 CHRONIC PAIN OF RIGHT ANKLE: Primary | ICD-10-CM

## 2025-01-27 DIAGNOSIS — M25.571 CHRONIC PAIN OF RIGHT ANKLE: Primary | ICD-10-CM

## 2025-01-27 PROCEDURE — 99213 OFFICE O/P EST LOW 20 MIN: CPT

## 2025-01-27 NOTE — PROGRESS NOTES
Office Note     Name: Giovanni Shin    : 1970     MRN: [id number]     Chief Complaint  Ankle Pain (Chronic ankle pain)    Subjective     Giovanni Shin presents to [facility] for an acute complaint of chronic ankle pain. Patient states has had this pain for more then a month with no relief.     Ankle Pain   The incident occurred more than 1 week ago. There was no injury mechanism. The pain is present in the right ankle. The quality of the pain is described as aching and stabbing. The pain is moderate. The pain has been Constant since onset. Associated symptoms include an inability to bear weight and numbness. She reports no foreign bodies present. The symptoms are aggravated by movement and weight bearing. She has tried nothing for the symptoms. The treatment provided no relief.     Ice helps.  Worse with walking and standing.       History of Present Illness  The patient is a 54-year-old female who presents for evaluation of right ankle pain. Her primary care provider is Dr. Clair Fonseca.    She has been experiencing chronic right ankle pain for approximately one month, with no known precipitating factors such as falls, trips, or accidents. The pain is localized to the right posteriorlateral aspect of the lateral malleolus. She reports swelling in the unaffected ankle, which she attributes to a history of blood clots in the left leg. The pain is severe enough to disrupt her sleep. Despite these self-care measures, the pain persisted upon her return to work after the winter break. She frequently wears clogs, which do not provide adequate shock absorption. She has attempted to alleviate the pain with ice, which provides some relief.    SOCIAL HISTORY  She works as a teacher.    MEDICATIONS  Current: Xarelto, Tylenol         Current Outpatient Medications:     acetaminophen (TYLENOL) 650 MG 8 hr tablet, Take 2 tablets by mouth Every 8 (Eight) Hours As Needed for Mild  "Pain., Disp: , Rfl:     atenolol (TENORMIN) 50 MG tablet, Take 1 tablet by mouth daily., Disp: 90 tablet, Rfl: 3    fluticasone (FLONASE) 50 MCG/ACT nasal spray, 2 sprays into the nostril(s) as directed by provider Daily., Disp: 18 mL, Rfl: 12    loratadine (CLARITIN) 10 MG tablet, Take 1 tablet by mouth Daily., Disp: , Rfl:     losartan (COZAAR) 50 MG tablet, Take 1 tablet by mouth Daily., Disp: 90 tablet, Rfl: 3    Mirena, 52 MG, 20 MCG/24HR IUD, , Disp: , Rfl:     pseudoephedrine (SUDAFED) 30 MG tablet, Take 1 tablet by mouth Every 4 (Four) Hours., Disp: , Rfl:     RABEprazole (ACIPHEX) 20 MG EC tablet, Take 1 tablet by mouth Daily., Disp: 90 tablet, Rfl: 3    Xarelto 20 MG tablet, Take 1 tablet by mouth Daily., Disp: , Rfl:     Allergies   Allergen Reactions    Belbuca [Buprenorphine Hcl] Dizziness     MAKES SLEEPY    Gabapentin Other (See Comments)     DROWSINESS    Hysingla Er [Hydrocodone Bitartrate Er] Dizziness     MAKES PATIENT SLEEPY     Lyrica [Pregabalin] Unknown - Low Severity     WEIGHT GAIN AND MAKES SLEEPY.              1/27/2025     4:25 PM   PHQ-2/PHQ-9 Depression Screening   Little interest or pleasure in doing things Not at all   Feeling down, depressed, or hopeless Not at all   How difficult have these problems made it for you to do your work, take care of things at home, or get along with other people? Not difficult at all       Review of Systems   Musculoskeletal:  Positive for arthralgias and joint swelling. Negative for gait problem.   Neurological:  Positive for numbness.       Objective     /76 (BP Location: Right arm, Patient Position: Sitting, Cuff Size: Large Adult)   Pulse 80   Temp 96.9 °F (36.1 °C) (Temporal)   Resp 18   Ht 167.6 cm (65.98\")   Wt 105 kg (232 lb 6.4 oz)   SpO2 94%   BMI 37.53 kg/m²          Physical Exam  Vitals and nursing note reviewed.   Constitutional:       General: She is not in acute distress.     Appearance: Normal appearance. She is not " ill-appearing, toxic-appearing or diaphoretic.   Musculoskeletal:      Right ankle: No deformity. Tenderness present. Normal range of motion.      Left ankle: Normal.        Legs:       Comments: Slightly tender along lateral malleolus     Neurological:      Mental Status: She is alert.       Derm Physical Exam  Physical Exam       Result Review   Results       Assessment and Plan       Assessment & Plan  Chronic pain of right ankle    Orders:    XR Ankle 3+ View Right      Assessment & Plan  1. Right ankle pain.  The patient reports chronic right ankle pain for about a month without any known trauma or injury. The pain sometimes extends to her foot and occurs even at night. She has tried using ice for relief. Physical examination reveals slight puffiness and pain upon dorsiflexion. She is advised to wear tennis shoes for better shock absorption and use an Ace wrap for additional support.  She is unable to tolerate anti-inflammatories due to anticoagulant therapy.  An x-ray of the right ankle will be ordered to further investigate the cause of the pain. If the x-ray is inconclusive, an MRI may be considered to check for tendon or ligament issues.      Procedures     {Instructions Charge Capture  Follow-up Communications     Wrapup Tab  Return for Recheck, Follow up with primary care provider as needed..     There are no Patient Instructions on file for this visit.   Patient was given instructions and counseling regarding her condition or for health maintenance advice. Please see specific information pulled into the AVS if appropriate.  Hand hygiene was performed during entrance to exam room and following assessment of patient. This document is intended for medical expert use only.     EMR Dragon/Transcription disclaimer:   Much of this encounter note is an electronic transcription/translation of spoken language to printed text. The electronic translation of spoken language may permit erroneous, or at times,  nonsensical words or phrases to be inadvertently transcribed.      AMADOU Hines, FNP-C  [address]  [facility]  [address] [place] [address] KATHARINA ROLDAN [address] [place] [id number] [phone]    Patient or patient representative verbalized consent for the use of Ambient Listening during the visit with  AMADOU Hines for chart documentation. 1/27/2025  16:43 EST

## 2025-01-28 ENCOUNTER — PATIENT MESSAGE (OUTPATIENT)
Dept: FAMILY MEDICINE CLINIC | Facility: CLINIC | Age: 55
End: 2025-01-28
Payer: COMMERCIAL

## 2025-01-28 ENCOUNTER — TELEPHONE (OUTPATIENT)
Dept: FAMILY MEDICINE CLINIC | Facility: CLINIC | Age: 55
End: 2025-01-28
Payer: COMMERCIAL

## 2025-01-28 DIAGNOSIS — F32.A ANXIETY AND DEPRESSION: Primary | ICD-10-CM

## 2025-01-28 DIAGNOSIS — F41.9 ANXIETY AND DEPRESSION: Primary | ICD-10-CM

## 2025-01-28 NOTE — TELEPHONE ENCOUNTER
"Per patient via [employer],  \"Since stopping the medicine last Monday, on the 20th, I've been having all the feels. I have been teary eyed. I've been emotional, crying at things I would not normally cry about, uh, getting angry about things I wouldn't get angry about.. I am not sure if I need to maybe go back on the medicine, but this is making me nuts.\"  "

## 2025-01-30 RX ORDER — SERTRALINE HYDROCHLORIDE 25 MG/1
25 TABLET, FILM COATED ORAL DAILY
Qty: 30 TABLET | Refills: 1 | Status: SHIPPED | OUTPATIENT
Start: 2025-01-30

## 2025-01-30 NOTE — TELEPHONE ENCOUNTER
Looking her chart, it does not look like she has tried sertraline before.  Sent in a starting dose to her pharmacy.  She already has an appointment set up within about a month's time and we can check in on her anxiety

## 2025-01-31 ENCOUNTER — PATIENT MESSAGE (OUTPATIENT)
Dept: FAMILY MEDICINE CLINIC | Facility: CLINIC | Age: 55
End: 2025-01-31
Payer: COMMERCIAL

## 2025-02-03 ENCOUNTER — TELEPHONE (OUTPATIENT)
Dept: FAMILY MEDICINE CLINIC | Facility: CLINIC | Age: 55
End: 2025-02-03
Payer: COMMERCIAL

## 2025-02-03 NOTE — TELEPHONE ENCOUNTER
FELIZ message:          I am seeing information that zoloft could cause bleeding problems. Do I need to be extra alert about  my F5 Leiden when taking this medicine?

## 2025-03-03 ENCOUNTER — OFFICE VISIT (OUTPATIENT)
Dept: FAMILY MEDICINE CLINIC | Facility: CLINIC | Age: 55
End: 2025-03-03
Payer: COMMERCIAL

## 2025-03-03 VITALS
SYSTOLIC BLOOD PRESSURE: 110 MMHG | RESPIRATION RATE: 16 BRPM | WEIGHT: 232 LBS | TEMPERATURE: 97.3 F | HEIGHT: 66 IN | HEART RATE: 69 BPM | BODY MASS INDEX: 37.28 KG/M2 | OXYGEN SATURATION: 98 % | DIASTOLIC BLOOD PRESSURE: 68 MMHG

## 2025-03-03 DIAGNOSIS — F41.9 ANXIETY AND DEPRESSION: Primary | ICD-10-CM

## 2025-03-03 DIAGNOSIS — E66.812 CLASS 2 OBESITY DUE TO EXCESS CALORIES WITHOUT SERIOUS COMORBIDITY WITH BODY MASS INDEX (BMI) OF 37.0 TO 37.9 IN ADULT: ICD-10-CM

## 2025-03-03 DIAGNOSIS — F32.A ANXIETY AND DEPRESSION: Primary | ICD-10-CM

## 2025-03-03 DIAGNOSIS — E66.09 CLASS 2 OBESITY DUE TO EXCESS CALORIES WITHOUT SERIOUS COMORBIDITY WITH BODY MASS INDEX (BMI) OF 37.0 TO 37.9 IN ADULT: ICD-10-CM

## 2025-03-03 NOTE — PROGRESS NOTES
Subjective   Giovanni Shin is a 55 y.o. female.   Chief Complaint   Patient presents with    anxiety and depression       History of Present Illness     Anxiety/Depression:  -Follow up from 01/20/2025: Initially patient felt a little emotionless on Cymbalta 60 mg, reduced down to 30 mg. Did not notice much improvement but was getting easily frustrated and crying.  She felt this is due to her hormone cycle, she wanted to stop the Cymbalta entirely to see how she reacted on the Mirena alone.  -1/28/2025: Patient sent a message stating that since she stopped her Cymbalta entirely, she has been very emotional, out-of-control crying. She was agreeable to another medication.  Sent in sertraline 25 mg for patient to trial  Today  -Associated symptoms include: depressed mood and anxiety   -Severity is described per patient : Mild to moderate/severe  -Patient states medications are  working well, but dose may need to be adjusted.  Is unsure if she wants to go up on the dose or not  -Has noticed she tends to have more headaches since starting this medication        Preventative:  -Offered Prevnar 20, but pt declines    The following portions of the patient's history were reviewed and updated as appropriate: allergies, current medications, past family history, past medical history, past social history, past surgical history, and problem list.    Patient Active Problem List   Diagnosis    Age-related cataract of both eyes    Allergic rhinitis due to pollen    Anxiety and depression    Arthralgia of multiple joints    Gastroesophageal reflux disease without esophagitis    Heterozygous factor V Leiden mutation    History of DVT (deep vein thrombosis)    History of pulmonary embolism    Hypertension, benign    Lumbar disc disease with radiculopathy    Class 2 severe obesity due to excess calories with serious comorbidity and body mass index (BMI) of 36.0 to 36.9 in adult    Primary osteoarthritis of left knee    History of  "tobacco use    Fibromyalgia    Primary osteoarthritis of left hip    Chronic bilateral low back pain with bilateral sciatica    Asymptomatic menopausal state    Presence of 52 mg levonorgestrel-releasing intrauterine device (IUD)    Menorrhagia with regular cycle    Hyperplastic polyp of descending colon    Prediabetes    DDD (degenerative disc disease), cervical    Mixed hyperlipidemia       Current Outpatient Medications on File Prior to Visit   Medication Sig Dispense Refill    acetaminophen (TYLENOL) 650 MG 8 hr tablet Take 2 tablets by mouth Every 8 (Eight) Hours As Needed for Mild Pain.      atenolol (TENORMIN) 50 MG tablet Take 1 tablet by mouth daily. 90 tablet 3    fluticasone (FLONASE) 50 MCG/ACT nasal spray 2 sprays into the nostril(s) as directed by provider Daily. 18 mL 12    loratadine (CLARITIN) 10 MG tablet Take 1 tablet by mouth Daily.      losartan (COZAAR) 50 MG tablet Take 1 tablet by mouth Daily. 90 tablet 3    Mirena, 52 MG, 20 MCG/24HR IUD       pseudoephedrine (SUDAFED) 30 MG tablet Take 1 tablet by mouth Every 4 (Four) Hours.      RABEprazole (ACIPHEX) 20 MG EC tablet Take 1 tablet by mouth Daily. 90 tablet 3    Xarelto 20 MG tablet Take 1 tablet by mouth Daily.       No current facility-administered medications on file prior to visit.     Current outpatient and discharge medications have been reconciled for the patient.  Reviewed by: Clair Fonseca DO      Allergies   Allergen Reactions    Belbuca [Buprenorphine Hcl] Dizziness     MAKES SLEEPY    Gabapentin Other (See Comments)     DROWSINESS    Hysingla Er [Hydrocodone Bitartrate Er] Dizziness     MAKES PATIENT SLEEPY     Lyrica [Pregabalin] Unknown - Low Severity     WEIGHT GAIN AND MAKES SLEEPY.          Objective   Visit Vitals  /68 (BP Location: Left arm, Patient Position: Sitting, Cuff Size: Large Adult)   Pulse 69   Temp 97.3 °F (36.3 °C) (Skin)   Resp 16   Ht 167.6 cm (65.98\")   Wt 105 kg (232 lb)   SpO2 98%   BMI 37.47 kg/m² "       Physical Exam  HENT:      Head: Normocephalic and atraumatic.   Eyes:      Conjunctiva/sclera: Conjunctivae normal.   Neurological:      General: No focal deficit present.      Mental Status: She is alert and oriented to person, place, and time.   Psychiatric:         Mood and Affect: Mood normal.         Behavior: Behavior normal.           Diagnoses and all orders for this visit:    1. Anxiety and depression (Primary)  -Discussed with patient that if she feels better but she is not sure if she would improve on a higher dose, would we can try higher dose and if she feels worse or no better, we can go back to the 25 mg instead. Pt agreeable  -Increased sertraline from 25 mg to sertraline (ZOLOFT) 50 MG tablet; Take 1 tablet by mouth Daily.  Dispense: 30 tablet; Refill: 1  -Discussed with patient the headaches might be a reaction to the sertraline, if she has had increased stress that could also cause headaches, the weather has been changing from winter to spring and she has allergies which may also be contributing.  If she has increased headaches or other side effects that are concerning, encouraged her to reach out to the office and we can change her medication back or switch to a different medication for seeing her next    2. Class 2 obesity due to excess calories without serious comorbidity with body mass index (BMI) of 37.0 to 37.9 in adult  Class 2 Severe Obesity (BMI >=35 and <=39.9). Obesity-related health conditions include the following: hypertension and GERD. Obesity is unchanged. BMI is is above average; BMI management plan is completed. We discussed portion control and increasing exercise.                   Follow Up  - 4-6 weeks anxiety and depression    Expected course, medications, and adverse effects discussed as appropriate.  Call or return if worsening or persistent symptoms. Hand hygiene was performed before donning protective equipment and after removal when leaving the room.    This  document is intended for medical expert use only. Reading of this document by patients and/or patient's family without participating medical staff guidance may result in misinterpretation and unintended morbidity. Any interpretation of such data is the responsibility of the patient and/or family member responsible for the patient in concert with their primary or specialist providers, not to be left for sources of online searches such as [employer], [employer] or similar queries. Relying on these approaches to knowledge may result in misinterpretation, misguided goals of care and even death should patients or family members try recommendations outside of the realm of professional medical care.

## 2025-03-21 DIAGNOSIS — I10 HYPERTENSION, BENIGN: ICD-10-CM

## 2025-03-25 RX ORDER — ATENOLOL 50 MG/1
50 TABLET ORAL DAILY
Qty: 90 TABLET | Refills: 3 | Status: SHIPPED | OUTPATIENT
Start: 2025-03-25

## 2025-03-25 RX ORDER — LOSARTAN POTASSIUM 50 MG/1
50 TABLET ORAL DAILY
Qty: 90 TABLET | Refills: 3 | Status: SHIPPED | OUTPATIENT
Start: 2025-03-25

## 2025-04-08 ENCOUNTER — OFFICE VISIT (OUTPATIENT)
Dept: FAMILY MEDICINE CLINIC | Facility: CLINIC | Age: 55
End: 2025-04-08
Payer: COMMERCIAL

## 2025-04-08 VITALS
TEMPERATURE: 98 F | HEIGHT: 66 IN | RESPIRATION RATE: 16 BRPM | BODY MASS INDEX: 37.77 KG/M2 | OXYGEN SATURATION: 98 % | DIASTOLIC BLOOD PRESSURE: 60 MMHG | WEIGHT: 235 LBS | SYSTOLIC BLOOD PRESSURE: 108 MMHG | HEART RATE: 86 BPM

## 2025-04-08 DIAGNOSIS — F32.A ANXIETY AND DEPRESSION: Primary | ICD-10-CM

## 2025-04-08 DIAGNOSIS — F41.9 ANXIETY AND DEPRESSION: Primary | ICD-10-CM

## 2025-04-08 DIAGNOSIS — E66.812 CLASS 2 OBESITY DUE TO EXCESS CALORIES WITHOUT SERIOUS COMORBIDITY WITH BODY MASS INDEX (BMI) OF 37.0 TO 37.9 IN ADULT: ICD-10-CM

## 2025-04-08 DIAGNOSIS — E66.09 CLASS 2 OBESITY DUE TO EXCESS CALORIES WITHOUT SERIOUS COMORBIDITY WITH BODY MASS INDEX (BMI) OF 37.0 TO 37.9 IN ADULT: ICD-10-CM

## 2025-04-08 NOTE — PROGRESS NOTES
Subjective   Giovanni Shin is a 55 y.o. female.   Chief Complaint   Patient presents with    Anxiety and depression       History of Present Illness      Anxiety and depression   -Follow up from 03/03/2025: Felt emotionless on Cymbalta 60 mg, on Cymbalta 30 mg did not notice much improvement and got more emotional.  Patient stopped Cymbalta entirely on her own and got very emotional, sent in sertraline 25 mg as an alternative (Could not tell much difference with the switch).  Did have some headaches  -Increased sertraline from 25 mg to 50 mg  Today  - Pt states some improvement with increase of medication, would like to stay on this dose for now        The following portions of the patient's history were reviewed and updated as appropriate: allergies, current medications, past family history, past medical history, past social history, past surgical history, and problem list.    Patient Active Problem List   Diagnosis    Age-related cataract of both eyes    Allergic rhinitis due to pollen    Anxiety and depression    Arthralgia of multiple joints    Gastroesophageal reflux disease without esophagitis    Heterozygous factor V Leiden mutation    History of DVT (deep vein thrombosis)    History of pulmonary embolism    Hypertension, benign    Lumbar disc disease with radiculopathy    Class 2 severe obesity due to excess calories with serious comorbidity and body mass index (BMI) of 36.0 to 36.9 in adult    Primary osteoarthritis of left knee    History of tobacco use    Fibromyalgia    Primary osteoarthritis of left hip    Chronic bilateral low back pain with bilateral sciatica    Asymptomatic menopausal state    Presence of 52 mg levonorgestrel-releasing intrauterine device (IUD)    Menorrhagia with regular cycle    Hyperplastic polyp of descending colon    Prediabetes    DDD (degenerative disc disease), cervical    Mixed hyperlipidemia       Current Outpatient Medications on File Prior to Visit   Medication Sig  "Dispense Refill    acetaminophen (TYLENOL) 650 MG 8 hr tablet Take 2 tablets by mouth Every 8 (Eight) Hours As Needed for Mild Pain.      atenolol (TENORMIN) 50 MG tablet Take 1 tablet by mouth once daily 90 tablet 3    fluticasone (FLONASE) 50 MCG/ACT nasal spray 2 sprays into the nostril(s) as directed by provider Daily. 18 mL 12    loratadine (CLARITIN) 10 MG tablet Take 1 tablet by mouth Daily.      losartan (COZAAR) 50 MG tablet Take 1 tablet by mouth once daily 90 tablet 3    Mirena, 52 MG, 20 MCG/24HR IUD       pseudoephedrine (SUDAFED) 30 MG tablet Take 1 tablet by mouth Every 4 (Four) Hours.      RABEprazole (ACIPHEX) 20 MG EC tablet Take 1 tablet by mouth Daily. 90 tablet 3    Xarelto 20 MG tablet Take 1 tablet by mouth Daily.      [DISCONTINUED] sertraline (ZOLOFT) 50 MG tablet Take 1 tablet by mouth Daily. 30 tablet 1     No current facility-administered medications on file prior to visit.     Current outpatient and discharge medications have been reconciled for the patient.  Reviewed by: Clair Fonseca DO      Allergies   Allergen Reactions    Belbuca [Buprenorphine Hcl] Dizziness     MAKES SLEEPY    Gabapentin Other (See Comments)     DROWSINESS    Hysingla Er [Hydrocodone Bitartrate Er] Dizziness     MAKES PATIENT SLEEPY     Lyrica [Pregabalin] Unknown - Low Severity     WEIGHT GAIN AND MAKES SLEEPY.          Objective   Visit Vitals  /60 (BP Location: Left arm, Patient Position: Sitting, Cuff Size: Large Adult)   Pulse 86   Temp 98 °F (36.7 °C) (Skin)   Resp 16   Ht 167.6 cm (65.98\")   Wt 107 kg (235 lb)   SpO2 98%   BMI 37.95 kg/m²       Physical Exam  HENT:      Head: Normocephalic and atraumatic.   Eyes:      Conjunctiva/sclera: Conjunctivae normal.   Cardiovascular:      Rate and Rhythm: Normal rate and regular rhythm.      Heart sounds: Normal heart sounds.   Pulmonary:      Effort: Pulmonary effort is normal. No respiratory distress.      Breath sounds: Normal breath sounds. No wheezing, " rhonchi or rales.   Neurological:      General: No focal deficit present.      Mental Status: She is alert and oriented to person, place, and time.   Psychiatric:         Mood and Affect: Mood normal.         Behavior: Behavior normal.           Diagnoses and all orders for this visit:    1. Anxiety and depression (Primary)  - Patient doing well/controlled on current regimen.  Will continue current regimen  - refilled sertraline (ZOLOFT) 50 MG tablet; Take 1 tablet by mouth Daily.  Dispense: 90 tablet; Refill: 0    2. Class 2 obesity due to excess calories without serious comorbidity with body mass index (BMI) of 37.0 to 37.9 in adult  Class 2 Severe Obesity (BMI >=35 and <=39.9). Obesity-related health conditions include the following: hypertension. Obesity is unchanged. BMI is is above average; BMI management plan is completed. We discussed portion control and increasing exercise.         Follow Up  - 6/3/2025 for annual physical and anxiety check    Expected course, medications, and adverse effects discussed as appropriate.  Call or return if worsening or persistent symptoms. Hand hygiene was performed before donning protective equipment and after removal when leaving the room.    This document is intended for medical expert use only. Reading of this document by patients and/or patient's family without participating medical staff guidance may result in misinterpretation and unintended morbidity. Any interpretation of such data is the responsibility of the patient and/or family member responsible for the patient in concert with their primary or specialist providers, not to be left for sources of online searches such as [employer], [employer] or similar queries. Relying on these approaches to knowledge may result in misinterpretation, misguided goals of care and even death should patients or family members try recommendations outside of the realm of professional medical care.

## 2025-06-03 ENCOUNTER — OFFICE VISIT (OUTPATIENT)
Dept: FAMILY MEDICINE CLINIC | Facility: CLINIC | Age: 55
End: 2025-06-03
Payer: COMMERCIAL

## 2025-06-03 VITALS
HEART RATE: 76 BPM | DIASTOLIC BLOOD PRESSURE: 68 MMHG | HEIGHT: 66 IN | RESPIRATION RATE: 16 BRPM | TEMPERATURE: 98.6 F | SYSTOLIC BLOOD PRESSURE: 114 MMHG | WEIGHT: 231.4 LBS | OXYGEN SATURATION: 97 % | BODY MASS INDEX: 37.19 KG/M2

## 2025-06-03 DIAGNOSIS — R40.0 DAYTIME SLEEPINESS: ICD-10-CM

## 2025-06-03 DIAGNOSIS — Z00.00 ANNUAL PHYSICAL EXAM: Primary | ICD-10-CM

## 2025-06-03 DIAGNOSIS — E78.2 MIXED HYPERLIPIDEMIA: ICD-10-CM

## 2025-06-03 DIAGNOSIS — F41.9 ANXIETY AND DEPRESSION: ICD-10-CM

## 2025-06-03 DIAGNOSIS — E66.09 CLASS 2 OBESITY DUE TO EXCESS CALORIES WITHOUT SERIOUS COMORBIDITY WITH BODY MASS INDEX (BMI) OF 37.0 TO 37.9 IN ADULT: ICD-10-CM

## 2025-06-03 DIAGNOSIS — F32.A ANXIETY AND DEPRESSION: ICD-10-CM

## 2025-06-03 DIAGNOSIS — R73.03 PREDIABETES: ICD-10-CM

## 2025-06-03 DIAGNOSIS — E66.812 CLASS 2 OBESITY DUE TO EXCESS CALORIES WITHOUT SERIOUS COMORBIDITY WITH BODY MASS INDEX (BMI) OF 37.0 TO 37.9 IN ADULT: ICD-10-CM

## 2025-06-03 DIAGNOSIS — H92.03 EAR PAIN, BILATERAL: ICD-10-CM

## 2025-06-03 DIAGNOSIS — R06.83 SNORING: ICD-10-CM

## 2025-06-03 RX ORDER — METHYLPREDNISOLONE 4 MG/1
TABLET ORAL
Qty: 21 TABLET | Refills: 0 | Status: SHIPPED | OUTPATIENT
Start: 2025-06-03 | End: 2025-06-09

## 2025-06-03 NOTE — PROGRESS NOTES
Chief Complaint  Chief Complaint   Patient presents with    Annual Exam       Subjective    History of Present Illness        Giovanni Shin presents to [facility] for   Giovanni Shin is a 55 y.o. female here for her annual physical with me. Giovanni is here for coordination of medical care, to discuss health maintenance, disease prevention as well as to followup on medical problems.     Annual Physical Exam  Patient's last Physical Exam was 06/03/2024. Patient's medical history, medications and allergy lists were reviewed and updated.  Activity level is minimal.   Exercises 0 per week.   Appetite is good.   Feels fairly well with few complaints.   Energy level is poor.   Sleeps poorly to fairly well   Patient's last colonoscopy was 06/25/2021. She is advised to repeat in 10 years.   Patient's last mammogram was 06/13/2024.   Patient is doing routine self skin exam occasionally.   Patient is doing routine self-breast exams weekly  Patient's menstrual cycles are:  Post menopause  Last pap smear was done:  05/19/2020  Cytology and HPV negative.  Discussed with patient that she is currently due for a Pap smear.  Patient declines Pap smear, states she is not currently sexually active.  Discussed that cervical cancer can appear with past encounters and infections if she had been active.  Discussed risks of not checking for cervical cancer.  Patient verbalizes understanding but still declines      Anxiety and Depression  - Follow-up from 4/8/2025: Patient felt emotionless on 60 mg Cymbalta but was too emotional on 30 mg.  Patient noticed improvement increasing sertraline up to 50 mg and wanted to stay on that dose at that time.  Sent in refills  Today  - Pt states she is doing well with current Sertraline regimen      Birth control for menopause  - Patient currently has an IUD present, was placed by OB/GYN (Dr Lozano), who has left her practice  - Patient states she was told she should not  need a new IUD once she is at menopause and patient is requesting us to take out her IUD next year      Daytime sleepiness/snoring  - Patient states she snores, when she wakes up she does not feel rested  -Patient declines referral to sleep medicine or sleep study  - Patient states that her son is seeing a sleep doctor for [facility] and she wants to see how this goes before considering doing this for self      Bilateral ear pressure  -Reports last 2 weeks her ears have been hurting more  - Patient states that she takes loratadine and Flonase daily.  Had Singulair in the past but it would make her dizzy      BMI 37  -Patient interested in losing weight      Family History   Problem Relation Age of Onset    Hyperlipidemia Mother     Hypertension Mother     Hypertension Father     Factor V Leiden deficiency Father     Factor V Leiden deficiency Brother     Dementia Maternal Grandmother     Stroke Maternal Grandmother     Colon cancer Maternal Grandfather     Heart disease Maternal Grandfather     Stroke Paternal Grandmother     Stroke Paternal Grandfather     Down syndrome Son     Down syndrome Maternal Cousin        Social History     Tobacco Use    Smoking status: Former     Current packs/day: 0.00     Types: Cigarettes     Start date: 2006     Quit date: 2017     Years since quittin.4     Passive exposure: Past    Smokeless tobacco: Never    Tobacco comments:     Was smoking 1 cig per day   Vaping Use    Vaping status: Never Used   Substance Use Topics    Alcohol use: Not Currently    Drug use: Never       Past Surgical History:   Procedure Laterality Date    COLONOSCOPY N/A 2021    repeat in 10 years. Procedure: COLONOSCOPY with polypectomy;  Surgeon: Lance Mccarty MD;  Location: [facility];  Service: Gastroenterology;  Laterality: N/A;  post op: polyps    INTRAUTERINE DEVICE INSERTION  2021    placed after 6 years for heavy periods    WISDOM TOOTH EXTRACTION  1989       Patient  Active Problem List   Diagnosis    Age-related cataract of both eyes    Allergic rhinitis due to pollen    Anxiety and depression    Arthralgia of multiple joints    Gastroesophageal reflux disease without esophagitis    Heterozygous factor V Leiden mutation    History of DVT (deep vein thrombosis)    History of pulmonary embolism    Hypertension, benign    Lumbar disc disease with radiculopathy    Class 2 severe obesity due to excess calories with serious comorbidity and body mass index (BMI) of 36.0 to 36.9 in adult    Primary osteoarthritis of left knee    History of tobacco use    Fibromyalgia    Primary osteoarthritis of left hip    Chronic bilateral low back pain with bilateral sciatica    Asymptomatic menopausal state    Presence of 52 mg levonorgestrel-releasing intrauterine device (IUD)    Menorrhagia with regular cycle    Hyperplastic polyp of descending colon    Prediabetes    DDD (degenerative disc disease), cervical    Mixed hyperlipidemia         Current Outpatient Medications:     acetaminophen (TYLENOL) 650 MG 8 hr tablet, Take 2 tablets by mouth Every 8 (Eight) Hours As Needed for Mild Pain., Disp: , Rfl:     atenolol (TENORMIN) 50 MG tablet, Take 1 tablet by mouth once daily, Disp: 90 tablet, Rfl: 3    fluticasone (FLONASE) 50 MCG/ACT nasal spray, 2 sprays into the nostril(s) as directed by provider Daily., Disp: 18 mL, Rfl: 12    loratadine (CLARITIN) 10 MG tablet, Take 1 tablet by mouth Daily., Disp: , Rfl:     losartan (COZAAR) 50 MG tablet, Take 1 tablet by mouth once daily, Disp: 90 tablet, Rfl: 3    Mirena, 52 MG, 20 MCG/24HR IUD, , Disp: , Rfl:     pseudoephedrine (SUDAFED) 30 MG tablet, Take 1 tablet by mouth Every 4 (Four) Hours., Disp: , Rfl:     RABEprazole (ACIPHEX) 20 MG EC tablet, Take 1 tablet by mouth Daily., Disp: 90 tablet, Rfl: 3    sertraline (ZOLOFT) 50 MG tablet, Take 1 tablet by mouth Daily., Disp: 90 tablet, Rfl: 1    Xarelto 20 MG tablet, Take 1 tablet by mouth Daily.,  "Disp: , Rfl:     methylPREDNISolone (MEDROL) 4 MG dose pack, Take as directed on package instructions., Disp: 21 tablet, Rfl: 0    Objective   /68 (BP Location: Right arm, Patient Position: Sitting, Cuff Size: Adult)   Pulse 76   Temp 98.6 °F (37 °C) (Temporal)   Resp 16   Ht 167.6 cm (65.98\")   Wt 105 kg (231 lb 6.4 oz)   SpO2 97%   BMI 37.37 kg/m²   BP Readings from Last 3 Encounters:   06/03/25 114/68   04/08/25 108/60   03/03/25 110/68     Wt Readings from Last 3 Encounters:   06/03/25 105 kg (231 lb 6.4 oz)   04/08/25 107 kg (235 lb)   03/03/25 105 kg (232 lb)     Physical Exam  Constitutional:       General: She is not in acute distress.  HENT:      Head: Normocephalic and atraumatic.      Right Ear: Tympanic membrane normal.      Left Ear: Tympanic membrane normal.      Ears:      Comments: - Some serous fluid behind bilateral tympanic membranes but no erythema, bulging or purulence noted     Nose: Nose normal.      Mouth/Throat:      Mouth: Mucous membranes are moist.      Pharynx: Oropharynx is clear. No posterior oropharyngeal erythema.   Eyes:      Extraocular Movements: Extraocular movements intact.      Conjunctiva/sclera: Conjunctivae normal.   Neck:      Comments: - Thyroid not enlarged  Cardiovascular:      Rate and Rhythm: Normal rate and regular rhythm.      Heart sounds: Normal heart sounds.   Pulmonary:      Effort: Pulmonary effort is normal.      Breath sounds: Normal breath sounds. No stridor. No wheezing.   Abdominal:      General: Abdomen is flat. Bowel sounds are normal.      Palpations: Abdomen is soft. There is no mass.      Tenderness: There is no abdominal tenderness. There is no guarding.   Musculoskeletal:         General: No swelling or deformity. Normal range of motion.      Cervical back: Normal range of motion and neck supple.   Skin:     General: Skin is warm and dry.      Capillary Refill: Capillary refill takes less than 2 seconds.      Coloration: Skin is not " jaundiced.      Findings: No rash.   Neurological:      General: No focal deficit present.      Mental Status: She is alert and oriented to person, place, and time.      Cranial Nerves: No cranial nerve deficit.      Motor: No weakness.      Coordination: Coordination normal.      Gait: Gait normal.      Deep Tendon Reflexes: Reflexes normal.   Psychiatric:         Mood and Affect: Mood normal.         Behavior: Behavior normal.         Thought Content: Thought content normal.             Assessment and Plan   Diagnoses and all orders for this visit:    1. Annual physical exam (Primary)  -     CBC & Differential  -     Comprehensive metabolic panel  -     Lipid panel  -     Hemoglobin A1c  - Overall normal 55-year-old female exam  - Pertinent screening labs ordered  - Cervical cancer screening: Patient declined  - Breast cancer screening: Up-to-date  - Colon cancer screening: Up-to-date    2. Anxiety and depression  - Patient doing well/controlled on current regimen.  Will continue current regimen   - refilled  sertraline (ZOLOFT) 50 MG tablet; Take 1 tablet by mouth Daily.  Dispense: 90 tablet; Refill: 1    3. Snoring/ Daytime sleepiness  - Patient declined sleep study and sleep medicine referral at this time.  Will place if patient calls back requesting    5. Ear pain, bilateral  -     methylPREDNISolone (MEDROL) 4 MG dose pack; Take as directed on package instructions.  Dispense: 21 tablet; Refill: 0    6. Prediabetes  -     Hemoglobin A1c    7. Mixed hyperlipidemia  -     Lipid panel    8. Class 2 obesity due to excess calories without serious comorbidity with body mass index (BMI) of 37.0 to 37.9 in adult  Class 2 Severe Obesity (BMI >=35 and <=39.9). Obesity-related health conditions include the following: dyslipidemias. Obesity is unchanged. BMI is is above average; BMI management plan is completed. We discussed portion control and increasing exercise.   - Asked patient to track her calories via journal for  review on next appointment and we can discuss further interventions      Follow Up   - 1 year for annual physical exam  - 6 months for anxiety check  - 7/7/2025 for weight check      The patient was counseled regarding nutrition, physical activity, healthy weight, injury prevention, immunizations and preventative health screenings. Expected course, medications, and adverse effects discussed.  Call or return if worsening or persistent symptoms.  I wore protective equipment throughout this patient encounter including a mask and eye protection.   The complete contents of the Assessment and Plan and Data / Lab Results as documented above have been reviewed and addressed by myself with the patient today as part of an ongoing evaluation / treatment plan.

## 2025-06-09 ENCOUNTER — OFFICE VISIT (OUTPATIENT)
Dept: ORTHOPEDIC SURGERY | Facility: CLINIC | Age: 55
End: 2025-06-09
Payer: COMMERCIAL

## 2025-06-09 VITALS — OXYGEN SATURATION: 98 % | HEIGHT: 66 IN | WEIGHT: 231 LBS | BODY MASS INDEX: 37.12 KG/M2 | HEART RATE: 70 BPM

## 2025-06-09 DIAGNOSIS — M25.552 BILATERAL HIP PAIN: Primary | ICD-10-CM

## 2025-06-09 DIAGNOSIS — M70.62 GREATER TROCHANTERIC BURSITIS OF LEFT HIP: ICD-10-CM

## 2025-06-09 DIAGNOSIS — M25.551 BILATERAL HIP PAIN: Primary | ICD-10-CM

## 2025-06-09 DIAGNOSIS — M70.61 GREATER TROCHANTERIC BURSITIS, RIGHT: ICD-10-CM

## 2025-06-09 RX ORDER — TRIAMCINOLONE ACETONIDE 40 MG/ML
80 INJECTION, SUSPENSION INTRA-ARTICULAR; INTRAMUSCULAR
Status: COMPLETED | OUTPATIENT
Start: 2025-06-09 | End: 2025-06-09

## 2025-06-09 RX ADMIN — TRIAMCINOLONE ACETONIDE 80 MG: 40 INJECTION, SUSPENSION INTRA-ARTICULAR; INTRAMUSCULAR at 13:44

## 2025-06-09 NOTE — PROGRESS NOTES
Primary Care Sports Medicine Office Visit Note    Patient ID: Giovanni Shin is a 55 y.o. female.    Chief Complaint:  Chief Complaint   Patient presents with   • Right Hip - Pain, Initial Evaluation   • Left Hip - Pain, Initial Evaluation   • Lumbar Spine - Pain, Initial Evaluation       History of Present Illness  The patient presents for evaluation of back and hip pain.    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 The onset of these symptoms was approximately 8 years ago, coinciding with her diagnosis of fibromyalgia following a pulmonary embolism. She has been managing her pain with over-the-counter Tylenol, but it has not been consistently effective. She reports no weakness in either leg or any numbness or tingling sensations. Her pain is exacerbated by prolonged walking. She also experiences pain when lying on her side, which disrupts her sleep. She is unable to take Aleve due to her current use of Xarelto. She recently completed a course of methylprednisolone for an ear condition, which provided some relief from her hip pain. Prior to the embolism, she had been receiving epidural injections for pain management.       Past Medical History:   Diagnosis Date   • Acute bronchitis    • Acute non-recurrent maxillary sinusitis     Impression: She was prescribed Cipro to treat her symptoms. Increase fluids. Tylenol/motrin for pain or fever. Medication and medication adverse effects discussed. Follow-up 5-7 days for reevaluation if not improved or sooner if needed.   • Acute pharyngitis    • Acute serous otitis media    • Age-related cataract of both eyes    • Allergic    • Annual visit for general adult medical examination with abnormal findings    • Anxiety and depression    • Arthralgia of multiple joints     Impression: She has been seen by [facility], She reports the diagnosis of Fibromyalga. She is to make a  followup Her workup with lab and xray were negative She is off narotics   • Arthritis of back    • Atypical chest pain     Impression: REsolved.n Etiology uncetain Negative exam Negative CT with PE protochol ER 02/25/2018 Negative EKG and enzymes. Moderate life stresses She declines meds as this time. Diet low carbl low salt, regular exercise and follow She will notify us of sxs continue. GXT should that occur.   • Breast mass, left     Impression: 6mth follow up   • Candidiasis of mouth     Impression: aggravated by nasal steroids which she will hold   • Chronic pain syndrome     Impression: possible fibromyalgia, though Rheumatology has not concerned.   • Colon polyp     4 years ago   • Contact dermatitis or eczema     Impression: Left neck   • Cough     Impression: post influenza   • CTS (carpal tunnel syndrome) 11/2003    During my last pregnancy   • Deep vein thrombosis 6/5/17   • Depression     Impression: Moderate family and life stresses She has a good support network.   • Encounter for screening mammogram for malignant neoplasm of breast     Impression: Scheduled for 11/09/2017   • Fibromyalgia, primary    • Gastroesophageal reflux disease without esophagitis     Impression: REsume meds Possible etiology of sxs GI referral if sxs continue.   • Headache     Impression: negative exam etiology uncertain, meds as above.   • Hemangioma     Impression: right cheek at the nasal fold   • Heterozygous factor V Leiden mutation     Impression: Father and brother also positive. She is followed by [facility], she has hx of PE, she remain on Lovenox   • History of DVT (deep vein thrombosis)     Impression: LLE   • History of pulmonary embolism     Impression: 06/2017, remains concerned but this is unlikely to be contributing to her present sxs HEr CT at ER was negative. Now x 5 mos of anticoagulation, managed by Hematology, [place]   • History of tobacco use    • Hypertension, benign     Impression: Controlled   • Injury of back  1998    Fell on ice   • Low back pain    • Low back strain    • Lumbar disc disease with radiculopathy     Impression: Stable in pain medicine follow up She is encouraged to use pain meds as sparingly as possible.   • Lumbar disc disorder with myelopathy    • Lumbosacral disc disease    • Malaise and fatigue    • Menopause syndrome    • Multiple nevi    • Obesity    • Overweight (BMI 25.0-29.9)    • Positive depression screening    • Primary osteoarthritis of left knee    • Pruritic disorder    • Pruritus    • Pulmonary embolism    • Reactive airways dysfunction syndrome    • Routine general medical examination at a health care facility    • Scoliosis    • Screening for cervical cancer     Impression: Pap smear 09/07/2016. WNL HPV negative   • Screening for depression    • Screening for hyperlipidemia    • Seasonal allergic rhinitis due to pollen     Impression: Giovanni requested refill on doxycycline continue to have sore throat head congestion sinus pressure non productive cough   • Spinal stenosis    • Stomatitis, viral    • Tobacco use disorder     Story: Stable, Non-Compliant with cessation because she not interested in stopping at present Goals developed ie she will attempt to decrease and not increase consumption. Assess, address, and avoid triggers. Follow up in 6 month for reevaluation of use. Care management needs are self addressed. Self-Management abilities addressed and patient is capable of managing his/her own disease   • Upper respiratory infection     Impression: Increase fluids. Tylenol/motrin for pain or fever. Medication and medication adverse effects discussed. Follow-up 5-7 days for reevaluation if not improved or sooner if needed.   • Visual impairment        Past Surgical History:   Procedure Laterality Date   • COLONOSCOPY N/A 06/25/2021    repeat in 10 years. Procedure: COLONOSCOPY with polypectomy;  Surgeon: Lance Mccarty MD;  Location: [facility] ENDOSCOPY;  Service:  "Gastroenterology;  Laterality: N/A;  post op: polyps   • EPIDURAL BLOCK     • INTRAUTERINE DEVICE INSERTION  2021    placed after 6 years for heavy periods   • WISDOM TOOTH EXTRACTION         Family History   Problem Relation Age of Onset   • Hyperlipidemia Mother    • Hypertension Mother    • Osteoporosis Mother    • Anxiety disorder Mother    • Arthritis Mother    • Hypertension Father    • Factor V Leiden deficiency Father    • Clotting disorder Father    • Factor V Leiden deficiency Brother    • Clotting disorder Brother         Both brothers, my dad , and me have Factor 5 Leiden   • Dementia Maternal Grandmother    • Stroke Maternal Grandmother    • Colon cancer Maternal Grandfather    • Heart disease Maternal Grandfather    • Stroke Paternal Grandmother    • Stroke Paternal Grandfather    • Down syndrome Son    • Down syndrome Maternal Cousin    • Developmental Disability Son         Down Syndrome   • Developmental delay Son    • Hypertension Son      Social History     Occupational History   • Not on file   Tobacco Use   • Smoking status: Former     Current packs/day: 0.00     Types: Cigarettes     Start date: 2006     Quit date: 2017     Years since quittin.4     Passive exposure: Past   • Smokeless tobacco: Never   • Tobacco comments:     Smoke was a stress relief.   Vaping Use   • Vaping status: Never Used   Substance and Sexual Activity   • Alcohol use: Not Currently   • Drug use: Never   • Sexual activity: Not Currently     Partners: Male     Birth control/protection: I.U.D.        Review of Systems:  Review of Systems   Constitutional:  Negative for activity change, fatigue and fever.   Musculoskeletal:  Positive for arthralgias.   Skin:  Negative for color change and rash.   Neurological:  Negative for numbness.     Objective:  Physical Exam  Pulse 70   Ht 167.6 cm (65.98\")   Wt 105 kg (231 lb)   SpO2 98%   BMI 37.31 kg/m²   Vitals and nursing note reviewed.   Constitutional:  "      General: she  is not in acute distress.     Appearance: she is well-developed. she is not diaphoretic.   HENT:      Head: Normocephalic and atraumatic.   Eyes:      Conjunctiva/sclera: Conjunctivae normal.   Pulmonary:      Effort: Pulmonary effort is normal. No respiratory distress.   Skin:     General: Skin is warm.      Capillary Refill: Capillary refill takes less than 2 seconds.   Neurological:      Mental Status: she is alert.     Ortho Exam:  Physical Exam  Musculoskeletal: Lumbar spine shows normal range of motion with flexion, extension, rotation, side bending. No tenderness to palpation of the bone midline or paraspinal musculature. There is bilateral sacroiliac joint tenderness to palpation and considerable point tenderness to bilateral greater trochanteric bursa. Hip range of motion is full to both hips to flexion and internal and external rotation, abduction, and adduction. Positive FADIR testing bilaterally for tightness posteriorly. Negative scour, negative Stinchfield, negative log roll bilateral.    Results  Imaging   - X-ray of the right hip: Signs of wear and tear arthritis.   - X-ray of the left hip: Signs of wear and tear arthritis, less pronounced than the right hip.    Diagnoses and all orders for this visit:    1. Bilateral hip pain (Primary)  -     XR Hips Bilateral With or Without Pelvis 3-4 View      Assessment & Plan  1. Bilateral hip bursitis.  - Symptoms suggest bilateral hip bursitis, likely due to gluteal tendinitis.  - Lumbar spine evaluation reveals bilateral sacroiliac joint tenderness and considerable point tenderness to bilateral greater trochanteric bursa.  - Discussed the interconnected nature of back and hip pain, and the role of gluteus medius muscle weakness in causing bursitis.  - Administering steroid injections into the bursa of both hips today to alleviate inflammation. Physical therapy will be initiated to strengthen the gluteal muscles and prevent recurrence. An  order for physical therapy will be sent to [facility], and they will contact her to set up the sessions.    2. Gluteal tendinitis.  - The tendinitis is causing weakness in the gluteus medius muscle, leading to irritation of the bursa.  - Hip range of motion is full to both hips to flexion and internal and external rotation, abduction, and adduction. Positive FADIR testing bilaterally for tightness posteriorly.  - Discussed the importance of strengthening the gluteal muscles to address tendinitis and prevent bursitis recurrence.  - Physical therapy will be initiated to strengthen the gluteal muscles and address the tendinitis.    3. Back pain.  - History of back pain, which may be contributing to current symptoms.  - Lumbar spine evaluation reveals normal range of motion with flexion, extension, rotation, side bending; no tenderness to palpation of the bone midline or paraspinal musculature.  - Discussed the need to treat both back and hip pain for comprehensive relief.  - Physical therapy will also address back pain as part of the overall treatment plan.    PROCEDURE  Procedure: Bilateral Hip Bursa Steroid Injections    All questions were answered and agreement to proceed was given after the following Pre-Procedure details were reviewed:  - Risks and Benefits: Potential for temporary pain relief, reduction of inflammation, possible side effects including infection, bleeding, and allergic reaction.  - Alternative Options: Physical therapy, oral anti-inflammatory medications, lifestyle modifications.  - Side effects: Pain at injection site, infection, bleeding, allergic reaction.  - Consent: Verbal consent obtained.    Intra-Procedure:  - Time-Out: Confirmed patient identity, procedure, and site.  - Site Preparation: Skin cleaned with antiseptic solution.  - Anesthesia: Local anesthesia administered.  - Medication: Steroid injected into the bursa of both hips.  - Dressing: Bandage applied to injection  "sites.    Post-Procedure:  - Tolerance Level: Patient tolerated the procedure well.  - Home Care Instructions: Advised to monitor for signs of infection, avoid strenuous activity for 24 hours, and follow up with physical therapy.    - Large Joint Arthrocentesis: bilateral greater trochanteric bursa on 6/9/2025 1:44 PM  Indications: pain  Details: 25 G needle, lateral approach  Medications (Right): 80 mg triamcinolone acetonide 40 MG/ML  Medications (Left): 80 mg triamcinolone acetonide 40 MG/ML  Outcome: tolerated well, no immediate complications  Procedure, treatment alternatives, risks and benefits explained, specific risks discussed. Consent was given by the patient. Immediately prior to procedure a time out was called to verify the correct patient, procedure, equipment, support staff and site/side marked as required. Patient was prepped and draped in the usual sterile fashion.       Wilber MUÑIZ \"Chance\" Kyle SANCHES DO, CAQSM  06/09/25  13:44 EDT    Disclaimer: Please note that areas of this note were completed with computer voice recognition software.  Quite often unanticipated grammatical, syntax, homophones, and other interpretive errors are inadvertently transcribed by the computer software. Please excuse any errors that have escaped final proofreading.    Patient or patient representative verbalized consent for the use of Ambient Listening during the visit with  Wilber Wright II, DO for chart documentation. 13:44 EDT 06/09/25   "

## 2025-06-10 ENCOUNTER — PATIENT ROUNDING (BHMG ONLY) (OUTPATIENT)
Dept: ORTHOPEDIC SURGERY | Facility: CLINIC | Age: 55
End: 2025-06-10
Payer: COMMERCIAL

## (undated) DEVICE — PK ENDO GI 50

## (undated) DEVICE — SNAR POLYP HOTSNARE/BRAIDED OVL/MINI 7F 2.8X10MM 230CM 1P/U

## (undated) DEVICE — PAPR PRNT PK SONY W RIBN UPC55

## (undated) DEVICE — TRAP WIDEEYE POLYP